# Patient Record
Sex: MALE | Race: WHITE | URBAN - METROPOLITAN AREA
[De-identification: names, ages, dates, MRNs, and addresses within clinical notes are randomized per-mention and may not be internally consistent; named-entity substitution may affect disease eponyms.]

---

## 2022-11-25 ENCOUNTER — HOSPITAL ENCOUNTER (OUTPATIENT)
Dept: LAB | Age: 62
Discharge: HOME | End: 2022-11-25

## 2022-11-25 LAB
ANION GAP SERPL CALC-SCNC: 3 MMOL/L (ref 5–15)
BUN SERPL-MCNC: 39 MG/DL (ref 6–20)
BUN/CREAT SERPL: 22 (ref 12–20)
CA-I BLD-MCNC: 8.8 MG/DL (ref 8.5–10.1)
CHLORIDE SERPL-SCNC: 114 MMOL/L (ref 97–108)
CO2 SERPL-SCNC: 27 MMOL/L (ref 21–32)
CREAT SERPL-MCNC: 1.76 MG/DL (ref 0.7–1.3)
GLUCOSE SERPL-MCNC: 109 MG/DL (ref 65–100)
POTASSIUM SERPL-SCNC: 4.3 MMOL/L (ref 3.5–5.1)
SODIUM SERPL-SCNC: 144 MMOL/L (ref 136–145)

## 2022-11-25 PROCEDURE — 80048 BASIC METABOLIC PNL TOTAL CA: CPT

## 2022-11-25 PROCEDURE — 36415 COLL VENOUS BLD VENIPUNCTURE: CPT

## 2022-12-22 ENCOUNTER — HOSPITAL ENCOUNTER (EMERGENCY)
Age: 62
Discharge: HOME | End: 2022-12-22
Attending: EMERGENCY MEDICINE
Payer: MEDICAID

## 2022-12-22 VITALS
WEIGHT: 135 LBS | RESPIRATION RATE: 16 BRPM | TEMPERATURE: 97.7 F | SYSTOLIC BLOOD PRESSURE: 159 MMHG | BODY MASS INDEX: 23.92 KG/M2 | OXYGEN SATURATION: 98 % | HEIGHT: 63 IN | DIASTOLIC BLOOD PRESSURE: 88 MMHG | HEART RATE: 68 BPM

## 2022-12-22 DIAGNOSIS — I10 ESSENTIAL HYPERTENSION: ICD-10-CM

## 2022-12-22 DIAGNOSIS — Z76.0 MEDICATION REFILL: Primary | ICD-10-CM

## 2022-12-22 PROCEDURE — 99283 EMERGENCY DEPT VISIT LOW MDM: CPT

## 2022-12-22 RX ORDER — ASPIRIN 81 MG/1
81 TABLET ORAL DAILY
Qty: 30 TABLET | Refills: 1 | Status: SHIPPED | OUTPATIENT
Start: 2022-12-22

## 2022-12-22 RX ORDER — CLOPIDOGREL BISULFATE 75 MG/1
TABLET ORAL
COMMUNITY
Start: 2022-11-25 | End: 2022-12-22 | Stop reason: SDUPTHER

## 2022-12-22 RX ORDER — AMLODIPINE BESYLATE 10 MG/1
10 TABLET ORAL DAILY
Qty: 30 TABLET | Refills: 1 | Status: SHIPPED | OUTPATIENT
Start: 2022-12-22

## 2022-12-22 RX ORDER — METOPROLOL TARTRATE 50 MG/1
50 TABLET ORAL 2 TIMES DAILY
Qty: 60 TABLET | Refills: 1 | Status: SHIPPED | OUTPATIENT
Start: 2022-12-22

## 2022-12-22 RX ORDER — ATORVASTATIN CALCIUM 40 MG/1
40 TABLET, FILM COATED ORAL
COMMUNITY
Start: 2022-05-13 | End: 2022-12-22

## 2022-12-22 RX ORDER — ATORVASTATIN CALCIUM 40 MG/1
40 TABLET, FILM COATED ORAL DAILY
Qty: 30 TABLET | Refills: 1 | Status: SHIPPED | OUTPATIENT
Start: 2022-12-22 | End: 2022-12-22

## 2022-12-22 RX ORDER — ATORVASTATIN CALCIUM 40 MG/1
40 TABLET, FILM COATED ORAL DAILY
Qty: 30 TABLET | Refills: 1 | Status: SHIPPED | OUTPATIENT
Start: 2022-12-22

## 2022-12-22 RX ORDER — ASPIRIN 81 MG/1
TABLET ORAL
COMMUNITY
Start: 2022-11-25 | End: 2022-12-22 | Stop reason: SDUPTHER

## 2022-12-22 RX ORDER — CLOPIDOGREL BISULFATE 75 MG/1
75 TABLET ORAL DAILY
Qty: 30 TABLET | Refills: 1 | Status: SHIPPED | OUTPATIENT
Start: 2022-12-22

## 2022-12-22 RX ORDER — METOPROLOL TARTRATE 50 MG/1
50 TABLET ORAL 2 TIMES DAILY
COMMUNITY
Start: 2022-11-25 | End: 2022-12-22 | Stop reason: SDUPTHER

## 2022-12-22 RX ORDER — INSULIN GLARGINE 100 [IU]/ML
10 INJECTION, SOLUTION SUBCUTANEOUS
COMMUNITY
Start: 2022-02-28

## 2022-12-22 RX ORDER — AMLODIPINE BESYLATE 10 MG/1
TABLET ORAL
COMMUNITY
Start: 2022-11-25 | End: 2022-12-22 | Stop reason: SDUPTHER

## 2022-12-22 NOTE — ED PROVIDER NOTES
EMERGENCY DEPARTMENT HISTORY AND PHYSICAL EXAM      Date: 12/22/2022  Patient Name: Carmela Patiño    History of Presenting Illness     Chief Complaint   Patient presents with    Medication Refill       History Provided By: Patient and Caregiver    HPI: Carmela Patiño, 57 y.o. male presents to the emergency department complaint of medication refill. Per caregiver, he just ran out of his medications other than insulin. Caregiver states he has a follow-up with a new primary care physician but not until next month. Patient without any complaints and caregiver states patient is at baseline. There are no other complaints, changes, or physical findings at this time. Past History     Past Medical History:  Past Medical History:   Diagnosis Date    CVS disease     Diabetes ([address]     High cholesterol     HTN (hypertension)        Past Surgical History:  History reviewed. No pertinent surgical history. Family History:  History reviewed. No pertinent family history. Social History:  Social History     Tobacco Use    Smoking status: Never    Smokeless tobacco: Never   Substance Use Topics    Alcohol use: Never    Drug use: Never       Allergies:  No Known Allergies    PCP: None    No current facility-administered medications on file prior to encounter. Current Outpatient Medications on File Prior to Encounter   Medication Sig Dispense Refill    insulin glargine (LANTUS,BASAGLAR) 100 unit/mL (3 mL) inpn 10 Units by SubCUTAneous route. [DISCONTINUED] amLODIPine (NORVASC) 10 mg tablet       [DISCONTINUED] aspirin delayed-release 81 mg tablet       [DISCONTINUED] clopidogreL (PLAVIX) 75 mg tab       [DISCONTINUED] metoprolol tartrate (LOPRESSOR) 50 mg tablet Take 50 mg by mouth two (2) times a day. [DISCONTINUED] atorvastatin (LIPITOR) 40 mg tablet Take 40 mg by mouth nightly. Review of Systems   Review of Systems  Review of Systems   Constitutional: Negative for chills and fever. HENT: Negative for sinus pressure and sinus pain. Eyes: Negative for photophobia and redness. Respiratory: Negative for shortness of breath and wheezing. Cardiovascular: Negative for chest pain and palpitations. Gastrointestinal: Negative for abdominal pain and nausea. Genitourinary: Negative for flank pain and hematuria. Musculoskeletal: Negative for arthralgias and gait problem. Skin: Negative for color change and pallor. Neurological: Negative for dizziness and weakness. Physical Exam   Physical Exam  Physical Exam  Constitutional:       General: No acute distress. Appearance: Normal appearance. Not toxic-appearing. HENT:      Head: Normocephalic and atraumatic. Nose: Nose normal.      Mouth/Throat:      Mouth: Mucous membranes are moist.   Eyes:      Extraocular Movements: Extraocular movements intact. Pupils: Pupils are equal, round, and reactive to light. Cardiovascular:      Rate and Rhythm: Normal rate. Pulses: Normal pulses. Pulmonary:      Effort: Pulmonary effort is normal.      Breath sounds: No stridor. Abdominal:      General: Abdomen is flat. There is no distension. Musculoskeletal:         General: Normal range of motion. Cervical back: Normal range of motion and neck supple. Skin:     General: Skin is warm and dry. Capillary Refill: Capillary refill takes less than 2 seconds. Neurological:      General: No focal deficit present. Mental Status: Aert and oriented to person, place, and time. Psychiatric:         Mood and Affect: Mood normal.         Behavior: Behavior normal.     Lab and Diagnostic Study Results   Labs -   No results found for this or any previous visit (from the past 12 hour(s)).     Radiologic Studies -   @lastxrresult@  CT Results  (Last 48 hours)      None          CXR Results  (Last 48 hours)      None            Medical Decision Making and ED Course   Differential Diagnosis & Medical Decision Making Provider Note:       - I am the first provider for this patient. I reviewed the vital signs, available nursing notes, past medical history, past surgical history, family history and social history. The patients presenting problems have been discussed, and they are in agreement with the care plan formulated and outlined with them. I have encouraged them to ask questions as they arise throughout their visit. Vital Signs-Reviewed the patient's vital signs. Patient Vitals for the past 12 hrs:   Temp Pulse Resp BP SpO2   12/22/22 0947 97.7 °F (36.5 °C) 68 16 (!) 159/88 98 %           Disposition   Disposition: DC- Adult Discharges: All of the diagnostic tests were reviewed and questions answered. Diagnosis, care plan and treatment options were discussed. The patient understands the instructions and will follow up as directed. The patients results have been reviewed with them. They have been counseled regarding their diagnosis. The patient verbally convey understanding and agreement of the signs, symptoms, diagnosis, treatment and prognosis and additionally agrees to follow up as recommended with their PCP in 24 - 48 hours. They also agree with the care-plan and convey that all of their questions have been answered. I have also put together some discharge instructions for them that include: 1) educational information regarding their diagnosis, 2) how to care for their diagnosis at home, as well a 3) list of reasons why they would want to return to the ED prior to their follow-up appointment, should their condition change. DISCHARGE PLAN:  1. Current Discharge Medication List        CONTINUE these medications which have CHANGED    Details   !! atorvastatin (LIPITOR) 40 mg tablet Take 1 Tablet by mouth daily. Qty: 30 Tablet, Refills: 1      metoprolol tartrate (LOPRESSOR) 50 mg tablet Take 1 Tablet by mouth two (2) times a day.   Qty: 60 Tablet, Refills: 1      clopidogreL (PLAVIX) 75 mg tab Take 1 Tablet by mouth daily. Qty: 30 Tablet, Refills: 1      amLODIPine (NORVASC) 10 mg tablet Take 1 Tablet by mouth daily. Qty: 30 Tablet, Refills: 1      aspirin delayed-release 81 mg tablet Take 1 Tablet by mouth daily. Qty: 30 Tablet, Refills: 1      !! atorvastatin (LIPITOR) 40 mg tablet Take 1 Tablet by mouth daily. Qty: 30 Tablet, Refills: 1       !! - Potential duplicate medications found. Please discuss with provider. CONTINUE these medications which have NOT CHANGED    Details   insulin glargine (LANTUS,BASAGLAR) 100 unit/mL (3 mL) inpn 10 Units by SubCUTAneous route. 2.   Follow-up Information    None       3. Return to ED if worse   4. Current Discharge Medication List        CONTINUE these medications which have CHANGED    Details   !! atorvastatin (LIPITOR) 40 mg tablet Take 1 Tablet by mouth daily. Qty: 30 Tablet, Refills: 1  Start date: 12/22/2022      metoprolol tartrate (LOPRESSOR) 50 mg tablet Take 1 Tablet by mouth two (2) times a day. Qty: 60 Tablet, Refills: 1  Start date: 12/22/2022      clopidogreL (PLAVIX) 75 mg tab Take 1 Tablet by mouth daily. Qty: 30 Tablet, Refills: 1  Start date: 12/22/2022      amLODIPine (NORVASC) 10 mg tablet Take 1 Tablet by mouth daily. Qty: 30 Tablet, Refills: 1  Start date: 12/22/2022      aspirin delayed-release 81 mg tablet Take 1 Tablet by mouth daily. Qty: 30 Tablet, Refills: 1  Start date: 12/22/2022      !! atorvastatin (LIPITOR) 40 mg tablet Take 1 Tablet by mouth daily. Qty: 30 Tablet, Refills: 1  Start date: 12/22/2022       !! - Potential duplicate medications found. Please discuss with provider. Diagnosis/Clinical Impression     Clinical Impression:   1. Medication refill    2. Essential hypertension        Attestations: IIsaiah MD, am the primary clinician of record. Please note that this dictation was completed with EPS, the [employer] voice recognition software.   Quite often unanticipated grammatical, syntax, homophones, and other interpretive errors are inadvertently transcribed by the computer software. Please disregard these errors. Please excuse any errors that have escaped final proofreading. Thank you.

## 2022-12-22 NOTE — ED TRIAGE NOTES
Ran out of medications 2 days ago, cant see Dr. Maddie Moore Jan 20, 2023  needs refil. Insulin has 20 doses left per caregiver, ran out of PO meds 2 days ago.

## 2023-08-21 ENCOUNTER — NURSE TRIAGE (OUTPATIENT)
Dept: OTHER | Facility: CLINIC | Age: 63
End: 2023-08-21

## 2023-08-21 NOTE — TELEPHONE ENCOUNTER
Location of patient: [address] [facility] [address] call from [facility] at [facility], [place]; Patient with [employer] Complaint requesting to establish care with [facility]. **limited triage as caller is not with patient**    Subjective: Caller states \"he sleeps a lot\"     Current Symptoms: increased fatigue. States he is sleeping more often. Caretaker reports she believes he takes a BP medications. Onset: a couple days ago; unchanged    Associated Symptoms: increased sleepiness    Pain Severity: 0/10; N/A; none    Temperature: no fevers     What has been tried: increased sleeping     LMP: NA Pregnant: No    Recommended disposition: See in Office Today    Care advice provided, patient verbalizes understanding; denies any other questions or concerns; instructed to call back for any new or worsening symptoms. Patient/Caller agrees with recommended disposition; writer provided warm transfer to Barbra Coppola at [facility], [place] for appointment scheduling    Attention Provider: Thank you for allowing me to participate in the care of your patient. The patient was connected to triage in response to information provided to the [facility]. Please do not respond through this encounter as the response is not directed to a shared pool.       Reason for Disposition   Taking a medicine that could cause weakness (e.g., blood pressure medications, diuretics)    Protocols used: Weakness (Generalized) and Fatigue-ADULT-OH

## 2023-10-03 ENCOUNTER — HOSPITAL ENCOUNTER (EMERGENCY)
Facility: HOSPITAL | Age: 63
Discharge: HOME | End: 2023-10-03
Payer: MEDICARE

## 2023-10-03 ENCOUNTER — OFFICE VISIT (OUTPATIENT)
Facility: CLINIC | Age: 63
End: 2023-10-03
Payer: MEDICARE

## 2023-10-03 VITALS
RESPIRATION RATE: 16 BRPM | OXYGEN SATURATION: 96 % | DIASTOLIC BLOOD PRESSURE: 70 MMHG | SYSTOLIC BLOOD PRESSURE: 125 MMHG | WEIGHT: 115 LBS | HEART RATE: 74 BPM | HEIGHT: 63 IN | BODY MASS INDEX: 20.38 KG/M2 | TEMPERATURE: 97.7 F

## 2023-10-03 VITALS
HEART RATE: 66 BPM | OXYGEN SATURATION: 97 % | HEIGHT: 63 IN | TEMPERATURE: 98 F | SYSTOLIC BLOOD PRESSURE: 149 MMHG | WEIGHT: 115 LBS | DIASTOLIC BLOOD PRESSURE: 85 MMHG | RESPIRATION RATE: 17 BRPM | BODY MASS INDEX: 20.38 KG/M2

## 2023-10-03 DIAGNOSIS — Z11.59 ENCOUNTER FOR HEPATITIS C SCREENING TEST FOR LOW RISK PATIENT: ICD-10-CM

## 2023-10-03 DIAGNOSIS — I50.32 CHRONIC DIASTOLIC CHF (CONGESTIVE HEART FAILURE) (HCC): ICD-10-CM

## 2023-10-03 DIAGNOSIS — I10 PRIMARY HYPERTENSION: ICD-10-CM

## 2023-10-03 DIAGNOSIS — E11.65 UNCONTROLLED TYPE 2 DIABETES MELLITUS WITH HYPERGLYCEMIA, WITH LONG-TERM CURRENT USE OF INSULIN (HCC): Primary | ICD-10-CM

## 2023-10-03 DIAGNOSIS — E78.2 MIXED HYPERLIPIDEMIA: ICD-10-CM

## 2023-10-03 DIAGNOSIS — Z11.4 ENCOUNTER FOR SCREENING FOR HIV: ICD-10-CM

## 2023-10-03 DIAGNOSIS — E55.9 VITAMIN D DEFICIENCY: ICD-10-CM

## 2023-10-03 DIAGNOSIS — R73.9 HYPERGLYCEMIA WITHOUT KETOSIS: Primary | ICD-10-CM

## 2023-10-03 DIAGNOSIS — Z86.73 HISTORY OF CVA (CEREBROVASCULAR ACCIDENT): ICD-10-CM

## 2023-10-03 DIAGNOSIS — Z79.4 UNCONTROLLED TYPE 2 DIABETES MELLITUS WITH HYPERGLYCEMIA, WITH LONG-TERM CURRENT USE OF INSULIN (HCC): Primary | ICD-10-CM

## 2023-10-03 PROBLEM — E78.5 HYPERLIPIDEMIA: Status: ACTIVE | Noted: 2018-02-01

## 2023-10-03 LAB
ALBUMIN SERPL-MCNC: 2.5 G/DL (ref 3.5–5)
ALBUMIN/GLOB SERPL: 0.6 (ref 1.1–2.2)
ALP SERPL-CCNC: 148 U/L (ref 45–117)
ALT SERPL-CCNC: 16 U/L (ref 12–78)
ANION GAP SERPL CALC-SCNC: 8 MMOL/L (ref 5–15)
APPEARANCE UR: CLEAR
AST SERPL W P-5'-P-CCNC: ABNORMAL U/L (ref 15–37)
BACTERIA URNS QL MICRO: NEGATIVE /HPF
BASOPHILS # BLD: 0 K/UL (ref 0–0.1)
BASOPHILS NFR BLD: 0 % (ref 0–1)
BILIRUB SERPL-MCNC: 0.4 MG/DL (ref 0.2–1)
BILIRUB UR QL: NEGATIVE
BUN SERPL-MCNC: 25 MG/DL (ref 6–20)
BUN/CREAT SERPL: 9 (ref 12–20)
CA-I BLD-MCNC: 8.6 MG/DL (ref 8.5–10.1)
CHLORIDE SERPL-SCNC: 99 MMOL/L (ref 97–108)
CO2 SERPL-SCNC: 26 MMOL/L (ref 21–32)
COLOR UR: ABNORMAL
CREAT SERPL-MCNC: 2.67 MG/DL (ref 0.7–1.3)
DIFFERENTIAL METHOD BLD: ABNORMAL
EOSINOPHIL # BLD: 0.4 K/UL (ref 0–0.4)
EOSINOPHIL NFR BLD: 5 % (ref 0–7)
EPITH CASTS URNS QL MICRO: ABNORMAL /LPF
ERYTHROCYTE [DISTWIDTH] IN BLOOD BY AUTOMATED COUNT: 13 % (ref 11.5–14.5)
GLOBULIN SER CALC-MCNC: 4 G/DL (ref 2–4)
GLUCOSE BLD STRIP.AUTO-MCNC: 436 MG/DL (ref 65–100)
GLUCOSE SERPL-MCNC: 551 MG/DL (ref 65–100)
GLUCOSE UR STRIP.AUTO-MCNC: >500 MG/DL
GLUCOSE, POC: 581 MG/DL
HBA1C MFR BLD: 15 %
HCT VFR BLD AUTO: 30.2 % (ref 36.6–50.3)
HGB BLD-MCNC: 10.6 G/DL (ref 12.1–17)
HGB UR QL STRIP: ABNORMAL
IMM GRANULOCYTES # BLD AUTO: 0 K/UL (ref 0–0.04)
IMM GRANULOCYTES NFR BLD AUTO: 0 % (ref 0–0.5)
KETONES UR QL STRIP.AUTO: NEGATIVE MG/DL
LEUKOCYTE ESTERASE UR QL STRIP.AUTO: NEGATIVE
LIPASE SERPL-CCNC: 75 U/L (ref 73–393)
LYMPHOCYTES # BLD: 0.8 K/UL (ref 0.8–3.5)
LYMPHOCYTES NFR BLD: 10 % (ref 12–49)
MCH RBC QN AUTO: 31.1 PG (ref 26–34)
MCHC RBC AUTO-ENTMCNC: 35.1 G/DL (ref 30–36.5)
MCV RBC AUTO: 88.6 FL (ref 80–99)
MONOCYTES # BLD: 0.7 K/UL (ref 0–1)
MONOCYTES NFR BLD: 9 % (ref 5–13)
MUCOUS THREADS URNS QL MICRO: ABNORMAL /LPF
NEUTS SEG # BLD: 5.7 K/UL (ref 1.8–8)
NEUTS SEG NFR BLD: 76 % (ref 32–75)
NITRITE UR QL STRIP.AUTO: NEGATIVE
NRBC # BLD: 0 K/UL (ref 0–0.01)
NRBC BLD-RTO: 0 PER 100 WBC
PERFORMED BY:: ABNORMAL
PH UR STRIP: 5 (ref 5–8)
PLATELET # BLD AUTO: 270 K/UL (ref 150–400)
PMV BLD AUTO: 10.2 FL (ref 8.9–12.9)
POTASSIUM SERPL-SCNC: ABNORMAL MMOL/L (ref 3.5–5.1)
PROT SERPL-MCNC: 6.5 G/DL (ref 6.4–8.2)
PROT UR STRIP-MCNC: 100 MG/DL
RBC # BLD AUTO: 3.41 M/UL (ref 4.1–5.7)
RBC #/AREA URNS HPF: ABNORMAL /HPF (ref 0–5)
SODIUM SERPL-SCNC: 133 MMOL/L (ref 136–145)
SP GR UR REFRACTOMETRY: 1.02 (ref 1–1.03)
URINE CULTURE IF INDICATED: ABNORMAL
UROBILINOGEN UR QL STRIP.AUTO: 0.1 EU/DL (ref 0.1–1)
WBC # BLD AUTO: 7.6 K/UL (ref 4.1–11.1)
WBC URNS QL MICRO: ABNORMAL /HPF (ref 0–4)

## 2023-10-03 PROCEDURE — 1123F ACP DISCUSS/DSCN MKR DOCD: CPT

## 2023-10-03 PROCEDURE — 82962 GLUCOSE BLOOD TEST: CPT

## 2023-10-03 PROCEDURE — 99204 OFFICE O/P NEW MOD 45 MIN: CPT

## 2023-10-03 PROCEDURE — 85025 COMPLETE CBC W/AUTO DIFF WBC: CPT

## 2023-10-03 PROCEDURE — 99284 EMERGENCY DEPT VISIT MOD MDM: CPT

## 2023-10-03 PROCEDURE — 3078F DIAST BP <80 MM HG: CPT

## 2023-10-03 PROCEDURE — 83036 HEMOGLOBIN GLYCOSYLATED A1C: CPT

## 2023-10-03 PROCEDURE — 96361 HYDRATE IV INFUSION ADD-ON: CPT

## 2023-10-03 PROCEDURE — 3074F SYST BP LT 130 MM HG: CPT

## 2023-10-03 PROCEDURE — 36415 COLL VENOUS BLD VENIPUNCTURE: CPT

## 2023-10-03 PROCEDURE — 96360 HYDRATION IV INFUSION INIT: CPT

## 2023-10-03 PROCEDURE — 81001 URINALYSIS AUTO W/SCOPE: CPT

## 2023-10-03 PROCEDURE — 80053 COMPREHEN METABOLIC PANEL: CPT

## 2023-10-03 PROCEDURE — 83690 ASSAY OF LIPASE: CPT

## 2023-10-03 PROCEDURE — 6370000000 HC RX 637 (ALT 250 FOR IP): Performed by: NURSE PRACTITIONER

## 2023-10-03 PROCEDURE — 2580000003 HC RX 258: Performed by: NURSE PRACTITIONER

## 2023-10-03 RX ORDER — CLOPIDOGREL BISULFATE 75 MG/1
75 TABLET ORAL DAILY
Qty: 90 TABLET | Refills: 0 | Status: SHIPPED | OUTPATIENT
Start: 2023-10-03

## 2023-10-03 RX ORDER — ERGOCALCIFEROL 1.25 MG/1
50000 CAPSULE ORAL WEEKLY
Qty: 12 CAPSULE | Refills: 0 | Status: SHIPPED | OUTPATIENT
Start: 2023-10-03

## 2023-10-03 RX ORDER — GLIPIZIDE 5 MG/1
1 TABLET ORAL 2 TIMES DAILY
COMMUNITY
Start: 2021-07-27 | End: 2023-10-03 | Stop reason: SDUPTHER

## 2023-10-03 RX ORDER — GLIPIZIDE 5 MG/1
5 TABLET ORAL 2 TIMES DAILY
Qty: 60 TABLET | Refills: 0 | Status: SHIPPED | OUTPATIENT
Start: 2023-10-03

## 2023-10-03 RX ORDER — NIFEDIPINE 60 MG/1
60 TABLET, EXTENDED RELEASE ORAL DAILY
COMMUNITY
Start: 2022-05-17 | End: 2023-10-03 | Stop reason: SDUPTHER

## 2023-10-03 RX ORDER — ATORVASTATIN CALCIUM 40 MG/1
40 TABLET, FILM COATED ORAL DAILY
Qty: 90 TABLET | Refills: 0 | Status: SHIPPED | OUTPATIENT
Start: 2023-10-03

## 2023-10-03 RX ORDER — AMLODIPINE BESYLATE 10 MG/1
10 TABLET ORAL DAILY
Qty: 90 TABLET | Refills: 0 | Status: SHIPPED | OUTPATIENT
Start: 2023-10-03

## 2023-10-03 RX ORDER — LOSARTAN POTASSIUM 25 MG/1
25 TABLET ORAL DAILY
Qty: 90 TABLET | Refills: 0 | Status: SHIPPED | OUTPATIENT
Start: 2023-10-03

## 2023-10-03 RX ORDER — INSULIN GLARGINE 100 [IU]/ML
15 INJECTION, SOLUTION SUBCUTANEOUS
Status: COMPLETED | OUTPATIENT
Start: 2023-10-03 | End: 2023-10-03

## 2023-10-03 RX ORDER — INSULIN GLARGINE 100 [IU]/ML
15 INJECTION, SOLUTION SUBCUTANEOUS NIGHTLY
Qty: 5 ADJUSTABLE DOSE PRE-FILLED PEN SYRINGE | Refills: 2 | Status: SHIPPED | OUTPATIENT
Start: 2023-10-03

## 2023-10-03 RX ORDER — METOPROLOL TARTRATE 50 MG/1
50 TABLET, FILM COATED ORAL 2 TIMES DAILY
Qty: 180 TABLET | Refills: 0 | Status: SHIPPED | OUTPATIENT
Start: 2023-10-03

## 2023-10-03 RX ORDER — ERGOCALCIFEROL 1.25 MG/1
CAPSULE ORAL
COMMUNITY
Start: 2023-08-21 | End: 2023-10-03 | Stop reason: SDUPTHER

## 2023-10-03 RX ORDER — 0.9 % SODIUM CHLORIDE 0.9 %
1000 INTRAVENOUS SOLUTION INTRAVENOUS
Status: COMPLETED | OUTPATIENT
Start: 2023-10-03 | End: 2023-10-03

## 2023-10-03 RX ORDER — ASPIRIN 81 MG/1
81 TABLET ORAL DAILY
Qty: 90 TABLET | Refills: 0 | Status: SHIPPED | OUTPATIENT
Start: 2023-10-03

## 2023-10-03 RX ORDER — NIFEDIPINE 60 MG/1
60 TABLET, EXTENDED RELEASE ORAL DAILY
Qty: 90 TABLET | Refills: 0 | Status: SHIPPED | OUTPATIENT
Start: 2023-10-03

## 2023-10-03 RX ORDER — GLIPIZIDE 5 MG/1
5 TABLET ORAL
Status: COMPLETED | OUTPATIENT
Start: 2023-10-03 | End: 2023-10-03

## 2023-10-03 RX ORDER — LOSARTAN POTASSIUM 25 MG/1
25 TABLET ORAL
COMMUNITY
Start: 2021-09-09 | End: 2023-10-03 | Stop reason: SDUPTHER

## 2023-10-03 RX ADMIN — INSULIN GLARGINE 15 UNITS: 100 INJECTION, SOLUTION SUBCUTANEOUS at 15:56

## 2023-10-03 RX ADMIN — SODIUM CHLORIDE 1000 ML: 9 INJECTION, SOLUTION INTRAVENOUS at 16:28

## 2023-10-03 RX ADMIN — GLIPIZIDE 5 MG: 5 TABLET ORAL at 18:00

## 2023-10-03 RX ADMIN — SODIUM CHLORIDE 1000 ML: 9 INJECTION, SOLUTION INTRAVENOUS at 14:23

## 2023-10-03 SDOH — ECONOMIC STABILITY: INCOME INSECURITY: HOW HARD IS IT FOR YOU TO PAY FOR THE VERY BASICS LIKE FOOD, HOUSING, MEDICAL CARE, AND HEATING?: NOT HARD AT ALL

## 2023-10-03 SDOH — ECONOMIC STABILITY: HOUSING INSECURITY
IN THE LAST 12 MONTHS, WAS THERE A TIME WHEN YOU DID NOT HAVE A STEADY PLACE TO SLEEP OR SLEPT IN A SHELTER (INCLUDING NOW)?: NO

## 2023-10-03 SDOH — ECONOMIC STABILITY: FOOD INSECURITY: WITHIN THE PAST 12 MONTHS, YOU WORRIED THAT YOUR FOOD WOULD RUN OUT BEFORE YOU GOT MONEY TO BUY MORE.: NEVER TRUE

## 2023-10-03 SDOH — ECONOMIC STABILITY: FOOD INSECURITY: WITHIN THE PAST 12 MONTHS, THE FOOD YOU BOUGHT JUST DIDN'T LAST AND YOU DIDN'T HAVE MONEY TO GET MORE.: NEVER TRUE

## 2023-10-03 ASSESSMENT — LIFESTYLE VARIABLES
HOW OFTEN DO YOU HAVE A DRINK CONTAINING ALCOHOL: NEVER
HOW MANY STANDARD DRINKS CONTAINING ALCOHOL DO YOU HAVE ON A TYPICAL DAY: PATIENT DOES NOT DRINK

## 2023-10-03 ASSESSMENT — ENCOUNTER SYMPTOMS
BACK PAIN: 0
NAUSEA: 0
DIARRHEA: 0
PHOTOPHOBIA: 0
EYE PAIN: 0
ABDOMINAL DISTENTION: 0
SORE THROAT: 0
VOMITING: 0
ALLERGIC/IMMUNOLOGIC NEGATIVE: 1
RHINORRHEA: 0
ABDOMINAL PAIN: 0
CHEST TIGHTNESS: 0

## 2023-10-03 ASSESSMENT — PATIENT HEALTH QUESTIONNAIRE - PHQ9
2. FEELING DOWN, DEPRESSED OR HOPELESS: 0
SUM OF ALL RESPONSES TO PHQ QUESTIONS 1-9: 0
SUM OF ALL RESPONSES TO PHQ9 QUESTIONS 1 & 2: 0
1. LITTLE INTEREST OR PLEASURE IN DOING THINGS: 0
SUM OF ALL RESPONSES TO PHQ QUESTIONS 1-9: 0

## 2023-10-03 ASSESSMENT — PAIN - FUNCTIONAL ASSESSMENT
PAIN_FUNCTIONAL_ASSESSMENT: ACTIVITIES ARE NOT PREVENTED
PAIN_FUNCTIONAL_ASSESSMENT: 0-10

## 2023-10-03 ASSESSMENT — PAIN DESCRIPTION - LOCATION: LOCATION: ABDOMEN

## 2023-10-03 ASSESSMENT — PAIN SCALES - GENERAL: PAINLEVEL_OUTOF10: 4

## 2023-10-03 NOTE — ED NOTES
Caregiver contacted about pts d/c, d/c instructions reviewed.       Jeniffer Hernández RN  10/03/23 2888

## 2023-10-03 NOTE — ED PROVIDER NOTES
software. Please disregards these errors.  Please excuse any errors that have escaped final proofreading.)     LIAN Mata NP  10/08/23 9631

## 2023-10-03 NOTE — ED TRIAGE NOTES
EMS summoned to bring pt to ED for elevated blood sugar and abd pain. Pt reports heis having \"a little abd pain at this time.  I have a high tolerance for pain\"

## 2023-10-23 RX ORDER — GLIPIZIDE 5 MG/1
5 TABLET ORAL 2 TIMES DAILY
Qty: 60 TABLET | Refills: 0 | Status: SHIPPED | OUTPATIENT
Start: 2023-10-23

## 2023-12-21 DIAGNOSIS — E55.9 VITAMIN D DEFICIENCY: ICD-10-CM

## 2023-12-27 RX ORDER — ERGOCALCIFEROL 1.25 MG/1
50000 CAPSULE ORAL WEEKLY
Qty: 4 CAPSULE | Refills: 0 | OUTPATIENT
Start: 2023-12-27

## 2023-12-29 DIAGNOSIS — E55.9 VITAMIN D DEFICIENCY: ICD-10-CM

## 2024-01-01 ENCOUNTER — HOSPITAL ENCOUNTER (INPATIENT)
Facility: HOSPITAL | Age: 64
LOS: 1 days | DRG: 605 | End: 2024-10-22
Attending: STUDENT IN AN ORGANIZED HEALTH CARE EDUCATION/TRAINING PROGRAM | Admitting: SURGERY
Payer: MEDICARE

## 2024-01-01 ENCOUNTER — APPOINTMENT (OUTPATIENT)
Facility: HOSPITAL | Age: 64
DRG: 605 | End: 2024-01-01
Payer: MEDICARE

## 2024-01-01 VITALS
BODY MASS INDEX: 13.33 KG/M2 | HEART RATE: 101 BPM | SYSTOLIC BLOOD PRESSURE: 157 MMHG | DIASTOLIC BLOOD PRESSURE: 81 MMHG | TEMPERATURE: 97.9 F | RESPIRATION RATE: 18 BRPM | HEIGHT: 68 IN | WEIGHT: 87.96 LBS | OXYGEN SATURATION: 99 %

## 2024-01-01 DIAGNOSIS — S09.90XA CLOSED HEAD INJURY, INITIAL ENCOUNTER: ICD-10-CM
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
## 2024-01-01 LAB
ALBUMIN SERPL-MCNC: 3.1 G/DL (ref 3.5–5)
ALBUMIN SERPL-MCNC: 3.4 G/DL (ref 3.5–5)
ALBUMIN/GLOB SERPL: 0.8 (ref 1.1–2.2)
ALBUMIN/GLOB SERPL: 0.9 (ref 1.1–2.2)
ALP SERPL-CCNC: 87 U/L (ref 45–117)
ALP SERPL-CCNC: 93 U/L (ref 45–117)
ALT SERPL-CCNC: 14 U/L (ref 12–78)
ALT SERPL-CCNC: 14 U/L (ref 12–78)
ANION GAP SERPL CALC-SCNC: 15 MMOL/L (ref 2–12)
ANION GAP SERPL CALC-SCNC: 16 MMOL/L (ref 2–12)
AST SERPL W P-5'-P-CCNC: 11 U/L (ref 15–37)
AST SERPL W P-5'-P-CCNC: 12 U/L (ref 15–37)
BASOPHILS # BLD: 0 K/UL (ref 0–0.1)
BASOPHILS # BLD: 0 K/UL (ref 0–0.1)
BASOPHILS NFR BLD: 0 % (ref 0–1)
BASOPHILS NFR BLD: 0 % (ref 0–1)
BILIRUB SERPL-MCNC: 0.3 MG/DL (ref 0.2–1)
BILIRUB SERPL-MCNC: 0.4 MG/DL (ref 0.2–1)
BUN SERPL-MCNC: 112 MG/DL (ref 6–20)
BUN SERPL-MCNC: 97 MG/DL (ref 6–20)
BUN/CREAT SERPL: 17 (ref 12–20)
BUN/CREAT SERPL: 18 (ref 12–20)
CA-I BLD-MCNC: 9.5 MG/DL (ref 8.5–10.1)
CA-I BLD-MCNC: 9.5 MG/DL (ref 8.5–10.1)
CHLORIDE SERPL-SCNC: 114 MMOL/L (ref 97–108)
CHLORIDE SERPL-SCNC: 116 MMOL/L (ref 97–108)
CO2 SERPL-SCNC: 14 MMOL/L (ref 21–32)
CO2 SERPL-SCNC: 16 MMOL/L (ref 21–32)
CREAT SERPL-MCNC: 5.85 MG/DL (ref 0.7–1.3)
CREAT SERPL-MCNC: 6.27 MG/DL (ref 0.7–1.3)
DIFFERENTIAL METHOD BLD: ABNORMAL
DIFFERENTIAL METHOD BLD: ABNORMAL
EOSINOPHIL # BLD: 0 K/UL (ref 0–0.4)
EOSINOPHIL # BLD: 0.4 K/UL (ref 0–0.4)
EOSINOPHIL NFR BLD: 0 % (ref 0–7)
EOSINOPHIL NFR BLD: 5 % (ref 0–7)
ERYTHROCYTE [DISTWIDTH] IN BLOOD BY AUTOMATED COUNT: 16.1 % (ref 11.5–14.5)
ERYTHROCYTE [DISTWIDTH] IN BLOOD BY AUTOMATED COUNT: 16.2 % (ref 11.5–14.5)
GLOBULIN SER CALC-MCNC: 3.9 G/DL (ref 2–4)
GLOBULIN SER CALC-MCNC: 3.9 G/DL (ref 2–4)
GLUCOSE SERPL-MCNC: 170 MG/DL (ref 65–100)
GLUCOSE SERPL-MCNC: 231 MG/DL (ref 65–100)
HCT VFR BLD AUTO: 26.9 % (ref 36.6–50.3)
HCT VFR BLD AUTO: 27.9 % (ref 36.6–50.3)
HGB BLD-MCNC: 8.7 G/DL (ref 12.1–17)
HGB BLD-MCNC: 9.1 G/DL (ref 12.1–17)
IMM GRANULOCYTES # BLD AUTO: 0 K/UL (ref 0–0.04)
IMM GRANULOCYTES # BLD AUTO: 0 K/UL (ref 0–0.04)
IMM GRANULOCYTES NFR BLD AUTO: 0 % (ref 0–0.5)
IMM GRANULOCYTES NFR BLD AUTO: 0 % (ref 0–0.5)
LYMPHOCYTES # BLD: 0.2 K/UL (ref 0.8–3.5)
LYMPHOCYTES # BLD: 0.7 K/UL (ref 0.8–3.5)
LYMPHOCYTES NFR BLD: 2 % (ref 12–49)
LYMPHOCYTES NFR BLD: 8 % (ref 12–49)
MCH RBC QN AUTO: 31 PG (ref 26–34)
MCH RBC QN AUTO: 31.3 PG (ref 26–34)
MCHC RBC AUTO-ENTMCNC: 32.3 G/DL (ref 30–36.5)
MCHC RBC AUTO-ENTMCNC: 32.6 G/DL (ref 30–36.5)
MCV RBC AUTO: 94.9 FL (ref 80–99)
MCV RBC AUTO: 96.8 FL (ref 80–99)
MONOCYTES # BLD: 0.3 K/UL (ref 0–1)
MONOCYTES # BLD: 0.3 K/UL (ref 0–1)
MONOCYTES NFR BLD: 2 % (ref 5–13)
MONOCYTES NFR BLD: 4 % (ref 5–13)
NEUTS SEG # BLD: 13.6 K/UL (ref 1.8–8)
NEUTS SEG # BLD: 6.7 K/UL (ref 1.8–8)
NEUTS SEG NFR BLD: 83 % (ref 32–75)
NEUTS SEG NFR BLD: 96 % (ref 32–75)
NRBC # BLD: 0 K/UL (ref 0–0.01)
NRBC # BLD: 0 K/UL (ref 0–0.01)
NRBC BLD-RTO: 0 PER 100 WBC
NRBC BLD-RTO: 0 PER 100 WBC
PLATELET # BLD AUTO: 247 K/UL (ref 150–400)
PLATELET # BLD AUTO: 265 K/UL (ref 150–400)
PMV BLD AUTO: 9.7 FL (ref 8.9–12.9)
PMV BLD AUTO: 9.8 FL (ref 8.9–12.9)
POTASSIUM SERPL-SCNC: 5.3 MMOL/L (ref 3.5–5.1)
POTASSIUM SERPL-SCNC: 5.9 MMOL/L (ref 3.5–5.1)
PROT SERPL-MCNC: 7 G/DL (ref 6.4–8.2)
PROT SERPL-MCNC: 7.3 G/DL (ref 6.4–8.2)
RBC # BLD AUTO: 2.78 M/UL (ref 4.1–5.7)
RBC # BLD AUTO: 2.94 M/UL (ref 4.1–5.7)
SARS-COV-2 RNA RESP QL NAA+PROBE: NOT DETECTED
SODIUM SERPL-SCNC: 145 MMOL/L (ref 136–145)
SODIUM SERPL-SCNC: 146 MMOL/L (ref 136–145)
SPECIMEN SOURCE: NORMAL
TSH SERPL DL<=0.05 MIU/L-ACNC: 3.91 UIU/ML (ref 0.36–3.74)
WBC # BLD AUTO: 14.2 K/UL (ref 4.1–11.1)
WBC # BLD AUTO: 8.1 K/UL (ref 4.1–11.1)

## 2024-01-01 PROCEDURE — 36415 COLL VENOUS BLD VENIPUNCTURE: CPT

## 2024-01-01 PROCEDURE — 71045 X-RAY EXAM CHEST 1 VIEW: CPT

## 2024-01-01 PROCEDURE — 99222 1ST HOSP IP/OBS MODERATE 55: CPT | Performed by: SURGERY

## 2024-01-01 PROCEDURE — 1100000000 HC RM PRIVATE

## 2024-01-01 PROCEDURE — 72125 CT NECK SPINE W/O DYE: CPT

## 2024-01-01 PROCEDURE — 87635 SARS-COV-2 COVID-19 AMP PRB: CPT

## 2024-01-01 PROCEDURE — 6360000002 HC RX W HCPCS: Performed by: STUDENT IN AN ORGANIZED HEALTH CARE EDUCATION/TRAINING PROGRAM

## 2024-01-01 PROCEDURE — 6830039000 HC L3 TRAUMA ALERT

## 2024-01-01 PROCEDURE — 85025 COMPLETE CBC W/AUTO DIFF WBC: CPT

## 2024-01-01 PROCEDURE — 2580000003 HC RX 258: Performed by: SURGERY

## 2024-01-01 PROCEDURE — 6370000000 HC RX 637 (ALT 250 FOR IP): Performed by: SURGERY

## 2024-01-01 PROCEDURE — 6360000002 HC RX W HCPCS: Performed by: SURGERY

## 2024-01-01 PROCEDURE — 80053 COMPREHEN METABOLIC PANEL: CPT

## 2024-01-01 PROCEDURE — 3E033XZ INTRODUCTION OF VASOPRESSOR INTO PERIPHERAL VEIN, PERCUTANEOUS APPROACH: ICD-10-PCS | Performed by: INTERNAL MEDICINE

## 2024-01-01 PROCEDURE — 2580000003 HC RX 258: Performed by: STUDENT IN AN ORGANIZED HEALTH CARE EDUCATION/TRAINING PROGRAM

## 2024-01-01 PROCEDURE — 84443 ASSAY THYROID STIM HORMONE: CPT

## 2024-01-01 PROCEDURE — 0BH17EZ INSERTION OF ENDOTRACHEAL AIRWAY INTO TRACHEA, VIA NATURAL OR ARTIFICIAL OPENING: ICD-10-PCS | Performed by: INTERNAL MEDICINE

## 2024-01-01 PROCEDURE — 99285 EMERGENCY DEPT VISIT HI MDM: CPT

## 2024-01-01 PROCEDURE — 5A12012 PERFORMANCE OF CARDIAC OUTPUT, SINGLE, MANUAL: ICD-10-PCS | Performed by: INTERNAL MEDICINE

## 2024-01-01 PROCEDURE — 70450 CT HEAD/BRAIN W/O DYE: CPT

## 2024-01-01 PROCEDURE — 96374 THER/PROPH/DIAG INJ IV PUSH: CPT

## 2024-01-01 PROCEDURE — 96360 HYDRATION IV INFUSION INIT: CPT

## 2024-01-01 RX ORDER — ACETAMINOPHEN 325 MG/1
650 TABLET ORAL EVERY 6 HOURS PRN
Status: DISCONTINUED | OUTPATIENT
Start: 2024-01-01 | End: 2024-01-01 | Stop reason: HOSPADM

## 2024-01-01 RX ORDER — ATORVASTATIN CALCIUM 40 MG/1
40 TABLET, FILM COATED ORAL DAILY
Status: DISCONTINUED | OUTPATIENT
Start: 2024-01-01 | End: 2024-01-01 | Stop reason: HOSPADM

## 2024-01-01 RX ORDER — MIRTAZAPINE 15 MG/1
7.5 TABLET, ORALLY DISINTEGRATING ORAL NIGHTLY
Status: DISCONTINUED | OUTPATIENT
Start: 2024-01-01 | End: 2024-01-01 | Stop reason: HOSPADM

## 2024-01-01 RX ORDER — METOPROLOL TARTRATE 25 MG/1
12.5 TABLET, FILM COATED ORAL 2 TIMES DAILY
Status: DISCONTINUED | OUTPATIENT
Start: 2024-01-01 | End: 2024-01-01 | Stop reason: HOSPADM

## 2024-01-01 RX ORDER — AMLODIPINE BESYLATE 5 MG/1
10 TABLET ORAL DAILY
Status: DISCONTINUED | OUTPATIENT
Start: 2024-01-01 | End: 2024-01-01 | Stop reason: HOSPADM

## 2024-01-01 RX ORDER — ONDANSETRON 2 MG/ML
4 INJECTION INTRAMUSCULAR; INTRAVENOUS ONCE
Status: COMPLETED | OUTPATIENT
Start: 2024-01-01 | End: 2024-01-01

## 2024-01-01 RX ORDER — DEXTROSE MONOHYDRATE 100 MG/ML
INJECTION, SOLUTION INTRAVENOUS CONTINUOUS PRN
Status: DISCONTINUED | OUTPATIENT
Start: 2024-01-01 | End: 2024-01-01 | Stop reason: HOSPADM

## 2024-01-01 RX ORDER — FERROUS SULFATE 325(65) MG
325 TABLET ORAL
Status: DISCONTINUED | OUTPATIENT
Start: 2024-10-23 | End: 2024-01-01 | Stop reason: HOSPADM

## 2024-01-01 RX ORDER — GLUCAGON 1 MG/ML
1 KIT INJECTION PRN
Status: DISCONTINUED | OUTPATIENT
Start: 2024-01-01 | End: 2024-01-01 | Stop reason: HOSPADM

## 2024-01-01 RX ORDER — ASPIRIN 81 MG/1
81 TABLET ORAL DAILY
Status: DISCONTINUED | OUTPATIENT
Start: 2024-01-01 | End: 2024-01-01 | Stop reason: HOSPADM

## 2024-01-01 RX ORDER — ONDANSETRON 4 MG/1
4 TABLET, ORALLY DISINTEGRATING ORAL EVERY 8 HOURS PRN
Status: DISCONTINUED | OUTPATIENT
Start: 2024-01-01 | End: 2024-01-01 | Stop reason: HOSPADM

## 2024-01-01 RX ORDER — TAMSULOSIN HYDROCHLORIDE 0.4 MG/1
0.4 CAPSULE ORAL DAILY
Status: DISCONTINUED | OUTPATIENT
Start: 2024-01-01 | End: 2024-01-01 | Stop reason: HOSPADM

## 2024-01-01 RX ORDER — ACETAMINOPHEN 650 MG/1
650 SUPPOSITORY RECTAL EVERY 6 HOURS PRN
Status: DISCONTINUED | OUTPATIENT
Start: 2024-01-01 | End: 2024-01-01 | Stop reason: HOSPADM

## 2024-01-01 RX ORDER — SODIUM CHLORIDE 0.9 % (FLUSH) 0.9 %
5-40 SYRINGE (ML) INJECTION EVERY 12 HOURS SCHEDULED
Status: DISCONTINUED | OUTPATIENT
Start: 2024-01-01 | End: 2024-01-01 | Stop reason: HOSPADM

## 2024-01-01 RX ORDER — CALCIUM CARBONATE 500 MG/1
1000 TABLET, CHEWABLE ORAL DAILY
Status: DISCONTINUED | OUTPATIENT
Start: 2024-01-01 | End: 2024-01-01 | Stop reason: HOSPADM

## 2024-01-01 RX ORDER — POLYETHYLENE GLYCOL 3350 17 G/17G
17 POWDER, FOR SOLUTION ORAL DAILY PRN
Status: DISCONTINUED | OUTPATIENT
Start: 2024-01-01 | End: 2024-01-01 | Stop reason: HOSPADM

## 2024-01-01 RX ORDER — LOPERAMIDE HYDROCHLORIDE 2 MG/1
2 CAPSULE ORAL 4 TIMES DAILY PRN
Status: DISCONTINUED | OUTPATIENT
Start: 2024-01-01 | End: 2024-01-01 | Stop reason: HOSPADM

## 2024-01-01 RX ORDER — SODIUM CHLORIDE 9 MG/ML
INJECTION, SOLUTION INTRAVENOUS CONTINUOUS
Status: DISCONTINUED | OUTPATIENT
Start: 2024-01-01 | End: 2024-01-01

## 2024-01-01 RX ORDER — ERGOCALCIFEROL 1.25 MG/1
50000 CAPSULE, LIQUID FILLED ORAL WEEKLY
Status: DISCONTINUED | OUTPATIENT
Start: 2024-01-01 | End: 2024-01-01 | Stop reason: HOSPADM

## 2024-01-01 RX ORDER — BUPROPION HYDROCHLORIDE 150 MG/1
150 TABLET ORAL EVERY MORNING
Status: DISCONTINUED | OUTPATIENT
Start: 2024-01-01 | End: 2024-01-01 | Stop reason: HOSPADM

## 2024-01-01 RX ORDER — SODIUM CHLORIDE 9 MG/ML
INJECTION, SOLUTION INTRAVENOUS PRN
Status: DISCONTINUED | OUTPATIENT
Start: 2024-01-01 | End: 2024-01-01

## 2024-01-01 RX ORDER — INSULIN LISPRO 100 [IU]/ML
0-4 INJECTION, SOLUTION INTRAVENOUS; SUBCUTANEOUS
Status: DISCONTINUED | OUTPATIENT
Start: 2024-01-01 | End: 2024-01-01 | Stop reason: HOSPADM

## 2024-01-01 RX ORDER — HEPARIN SODIUM 5000 [USP'U]/ML
5000 INJECTION, SOLUTION INTRAVENOUS; SUBCUTANEOUS EVERY 8 HOURS SCHEDULED
Status: DISCONTINUED | OUTPATIENT
Start: 2024-01-01 | End: 2024-01-01 | Stop reason: HOSPADM

## 2024-01-01 RX ORDER — SODIUM CHLORIDE 0.9 % (FLUSH) 0.9 %
5-40 SYRINGE (ML) INJECTION PRN
Status: DISCONTINUED | OUTPATIENT
Start: 2024-01-01 | End: 2024-01-01 | Stop reason: HOSPADM

## 2024-01-01 RX ORDER — LANOLIN ALCOHOL/MO/W.PET/CERES
3 CREAM (GRAM) TOPICAL DAILY
Status: DISCONTINUED | OUTPATIENT
Start: 2024-01-01 | End: 2024-01-01 | Stop reason: HOSPADM

## 2024-01-01 RX ORDER — DONEPEZIL HYDROCHLORIDE 5 MG/1
10 TABLET, FILM COATED ORAL NIGHTLY
Status: DISCONTINUED | OUTPATIENT
Start: 2024-01-01 | End: 2024-01-01 | Stop reason: HOSPADM

## 2024-01-01 RX ORDER — HYDRALAZINE HYDROCHLORIDE 25 MG/1
25 TABLET, FILM COATED ORAL 3 TIMES DAILY
Status: DISCONTINUED | OUTPATIENT
Start: 2024-01-01 | End: 2024-01-01 | Stop reason: HOSPADM

## 2024-01-01 RX ORDER — TRAZODONE HYDROCHLORIDE 50 MG/1
50 TABLET, FILM COATED ORAL NIGHTLY
Status: DISCONTINUED | OUTPATIENT
Start: 2024-01-01 | End: 2024-01-01 | Stop reason: HOSPADM

## 2024-01-01 RX ORDER — ONDANSETRON 2 MG/ML
4 INJECTION INTRAMUSCULAR; INTRAVENOUS EVERY 6 HOURS PRN
Status: DISCONTINUED | OUTPATIENT
Start: 2024-01-01 | End: 2024-01-01 | Stop reason: HOSPADM

## 2024-01-01 RX ORDER — ERGOCALCIFEROL 1.25 MG/1
50000 CAPSULE ORAL WEEKLY
Qty: 4 CAPSULE | Refills: 0 | OUTPATIENT
Start: 2024-01-01

## 2024-01-01 RX ORDER — 0.9 % SODIUM CHLORIDE 0.9 %
500 INTRAVENOUS SOLUTION INTRAVENOUS ONCE
Status: COMPLETED | OUTPATIENT
Start: 2024-01-01 | End: 2024-01-01

## 2024-01-01 RX ORDER — SODIUM CHLORIDE 9 MG/ML
50 INJECTION, SOLUTION INTRAVENOUS
Status: DISCONTINUED | OUTPATIENT
Start: 2024-01-01 | End: 2024-01-01 | Stop reason: HOSPADM

## 2024-01-01 RX ORDER — INSULIN GLARGINE 100 [IU]/ML
15 INJECTION, SOLUTION SUBCUTANEOUS NIGHTLY
Status: DISCONTINUED | OUTPATIENT
Start: 2024-01-01 | End: 2024-01-01 | Stop reason: HOSPADM

## 2024-01-01 RX ORDER — ONDANSETRON 4 MG/1
4 TABLET, ORALLY DISINTEGRATING ORAL EVERY 6 HOURS PRN
Status: DISCONTINUED | OUTPATIENT
Start: 2024-01-01 | End: 2024-01-01 | Stop reason: HOSPADM

## 2024-01-01 RX ORDER — LEVOTHYROXINE SODIUM 25 UG/1
25 TABLET ORAL DAILY
Status: DISCONTINUED | OUTPATIENT
Start: 2024-01-01 | End: 2024-01-01 | Stop reason: HOSPADM

## 2024-01-01 RX ADMIN — LEVOTHYROXINE SODIUM 25 MCG: 0.03 TABLET ORAL at 14:42

## 2024-01-01 RX ADMIN — HEPARIN SODIUM 5000 UNITS: 5000 INJECTION INTRAVENOUS; SUBCUTANEOUS at 14:41

## 2024-01-01 RX ADMIN — TAMSULOSIN HYDROCHLORIDE 0.4 MG: 0.4 CAPSULE ORAL at 14:44

## 2024-01-01 RX ADMIN — SODIUM CHLORIDE, PRESERVATIVE FREE 10 ML: 5 INJECTION INTRAVENOUS at 09:50

## 2024-01-01 RX ADMIN — CALCIUM CARBONATE 1000 MG: 500 TABLET, CHEWABLE ORAL at 14:42

## 2024-01-01 RX ADMIN — ONDANSETRON 4 MG: 2 INJECTION INTRAMUSCULAR; INTRAVENOUS at 23:38

## 2024-01-01 RX ADMIN — METOPROLOL TARTRATE 12.5 MG: 25 TABLET, FILM COATED ORAL at 14:42

## 2024-01-01 RX ADMIN — ATORVASTATIN CALCIUM 40 MG: 40 TABLET, FILM COATED ORAL at 14:42

## 2024-01-01 RX ADMIN — SERTRALINE HYDROCHLORIDE 150 MG: 50 TABLET ORAL at 14:42

## 2024-01-01 RX ADMIN — ONDANSETRON 4 MG: 2 INJECTION INTRAMUSCULAR; INTRAVENOUS at 16:37

## 2024-01-01 RX ADMIN — SODIUM CHLORIDE, PRESERVATIVE FREE 10 ML: 5 INJECTION INTRAVENOUS at 23:41

## 2024-01-01 RX ADMIN — ONDANSETRON 4 MG: 2 INJECTION INTRAMUSCULAR; INTRAVENOUS at 06:47

## 2024-01-01 RX ADMIN — ASPIRIN 81 MG: 81 TABLET, COATED ORAL at 14:41

## 2024-01-01 RX ADMIN — HEPARIN SODIUM 5000 UNITS: 5000 INJECTION INTRAVENOUS; SUBCUTANEOUS at 06:47

## 2024-01-01 RX ADMIN — ERGOCALCIFEROL 50000 UNITS: 1.25 CAPSULE ORAL at 14:45

## 2024-01-01 RX ADMIN — BUPROPION HYDROCHLORIDE 150 MG: 150 TABLET, EXTENDED RELEASE ORAL at 14:44

## 2024-01-01 RX ADMIN — SODIUM CHLORIDE 500 ML: 9 INJECTION, SOLUTION INTRAVENOUS at 18:12

## 2024-01-01 RX ADMIN — HYDRALAZINE HYDROCHLORIDE 25 MG: 25 TABLET ORAL at 14:41

## 2024-01-01 RX ADMIN — SODIUM CHLORIDE: 9 INJECTION, SOLUTION INTRAVENOUS at 23:43

## 2024-01-01 RX ADMIN — AMLODIPINE BESYLATE 10 MG: 5 TABLET ORAL at 14:42

## 2024-01-01 ASSESSMENT — PAIN SCALES - GENERAL: PAINLEVEL_OUTOF10: 0

## 2024-01-01 ASSESSMENT — PAIN SCALES - WONG BAKER: WONGBAKER_NUMERICALRESPONSE: HURTS WHOLE LOT

## 2024-01-23 DIAGNOSIS — Z86.73 HISTORY OF CVA (CEREBROVASCULAR ACCIDENT): ICD-10-CM

## 2024-01-23 RX ORDER — ASPIRIN 81 MG/1
81 TABLET, COATED ORAL DAILY
Qty: 30 TABLET | Refills: 0 | Status: SHIPPED | OUTPATIENT
Start: 2024-01-23

## 2024-02-19 DIAGNOSIS — Z86.73 HISTORY OF CVA (CEREBROVASCULAR ACCIDENT): ICD-10-CM

## 2024-02-19 RX ORDER — ASPIRIN 81 MG/1
81 TABLET, COATED ORAL DAILY
Qty: 30 TABLET | Refills: 0 | OUTPATIENT
Start: 2024-02-19

## 2024-02-26 DIAGNOSIS — Z86.73 HISTORY OF CVA (CEREBROVASCULAR ACCIDENT): ICD-10-CM

## 2024-02-26 RX ORDER — ASPIRIN 81 MG/1
81 TABLET, COATED ORAL DAILY
Qty: 30 TABLET | Refills: 0 | OUTPATIENT
Start: 2024-02-26

## 2024-02-27 DIAGNOSIS — Z86.73 HISTORY OF CVA (CEREBROVASCULAR ACCIDENT): ICD-10-CM

## 2024-02-27 RX ORDER — ASPIRIN 81 MG/1
81 TABLET, COATED ORAL DAILY
Qty: 30 TABLET | Refills: 0 | OUTPATIENT
Start: 2024-02-27

## 2024-03-11 ENCOUNTER — HOSPITAL ENCOUNTER (EMERGENCY)
Facility: HOSPITAL | Age: 64
Discharge: HOME | End: 2024-03-11
Attending: EMERGENCY MEDICINE
Payer: MEDICARE

## 2024-03-11 VITALS
WEIGHT: 315 LBS | DIASTOLIC BLOOD PRESSURE: 66 MMHG | TEMPERATURE: 97.7 F | HEART RATE: 54 BPM | RESPIRATION RATE: 14 BRPM | BODY MASS INDEX: 55.81 KG/M2 | OXYGEN SATURATION: 98 % | SYSTOLIC BLOOD PRESSURE: 115 MMHG | HEIGHT: 63 IN

## 2024-03-11 DIAGNOSIS — N18.9 CHRONIC KIDNEY DISEASE, UNSPECIFIED CKD STAGE: Primary | ICD-10-CM

## 2024-03-11 LAB
ANION GAP BLD CALC-SCNC: 10
ANION GAP BLD CALC-SCNC: 6
BASOPHILS # BLD: 0 K/UL (ref 0–0.1)
BASOPHILS NFR BLD: 0 % (ref 0–1)
CA-I BLD-MCNC: 1.2 MMOL/L (ref 1.12–1.32)
CA-I BLD-MCNC: 1.26 MMOL/L (ref 1.12–1.32)
CHLORIDE BLD-SCNC: 113 MMOL/L (ref 98–107)
CHLORIDE BLD-SCNC: 117 MMOL/L (ref 98–107)
CO2 BLD-SCNC: 20 MMOL/L
CO2 BLD-SCNC: 20 MMOL/L
CREAT UR-MCNC: 2.49 MG/DL (ref 0.6–1.3)
CREAT UR-MCNC: 2.82 MG/DL (ref 0.6–1.3)
DIFFERENTIAL METHOD BLD: ABNORMAL
EOSINOPHIL # BLD: 0.9 K/UL (ref 0–0.4)
EOSINOPHIL NFR BLD: 9 % (ref 0–7)
ERYTHROCYTE [DISTWIDTH] IN BLOOD BY AUTOMATED COUNT: 13.5 % (ref 11.5–14.5)
GLUCOSE BLD STRIP.AUTO-MCNC: 211 MG/DL (ref 65–100)
GLUCOSE BLD STRIP.AUTO-MCNC: 252 MG/DL (ref 65–100)
HCT VFR BLD AUTO: 31.3 % (ref 36.6–50.3)
HGB BLD-MCNC: 10.3 G/DL (ref 12.1–17)
IMM GRANULOCYTES # BLD AUTO: 0 K/UL (ref 0–0.04)
IMM GRANULOCYTES NFR BLD AUTO: 0 % (ref 0–0.5)
LACTATE BLD-SCNC: 1.35 MMOL/L (ref 0.4–2)
LACTATE BLD-SCNC: 2.15 MMOL/L (ref 0.4–2)
LYMPHOCYTES # BLD: 1 K/UL (ref 0.8–3.5)
LYMPHOCYTES NFR BLD: 9 % (ref 12–49)
MCH RBC QN AUTO: 32.1 PG (ref 26–34)
MCHC RBC AUTO-ENTMCNC: 32.9 G/DL (ref 30–36.5)
MCV RBC AUTO: 97.5 FL (ref 80–99)
MONOCYTES # BLD: 0.5 K/UL (ref 0–1)
MONOCYTES NFR BLD: 5 % (ref 5–13)
NEUTS SEG # BLD: 8.1 K/UL (ref 1.8–8)
NEUTS SEG NFR BLD: 77 % (ref 32–75)
NRBC # BLD: 0 K/UL (ref 0–0.01)
NRBC BLD-RTO: 0 PER 100 WBC
PERFORMED BY:: ABNORMAL
PERFORMED BY:: ABNORMAL
PLATELET # BLD AUTO: 335 K/UL (ref 150–400)
PMV BLD AUTO: 9.6 FL (ref 8.9–12.9)
POTASSIUM BLD-SCNC: 5.3 MMOL/L (ref 3.5–5.5)
POTASSIUM BLD-SCNC: 5.7 MMOL/L (ref 3.5–5.5)
RBC # BLD AUTO: 3.21 M/UL (ref 4.1–5.7)
SERVICE CMNT-IMP: ABNORMAL
SODIUM BLD-SCNC: 142 MMOL/L (ref 136–145)
SODIUM BLD-SCNC: 142 MMOL/L (ref 136–145)
SPECIMEN SITE: ABNORMAL
SPECIMEN SITE: ABNORMAL
WBC # BLD AUTO: 10.6 K/UL (ref 4.1–11.1)

## 2024-03-11 PROCEDURE — 36415 COLL VENOUS BLD VENIPUNCTURE: CPT

## 2024-03-11 PROCEDURE — 2580000003 HC RX 258: Performed by: EMERGENCY MEDICINE

## 2024-03-11 PROCEDURE — 80047 BASIC METABLC PNL IONIZED CA: CPT

## 2024-03-11 PROCEDURE — 83605 ASSAY OF LACTIC ACID: CPT

## 2024-03-11 PROCEDURE — 99284 EMERGENCY DEPT VISIT MOD MDM: CPT

## 2024-03-11 PROCEDURE — 85025 COMPLETE CBC W/AUTO DIFF WBC: CPT

## 2024-03-11 PROCEDURE — 96360 HYDRATION IV INFUSION INIT: CPT

## 2024-03-11 RX ORDER — DONEPEZIL HYDROCHLORIDE 5 MG/1
5 TABLET, FILM COATED ORAL EVERY MORNING
COMMUNITY

## 2024-03-11 RX ORDER — 0.9 % SODIUM CHLORIDE 0.9 %
1000 INTRAVENOUS SOLUTION INTRAVENOUS ONCE
Status: COMPLETED | OUTPATIENT
Start: 2024-03-11 | End: 2024-03-11

## 2024-03-11 RX ADMIN — SODIUM CHLORIDE 1000 ML: 9 INJECTION, SOLUTION INTRAVENOUS at 10:13

## 2024-03-11 ASSESSMENT — PAIN SCALES - GENERAL
PAINLEVEL_OUTOF10: 0

## 2024-03-11 ASSESSMENT — PAIN - FUNCTIONAL ASSESSMENT
PAIN_FUNCTIONAL_ASSESSMENT: 0-10
PAIN_FUNCTIONAL_ASSESSMENT: 0-10

## 2024-03-11 NOTE — ED NOTES
Karen Samano [phone] was contacted for pt to receive transportation back to [facility] and [place]. Pt ambulated out with all belongings and a steady gait to the waiting room.

## 2024-03-11 NOTE — ED PROVIDER NOTES
Determinants affecting Diagnosis/Treatment: None    Smoking Cessation: Not Applicable    PROCEDURES   Unless otherwise noted above, none  Procedures      CRITICAL CARE TIME   Patient does not meet Critical Care Time, 0 minutes    ED IMPRESSION     1. Chronic kidney disease, unspecified CKD stage          DISPOSITION/PLAN   DISPOSITION Decision To Discharge 03/11/2024 11:27:28 AM    Discharge Note: The patient is stable for discharge home. The signs, symptoms, diagnosis, and discharge instructions have been discussed, understanding conveyed, and agreed upon. The patient is to follow up as recommended or return to ER should their symptoms worsen.      PATIENT REFERRED TO:  Fatmata Monroy MD  [address] [place], [place] [id number] [phone]    Schedule an appointment as soon as possible for a visit           DISCHARGE MEDICATIONS:     Medication List        ASK your doctor about these medications      amLODIPine 10 MG tablet  Commonly known as: NORVASC  Take 1 tablet by mouth daily     Aspirin Low Dose 81 MG EC tablet  Generic drug: aspirin  TAKE ONE TABLET BY MOUTH DAILY.     atorvastatin 40 MG tablet  Commonly known as: LIPITOR  Take 1 tablet by mouth daily     clopidogrel 75 MG tablet  Commonly known as: PLAVIX  Take 1 tablet by mouth daily     donepezil 5 MG tablet  Commonly known as: ARICEPT     glipiZIDE 5 MG tablet  Commonly known as: GLUCOTROL  TAKE 1 TABLET BY MOUTH TWICE DAILY.     Lantus SoloStar 100 UNIT/ML injection pen  Generic drug: insulin glargine  Inject 15 Units into the skin nightly     losartan 25 MG tablet  Commonly known as: COZAAR  Take 1 tablet by mouth daily     metoprolol tartrate 50 MG tablet  Commonly known as: LOPRESSOR  Take 1 tablet by mouth 2 times daily     NIFEdipine 60 MG extended release tablet  Commonly known as: PROCARDIA XL  Take 1 tablet by mouth daily     vitamin D 1.25 MG (12155 UT) Caps capsule  Commonly known as: ERGOCALCIFEROL  Take 1 capsule by mouth once a week

## 2024-03-11 NOTE — DISCHARGE INSTRUCTIONS
Thank you!  Thank you for allowing me to care for you in the emergency department. It is my goal to provide you with excellent care.  Please fill out the survey that will come to you by mail or email since we listen to your feedback!     Below you will find a list of your tests from today's visit.  Should you have any questions, please do not hesitate to call the emergency department.    Labs  Recent Results (from the past 12 hour(s))   CBC with Auto Differential    Collection Time: 03/11/24  9:41 AM   Result Value Ref Range    WBC 10.6 4.1 - 11.1 K/uL    RBC 3.21 (L) 4.10 - 5.70 M/uL    Hemoglobin 10.3 (L) 12.1 - 17.0 g/dL    Hematocrit 31.3 (L) 36.6 - 50.3 %    MCV 97.5 80.0 - 99.0 FL    MCH 32.1 26.0 - 34.0 PG    MCHC 32.9 30.0 - 36.5 g/dL    RDW 13.5 11.5 - 14.5 %    Platelets 335 150 - 400 K/uL    MPV 9.6 8.9 - 12.9 FL    Nucleated RBCs 0.0 0.0  WBC    nRBC 0.00 0.00 - 0.01 K/uL    Neutrophils % 77 (H) 32 - 75 %    Lymphocytes % 9 (L) 12 - 49 %    Monocytes % 5 5 - 13 %    Eosinophils % 9 (H) 0 - 7 %    Basophils % 0 0 - 1 %    Immature Granulocytes 0 0 - 0.5 %    Neutrophils Absolute 8.1 (H) 1.8 - 8.0 K/UL    Lymphocytes Absolute 1.0 0.8 - 3.5 K/UL    Monocytes Absolute 0.5 0.0 - 1.0 K/UL    Eosinophils Absolute 0.9 (H) 0.0 - 0.4 K/UL    Basophils Absolute 0.0 0.0 - 0.1 K/UL    Absolute Immature Granulocyte 0.0 0.00 - 0.04 K/UL    Differential Type AUTOMATED     POC CHEMISTRY (NA,K,ICA,GLU,CALC HCT/HGB,LACTATE,CREA,CL)    Collection Time: 03/11/24 10:00 AM   Result Value Ref Range    POC Sodium 142 136 - 145 mmol/L    POC Potassium 5.3 3.5 - 5.5 mmol/L    POC Ionized Calcium 1.26 1.12 - 1.32 mmol/L    POC Chloride 113 (H) 98 - 107 MMOL/L    POC Creatinine 2.82 (H) 0.6 - 1.3 MG/DL    Anion Gap, POC 10      POC Glucose 252 (H) 65 - 100 mg/dL    eGFR, POC 24 (L) >60 ml/min/1.73m2    POC TCO2 20 MMOL/L    POC Lactic Acid 2.15 (HH) 0.40 - 2.00 mmol/L    Source Venous      Performed by: Jamie García

## 2024-03-19 DIAGNOSIS — Z86.73 HISTORY OF CVA (CEREBROVASCULAR ACCIDENT): ICD-10-CM

## 2024-03-19 RX ORDER — ASPIRIN 81 MG/1
81 TABLET, COATED ORAL DAILY
Qty: 30 TABLET | Refills: 0 | OUTPATIENT
Start: 2024-03-19

## 2024-03-21 DIAGNOSIS — Z86.73 HISTORY OF CVA (CEREBROVASCULAR ACCIDENT): ICD-10-CM

## 2024-03-21 RX ORDER — ASPIRIN 81 MG/1
81 TABLET, COATED ORAL DAILY
Qty: 30 TABLET | Refills: 0 | OUTPATIENT
Start: 2024-03-21

## 2024-03-27 DIAGNOSIS — Z86.73 HISTORY OF CVA (CEREBROVASCULAR ACCIDENT): ICD-10-CM

## 2024-03-27 RX ORDER — ASPIRIN 81 MG/1
81 TABLET, COATED ORAL DAILY
Qty: 30 TABLET | Refills: 0 | OUTPATIENT
Start: 2024-03-27

## 2024-05-12 ENCOUNTER — APPOINTMENT (OUTPATIENT)
Facility: HOSPITAL | Age: 64
End: 2024-05-12
Payer: MEDICARE

## 2024-05-12 ENCOUNTER — HOSPITAL ENCOUNTER (EMERGENCY)
Facility: HOSPITAL | Age: 64
Discharge: HOME | End: 2024-05-12
Attending: EMERGENCY MEDICINE
Payer: MEDICARE

## 2024-05-12 VITALS
WEIGHT: 135 LBS | RESPIRATION RATE: 15 BRPM | HEIGHT: 63 IN | DIASTOLIC BLOOD PRESSURE: 73 MMHG | TEMPERATURE: 98.4 F | OXYGEN SATURATION: 97 % | HEART RATE: 93 BPM | SYSTOLIC BLOOD PRESSURE: 122 MMHG | BODY MASS INDEX: 23.92 KG/M2

## 2024-05-12 DIAGNOSIS — S09.90XA INJURY OF HEAD, INITIAL ENCOUNTER: Primary | ICD-10-CM

## 2024-05-12 DIAGNOSIS — S01.01XA LACERATION OF SCALP, INITIAL ENCOUNTER: ICD-10-CM
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
## 2024-05-12 PROCEDURE — 6360000002 HC RX W HCPCS: Performed by: EMERGENCY MEDICINE

## 2024-05-12 PROCEDURE — 99284 EMERGENCY DEPT VISIT MOD MDM: CPT

## 2024-05-12 PROCEDURE — 90471 IMMUNIZATION ADMIN: CPT | Performed by: EMERGENCY MEDICINE

## 2024-05-12 PROCEDURE — 90714 TD VACC NO PRESV 7 YRS+ IM: CPT | Performed by: EMERGENCY MEDICINE

## 2024-05-12 PROCEDURE — 72125 CT NECK SPINE W/O DYE: CPT

## 2024-05-12 PROCEDURE — 70450 CT HEAD/BRAIN W/O DYE: CPT

## 2024-05-12 PROCEDURE — 12001 RPR S/N/AX/GEN/TRNK 2.5CM/<: CPT

## 2024-05-12 RX ORDER — TETANUS AND DIPHTHERIA TOXOIDS ADSORBED 2; 2 [LF]/.5ML; [LF]/.5ML
0.5 INJECTION INTRAMUSCULAR ONCE
Status: DISCONTINUED | OUTPATIENT
Start: 2024-05-12 | End: 2024-05-12

## 2024-05-12 RX ADMIN — CLOSTRIDIUM TETANI TOXOID ANTIGEN (FORMALDEHYDE INACTIVATED) AND CORYNEBACTERIUM DIPHTHERIAE TOXOID ANTIGEN (FORMALDEHYDE INACTIVATED) 0.5 ML: 5; 2 INJECTION, SUSPENSION INTRAMUSCULAR at 16:25

## 2024-05-12 ASSESSMENT — PAIN - FUNCTIONAL ASSESSMENT
PAIN_FUNCTIONAL_ASSESSMENT: 0-10
PAIN_FUNCTIONAL_ASSESSMENT: 0-10

## 2024-05-12 ASSESSMENT — PAIN SCALES - GENERAL
PAINLEVEL_OUTOF10: 0
PAINLEVEL_OUTOF10: 4

## 2024-05-12 NOTE — ED PROVIDER NOTES
Tolerated: well. Complications: none.  Jeff Gaviria D.O.     Smoking Cessation: None.    CRITICAL CARE TIME     Patient care does not meet critical care criteria.     DISPOSITION/PLAN   DISPOSITION Decision To Discharge 05/12/2024 05:36:12 PM      Discharged.     PATIENT REFERRED TO:  Karen Samano APRN - NP  [address]  [place] [address]  [phone]    In 10 days  For suture removal      DISCHARGE MEDICATIONS:     Medication List        ASK your doctor about these medications      amLODIPine 10 MG tablet  Commonly known as: NORVASC  Take 1 tablet by mouth daily     Aspirin Low Dose 81 MG EC tablet  Generic drug: aspirin  TAKE ONE TABLET BY MOUTH DAILY.     atorvastatin 40 MG tablet  Commonly known as: LIPITOR  Take 1 tablet by mouth daily     clopidogrel 75 MG tablet  Commonly known as: PLAVIX  Take 1 tablet by mouth daily     donepezil 5 MG tablet  Commonly known as: ARICEPT     glipiZIDE 5 MG tablet  Commonly known as: GLUCOTROL  TAKE 1 TABLET BY MOUTH TWICE DAILY.     Lantus SoloStar 100 UNIT/ML injection pen  Generic drug: insulin glargine  Inject 15 Units into the skin nightly     losartan 25 MG tablet  Commonly known as: COZAAR  Take 1 tablet by mouth daily     metoprolol tartrate 50 MG tablet  Commonly known as: LOPRESSOR  Take 1 tablet by mouth 2 times daily     NIFEdipine 60 MG extended release tablet  Commonly known as: PROCARDIA XL  Take 1 tablet by mouth daily     vitamin D 1.25 MG (81143 UT) Caps capsule  Commonly known as: ERGOCALCIFEROL  Take 1 capsule by mouth once a week              DISCONTINUED MEDICATIONS:  Current Discharge Medication List          ED FINAL IMPRESSION     1. Injury of head, initial encounter    2. Laceration of scalp, initial encounter    3. Fall, initial encounter         I am the primary provider of record. Jeff Gaviria DO (electronically signed)    (Please note that parts of this dictation were completed with voice recognition software. Quite often

## 2024-05-12 NOTE — ED TRIAGE NOTES
Unwitnessed GLF from standing position. Denies LOC.     +lac to back of head, c/o pain at that area.   -thinners

## 2024-05-30 ENCOUNTER — APPOINTMENT (OUTPATIENT)
Facility: HOSPITAL | Age: 64
DRG: 177 | End: 2024-05-30
Payer: MEDICARE

## 2024-05-30 ENCOUNTER — HOSPITAL ENCOUNTER (INPATIENT)
Facility: HOSPITAL | Age: 64
LOS: 11 days | Discharge: NURSING, REHAB OR LONG-TERM CARE | DRG: 177 | End: 2024-06-10
Attending: EMERGENCY MEDICINE | Admitting: STUDENT IN AN ORGANIZED HEALTH CARE EDUCATION/TRAINING PROGRAM
Payer: MEDICARE

## 2024-05-30 DIAGNOSIS — N17.9 ACUTE RENAL FAILURE, UNSPECIFIED ACUTE RENAL FAILURE TYPE (HCC): ICD-10-CM

## 2024-05-30 DIAGNOSIS — R11.2 NAUSEA AND VOMITING, UNSPECIFIED VOMITING TYPE: Primary | ICD-10-CM

## 2024-05-30 DIAGNOSIS — J18.9 MULTIFOCAL PNEUMONIA: ICD-10-CM

## 2024-05-30 DIAGNOSIS — E78.2 MIXED HYPERLIPIDEMIA: ICD-10-CM

## 2024-05-30 DIAGNOSIS — I10 PRIMARY HYPERTENSION: ICD-10-CM

## 2024-05-30 PROBLEM — J15.9 COMMUNITY ACQUIRED BACTERIAL PNEUMONIA: Status: ACTIVE | Noted: 2024-05-30

## 2024-05-30 LAB
ABO + RH BLD: NORMAL
ALBUMIN SERPL-MCNC: 3.3 G/DL (ref 3.5–5)
ALBUMIN/GLOB SERPL: 0.7 (ref 1.1–2.2)
ALP SERPL-CCNC: 149 U/L (ref 45–117)
ALT SERPL-CCNC: 27 U/L (ref 12–78)
ANION GAP SERPL CALC-SCNC: 5 MMOL/L (ref 5–15)
ANION GAP SERPL CALC-SCNC: 8 MMOL/L (ref 5–15)
AST SERPL W P-5'-P-CCNC: 25 U/L (ref 15–37)
BASOPHILS # BLD: 0 K/UL (ref 0–0.1)
BASOPHILS NFR BLD: 0 % (ref 0–1)
BILIRUB SERPL-MCNC: 0.3 MG/DL (ref 0.2–1)
BLOOD GROUP ANTIBODIES SERPL: NEGATIVE
BUN SERPL-MCNC: 78 MG/DL (ref 6–20)
BUN SERPL-MCNC: 86 MG/DL (ref 6–20)
BUN/CREAT SERPL: 15 (ref 12–20)
BUN/CREAT SERPL: 15 (ref 12–20)
CA-I BLD-MCNC: 10 MG/DL (ref 8.5–10.1)
CA-I BLD-MCNC: 8.4 MG/DL (ref 8.5–10.1)
CHLORIDE SERPL-SCNC: 105 MMOL/L (ref 97–108)
CHLORIDE SERPL-SCNC: 113 MMOL/L (ref 97–108)
CO2 SERPL-SCNC: 24 MMOL/L (ref 21–32)
CO2 SERPL-SCNC: 26 MMOL/L (ref 21–32)
CREAT SERPL-MCNC: 5.23 MG/DL (ref 0.7–1.3)
CREAT SERPL-MCNC: 5.93 MG/DL (ref 0.7–1.3)
DIFFERENTIAL METHOD BLD: ABNORMAL
EKG ATRIAL RATE: 85 BPM
EKG DIAGNOSIS: NORMAL
EKG P AXIS: 62 DEGREES
EKG P-R INTERVAL: 144 MS
EKG Q-T INTERVAL: 378 MS
EKG QRS DURATION: 84 MS
EKG QTC CALCULATION (BAZETT): 449 MS
EKG R AXIS: 67 DEGREES
EKG T AXIS: 78 DEGREES
EKG VENTRICULAR RATE: 85 BPM
EOSINOPHIL # BLD: 0.1 K/UL (ref 0–0.4)
EOSINOPHIL NFR BLD: 1 % (ref 0–7)
ERYTHROCYTE [DISTWIDTH] IN BLOOD BY AUTOMATED COUNT: 14 % (ref 11.5–14.5)
EST. AVERAGE GLUCOSE BLD GHB EST-MCNC: 100 MG/DL
GLOBULIN SER CALC-MCNC: 4.7 G/DL (ref 2–4)
GLUCOSE BLD STRIP.AUTO-MCNC: 103 MG/DL (ref 65–100)
GLUCOSE BLD STRIP.AUTO-MCNC: 108 MG/DL (ref 65–100)
GLUCOSE BLD STRIP.AUTO-MCNC: 176 MG/DL (ref 65–100)
GLUCOSE SERPL-MCNC: 107 MG/DL (ref 65–100)
GLUCOSE SERPL-MCNC: 190 MG/DL (ref 65–100)
HBA1C MFR BLD: 5.1 % (ref 4–5.6)
HCT VFR BLD AUTO: 39.9 % (ref 36.6–50.3)
HGB BLD-MCNC: 13.2 G/DL (ref 12.1–17)
IMM GRANULOCYTES # BLD AUTO: 0.1 K/UL (ref 0–0.04)
IMM GRANULOCYTES NFR BLD AUTO: 1 % (ref 0–0.5)
INR PPP: 0.9 (ref 0.9–1.1)
LIPASE SERPL-CCNC: 105 U/L (ref 13–75)
LYMPHOCYTES # BLD: 0.7 K/UL (ref 0.8–3.5)
LYMPHOCYTES NFR BLD: 4 % (ref 12–49)
MCH RBC QN AUTO: 31.7 PG (ref 26–34)
MCHC RBC AUTO-ENTMCNC: 33.1 G/DL (ref 30–36.5)
MCV RBC AUTO: 95.7 FL (ref 80–99)
MONOCYTES # BLD: 0.6 K/UL (ref 0–1)
MONOCYTES NFR BLD: 3 % (ref 5–13)
NEUTS SEG # BLD: 17.7 K/UL (ref 1.8–8)
NEUTS SEG NFR BLD: 91 % (ref 32–75)
NRBC # BLD: 0 K/UL (ref 0–0.01)
NRBC BLD-RTO: 0 PER 100 WBC
PERFORMED BY:: ABNORMAL
PLATELET # BLD AUTO: 402 K/UL (ref 150–400)
PMV BLD AUTO: 9.5 FL (ref 8.9–12.9)
POTASSIUM SERPL-SCNC: 5.1 MMOL/L (ref 3.5–5.1)
POTASSIUM SERPL-SCNC: 5.6 MMOL/L (ref 3.5–5.1)
PROCALCITONIN SERPL-MCNC: 2.51 NG/ML
PROT SERPL-MCNC: 8 G/DL (ref 6.4–8.2)
PROTHROMBIN TIME: 13 SEC (ref 11.9–14.6)
RBC # BLD AUTO: 4.17 M/UL (ref 4.1–5.7)
SODIUM SERPL-SCNC: 139 MMOL/L (ref 136–145)
SODIUM SERPL-SCNC: 142 MMOL/L (ref 136–145)
SPECIMEN EXP DATE BLD: NORMAL
TROPONIN I SERPL HS-MCNC: 17 NG/L (ref 0–76)
TSH SERPL DL<=0.05 MIU/L-ACNC: 4.02 UIU/ML (ref 0.36–3.74)
WBC # BLD AUTO: 19.2 K/UL (ref 4.1–11.1)

## 2024-05-30 PROCEDURE — 6360000002 HC RX W HCPCS: Performed by: EMERGENCY MEDICINE

## 2024-05-30 PROCEDURE — 86850 RBC ANTIBODY SCREEN: CPT

## 2024-05-30 PROCEDURE — 2580000003 HC RX 258: Performed by: STUDENT IN AN ORGANIZED HEALTH CARE EDUCATION/TRAINING PROGRAM

## 2024-05-30 PROCEDURE — 99285 EMERGENCY DEPT VISIT HI MDM: CPT

## 2024-05-30 PROCEDURE — 85025 COMPLETE CBC W/AUTO DIFF WBC: CPT

## 2024-05-30 PROCEDURE — 71045 X-RAY EXAM CHEST 1 VIEW: CPT

## 2024-05-30 PROCEDURE — 83036 HEMOGLOBIN GLYCOSYLATED A1C: CPT

## 2024-05-30 PROCEDURE — 6370000000 HC RX 637 (ALT 250 FOR IP): Performed by: EMERGENCY MEDICINE

## 2024-05-30 PROCEDURE — 6370000000 HC RX 637 (ALT 250 FOR IP): Performed by: STUDENT IN AN ORGANIZED HEALTH CARE EDUCATION/TRAINING PROGRAM

## 2024-05-30 PROCEDURE — 6360000002 HC RX W HCPCS: Performed by: STUDENT IN AN ORGANIZED HEALTH CARE EDUCATION/TRAINING PROGRAM

## 2024-05-30 PROCEDURE — 51798 US URINE CAPACITY MEASURE: CPT

## 2024-05-30 PROCEDURE — 74176 CT ABD & PELVIS W/O CONTRAST: CPT

## 2024-05-30 PROCEDURE — 84443 ASSAY THYROID STIM HORMONE: CPT

## 2024-05-30 PROCEDURE — 96365 THER/PROPH/DIAG IV INF INIT: CPT

## 2024-05-30 PROCEDURE — 94761 N-INVAS EAR/PLS OXIMETRY MLT: CPT

## 2024-05-30 PROCEDURE — 83690 ASSAY OF LIPASE: CPT

## 2024-05-30 PROCEDURE — 2500000003 HC RX 250 WO HCPCS: Performed by: EMERGENCY MEDICINE

## 2024-05-30 PROCEDURE — 80048 BASIC METABOLIC PNL TOTAL CA: CPT

## 2024-05-30 PROCEDURE — 87040 BLOOD CULTURE FOR BACTERIA: CPT

## 2024-05-30 PROCEDURE — 1100000000 HC RM PRIVATE

## 2024-05-30 PROCEDURE — 86738 MYCOPLASMA ANTIBODY: CPT

## 2024-05-30 PROCEDURE — 82962 GLUCOSE BLOOD TEST: CPT

## 2024-05-30 PROCEDURE — 84145 PROCALCITONIN (PCT): CPT

## 2024-05-30 PROCEDURE — 84484 ASSAY OF TROPONIN QUANT: CPT

## 2024-05-30 PROCEDURE — 86901 BLOOD TYPING SEROLOGIC RH(D): CPT

## 2024-05-30 PROCEDURE — 2580000003 HC RX 258: Performed by: EMERGENCY MEDICINE

## 2024-05-30 PROCEDURE — 2700000000 HC OXYGEN THERAPY PER DAY

## 2024-05-30 PROCEDURE — 2580000003 HC RX 258: Performed by: INTERNAL MEDICINE

## 2024-05-30 PROCEDURE — 80053 COMPREHEN METABOLIC PANEL: CPT

## 2024-05-30 PROCEDURE — 96375 TX/PRO/DX INJ NEW DRUG ADDON: CPT

## 2024-05-30 PROCEDURE — 93005 ELECTROCARDIOGRAM TRACING: CPT | Performed by: EMERGENCY MEDICINE

## 2024-05-30 PROCEDURE — 6370000000 HC RX 637 (ALT 250 FOR IP): Performed by: INTERNAL MEDICINE

## 2024-05-30 PROCEDURE — 85610 PROTHROMBIN TIME: CPT

## 2024-05-30 PROCEDURE — 86900 BLOOD TYPING SEROLOGIC ABO: CPT

## 2024-05-30 RX ORDER — CALCIUM CARBONATE 500(1250)
1000 TABLET ORAL DAILY
COMMUNITY

## 2024-05-30 RX ORDER — LABETALOL HYDROCHLORIDE 5 MG/ML
10 INJECTION, SOLUTION INTRAVENOUS EVERY 6 HOURS PRN
Status: DISCONTINUED | OUTPATIENT
Start: 2024-05-30 | End: 2024-06-10 | Stop reason: HOSPADM

## 2024-05-30 RX ORDER — POLYETHYLENE GLYCOL 3350 17 G/17G
17 POWDER, FOR SOLUTION ORAL DAILY PRN
Status: DISCONTINUED | OUTPATIENT
Start: 2024-05-30 | End: 2024-06-10 | Stop reason: HOSPADM

## 2024-05-30 RX ORDER — ERGOCALCIFEROL 1.25 MG/1
50000 CAPSULE ORAL WEEKLY
Status: DISCONTINUED | OUTPATIENT
Start: 2024-05-31 | End: 2024-06-10 | Stop reason: HOSPADM

## 2024-05-30 RX ORDER — LOPERAMIDE HYDROCHLORIDE 2 MG/1
2 CAPSULE ORAL 4 TIMES DAILY PRN
COMMUNITY

## 2024-05-30 RX ORDER — IPRATROPIUM BROMIDE AND ALBUTEROL SULFATE 2.5; .5 MG/3ML; MG/3ML
1 SOLUTION RESPIRATORY (INHALATION)
Status: COMPLETED | OUTPATIENT
Start: 2024-05-30 | End: 2024-05-30

## 2024-05-30 RX ORDER — CLOPIDOGREL BISULFATE 75 MG/1
75 TABLET ORAL DAILY
Status: DISCONTINUED | OUTPATIENT
Start: 2024-05-30 | End: 2024-06-10 | Stop reason: HOSPADM

## 2024-05-30 RX ORDER — 0.9 % SODIUM CHLORIDE 0.9 %
30 INTRAVENOUS SOLUTION INTRAVENOUS ONCE
Status: COMPLETED | OUTPATIENT
Start: 2024-05-30 | End: 2024-05-30

## 2024-05-30 RX ORDER — BUPROPION HYDROCHLORIDE 150 MG/1
150 TABLET, EXTENDED RELEASE ORAL 2 TIMES DAILY
Status: DISCONTINUED | OUTPATIENT
Start: 2024-05-30 | End: 2024-06-10 | Stop reason: HOSPADM

## 2024-05-30 RX ORDER — ACETAMINOPHEN 650 MG/1
650 SUPPOSITORY RECTAL EVERY 6 HOURS PRN
Status: DISCONTINUED | OUTPATIENT
Start: 2024-05-30 | End: 2024-06-10 | Stop reason: HOSPADM

## 2024-05-30 RX ORDER — 0.9 % SODIUM CHLORIDE 0.9 %
1000 INTRAVENOUS SOLUTION INTRAVENOUS ONCE
Status: DISCONTINUED | OUTPATIENT
Start: 2024-05-30 | End: 2024-05-30

## 2024-05-30 RX ORDER — ASPIRIN 81 MG/1
81 TABLET, CHEWABLE ORAL DAILY
Status: DISCONTINUED | OUTPATIENT
Start: 2024-05-30 | End: 2024-06-10 | Stop reason: HOSPADM

## 2024-05-30 RX ORDER — INSULIN LISPRO 100 [IU]/ML
0-4 INJECTION, SOLUTION INTRAVENOUS; SUBCUTANEOUS
Status: DISCONTINUED | OUTPATIENT
Start: 2024-05-30 | End: 2024-06-10 | Stop reason: HOSPADM

## 2024-05-30 RX ORDER — ONDANSETRON 2 MG/ML
4 INJECTION INTRAMUSCULAR; INTRAVENOUS ONCE
Status: COMPLETED | OUTPATIENT
Start: 2024-05-30 | End: 2024-05-30

## 2024-05-30 RX ORDER — ACETAMINOPHEN 325 MG/1
650 TABLET ORAL EVERY 6 HOURS PRN
Status: DISCONTINUED | OUTPATIENT
Start: 2024-05-30 | End: 2024-06-10 | Stop reason: HOSPADM

## 2024-05-30 RX ORDER — DONEPEZIL HYDROCHLORIDE 5 MG/1
5 TABLET, FILM COATED ORAL EVERY MORNING
Status: DISCONTINUED | OUTPATIENT
Start: 2024-05-30 | End: 2024-06-10 | Stop reason: HOSPADM

## 2024-05-30 RX ORDER — LANOLIN ALCOHOL/MO/W.PET/CERES
3 CREAM (GRAM) TOPICAL DAILY
COMMUNITY

## 2024-05-30 RX ORDER — TRAZODONE HYDROCHLORIDE 50 MG/1
50 TABLET ORAL NIGHTLY PRN
Status: DISCONTINUED | OUTPATIENT
Start: 2024-05-30 | End: 2024-06-10 | Stop reason: HOSPADM

## 2024-05-30 RX ORDER — BUPROPION HYDROCHLORIDE 150 MG/1
150 TABLET ORAL EVERY MORNING
COMMUNITY

## 2024-05-30 RX ORDER — TAMSULOSIN HYDROCHLORIDE 0.4 MG/1
0.4 CAPSULE ORAL DAILY
COMMUNITY

## 2024-05-30 RX ORDER — METOPROLOL TARTRATE 50 MG/1
50 TABLET, FILM COATED ORAL 2 TIMES DAILY
Status: DISCONTINUED | OUTPATIENT
Start: 2024-05-30 | End: 2024-06-10 | Stop reason: HOSPADM

## 2024-05-30 RX ORDER — ONDANSETRON 4 MG/1
4 TABLET, ORALLY DISINTEGRATING ORAL EVERY 8 HOURS PRN
Status: DISCONTINUED | OUTPATIENT
Start: 2024-05-30 | End: 2024-06-01

## 2024-05-30 RX ORDER — SODIUM CHLORIDE 0.9 % (FLUSH) 0.9 %
5-40 SYRINGE (ML) INJECTION EVERY 12 HOURS SCHEDULED
Status: DISCONTINUED | OUTPATIENT
Start: 2024-05-30 | End: 2024-06-10 | Stop reason: HOSPADM

## 2024-05-30 RX ORDER — LEVOTHYROXINE SODIUM 0.03 MG/1
25 TABLET ORAL DAILY
Status: DISCONTINUED | OUTPATIENT
Start: 2024-05-30 | End: 2024-06-10 | Stop reason: HOSPADM

## 2024-05-30 RX ORDER — HYDRALAZINE HYDROCHLORIDE 25 MG/1
25 TABLET, FILM COATED ORAL 3 TIMES DAILY
COMMUNITY

## 2024-05-30 RX ORDER — LEVOTHYROXINE SODIUM 0.03 MG/1
25 TABLET ORAL DAILY
COMMUNITY

## 2024-05-30 RX ORDER — INSULIN LISPRO 100 [IU]/ML
0-4 INJECTION, SOLUTION INTRAVENOUS; SUBCUTANEOUS NIGHTLY
Status: DISCONTINUED | OUTPATIENT
Start: 2024-05-30 | End: 2024-06-10 | Stop reason: HOSPADM

## 2024-05-30 RX ORDER — SODIUM CHLORIDE 9 MG/ML
INJECTION, SOLUTION INTRAVENOUS CONTINUOUS
Status: DISCONTINUED | OUTPATIENT
Start: 2024-05-30 | End: 2024-06-01

## 2024-05-30 RX ORDER — SODIUM CHLORIDE 0.9 % (FLUSH) 0.9 %
5-40 SYRINGE (ML) INJECTION PRN
Status: DISCONTINUED | OUTPATIENT
Start: 2024-05-30 | End: 2024-06-10 | Stop reason: HOSPADM

## 2024-05-30 RX ORDER — ONDANSETRON 4 MG/1
4 TABLET, FILM COATED ORAL EVERY 6 HOURS PRN
COMMUNITY

## 2024-05-30 RX ORDER — TRAZODONE HYDROCHLORIDE 50 MG/1
50 TABLET ORAL NIGHTLY
COMMUNITY

## 2024-05-30 RX ORDER — HEPARIN SODIUM 5000 [USP'U]/ML
5000 INJECTION, SOLUTION INTRAVENOUS; SUBCUTANEOUS EVERY 8 HOURS SCHEDULED
Status: DISCONTINUED | OUTPATIENT
Start: 2024-05-30 | End: 2024-06-10 | Stop reason: HOSPADM

## 2024-05-30 RX ORDER — SODIUM CHLORIDE 9 MG/ML
INJECTION, SOLUTION INTRAVENOUS PRN
Status: DISCONTINUED | OUTPATIENT
Start: 2024-05-30 | End: 2024-06-10 | Stop reason: HOSPADM

## 2024-05-30 RX ORDER — FERROUS SULFATE 325(65) MG
325 TABLET ORAL
COMMUNITY

## 2024-05-30 RX ORDER — NIFEDIPINE 60 MG/1
60 TABLET, EXTENDED RELEASE ORAL DAILY
Status: DISCONTINUED | OUTPATIENT
Start: 2024-05-30 | End: 2024-05-30

## 2024-05-30 RX ORDER — AMLODIPINE BESYLATE 5 MG/1
10 TABLET ORAL DAILY
Status: DISCONTINUED | OUTPATIENT
Start: 2024-05-30 | End: 2024-06-10 | Stop reason: HOSPADM

## 2024-05-30 RX ORDER — LACTOBACILLUS RHAMNOSUS GG 10B CELL
1 CAPSULE ORAL
Status: DISCONTINUED | OUTPATIENT
Start: 2024-05-31 | End: 2024-06-10 | Stop reason: HOSPADM

## 2024-05-30 RX ORDER — ACETAMINOPHEN 325 MG/1
650 TABLET ORAL EVERY 6 HOURS PRN
COMMUNITY

## 2024-05-30 RX ORDER — SERTRALINE HYDROCHLORIDE 150 MG/1
1 CAPSULE ORAL DAILY
COMMUNITY

## 2024-05-30 RX ORDER — INSULIN GLARGINE 100 [IU]/ML
15 INJECTION, SOLUTION SUBCUTANEOUS NIGHTLY
Status: DISCONTINUED | OUTPATIENT
Start: 2024-05-30 | End: 2024-06-10 | Stop reason: HOSPADM

## 2024-05-30 RX ORDER — TAMSULOSIN HYDROCHLORIDE 0.4 MG/1
0.4 CAPSULE ORAL DAILY
Status: DISCONTINUED | OUTPATIENT
Start: 2024-05-30 | End: 2024-06-10 | Stop reason: HOSPADM

## 2024-05-30 RX ORDER — ATORVASTATIN CALCIUM 40 MG/1
40 TABLET, FILM COATED ORAL DAILY
Status: DISCONTINUED | OUTPATIENT
Start: 2024-05-30 | End: 2024-06-10 | Stop reason: HOSPADM

## 2024-05-30 RX ORDER — VANCOMYCIN HYDROCHLORIDE 125 MG/1
125 CAPSULE ORAL 4 TIMES DAILY
Status: DISPENSED | OUTPATIENT
Start: 2024-05-30 | End: 2024-06-01

## 2024-05-30 RX ORDER — LOSARTAN POTASSIUM 25 MG/1
25 TABLET ORAL DAILY
Status: DISCONTINUED | OUTPATIENT
Start: 2024-05-30 | End: 2024-06-08

## 2024-05-30 RX ORDER — ONDANSETRON 2 MG/ML
4 INJECTION INTRAMUSCULAR; INTRAVENOUS EVERY 6 HOURS PRN
Status: DISCONTINUED | OUTPATIENT
Start: 2024-05-30 | End: 2024-06-01

## 2024-05-30 RX ADMIN — VANCOMYCIN HYDROCHLORIDE 125 MG: 125 CAPSULE ORAL at 17:29

## 2024-05-30 RX ADMIN — SODIUM CHLORIDE, PRESERVATIVE FREE 10 ML: 5 INJECTION INTRAVENOUS at 12:36

## 2024-05-30 RX ADMIN — DONEPEZIL HYDROCHLORIDE 5 MG: 5 TABLET, FILM COATED ORAL at 12:30

## 2024-05-30 RX ADMIN — BUPROPION HYDROCHLORIDE 150 MG: 150 TABLET, FILM COATED, EXTENDED RELEASE ORAL at 12:30

## 2024-05-30 RX ADMIN — LEVOTHYROXINE SODIUM 25 MCG: 0.03 TABLET ORAL at 12:31

## 2024-05-30 RX ADMIN — CLOPIDOGREL BISULFATE 75 MG: 75 TABLET ORAL at 12:28

## 2024-05-30 RX ADMIN — ATORVASTATIN CALCIUM 40 MG: 40 TABLET, FILM COATED ORAL at 12:28

## 2024-05-30 RX ADMIN — SODIUM CHLORIDE, PRESERVATIVE FREE 10 ML: 5 INJECTION INTRAVENOUS at 22:13

## 2024-05-30 RX ADMIN — IPRATROPIUM BROMIDE AND ALBUTEROL SULFATE 1 DOSE: .5; 3 SOLUTION RESPIRATORY (INHALATION) at 06:43

## 2024-05-30 RX ADMIN — AMLODIPINE BESYLATE 10 MG: 5 TABLET ORAL at 12:28

## 2024-05-30 RX ADMIN — SODIUM CHLORIDE: 9 INJECTION, SOLUTION INTRAVENOUS at 22:02

## 2024-05-30 RX ADMIN — SODIUM ZIRCONIUM CYCLOSILICATE 10 G: 10 POWDER, FOR SUSPENSION ORAL at 10:29

## 2024-05-30 RX ADMIN — VANCOMYCIN HYDROCHLORIDE 125 MG: 125 CAPSULE ORAL at 12:32

## 2024-05-30 RX ADMIN — SERTRALINE HYDROCHLORIDE 150 MG: 50 TABLET ORAL at 12:32

## 2024-05-30 RX ADMIN — CEFTRIAXONE SODIUM 1000 MG: 1 INJECTION, POWDER, FOR SOLUTION INTRAMUSCULAR; INTRAVENOUS at 12:33

## 2024-05-30 RX ADMIN — INSULIN LISPRO 1 UNITS: 100 INJECTION, SOLUTION INTRAVENOUS; SUBCUTANEOUS at 12:23

## 2024-05-30 RX ADMIN — ASPIRIN 81 MG 81 MG: 81 TABLET ORAL at 12:27

## 2024-05-30 RX ADMIN — METOPROLOL TARTRATE 50 MG: 50 TABLET, FILM COATED ORAL at 12:31

## 2024-05-30 RX ADMIN — AZITHROMYCIN 250 MG: 500 INJECTION, POWDER, LYOPHILIZED, FOR SOLUTION INTRAVENOUS at 14:43

## 2024-05-30 RX ADMIN — SODIUM CHLORIDE 1632 ML: 9 INJECTION, SOLUTION INTRAVENOUS at 06:21

## 2024-05-30 RX ADMIN — PIPERACILLIN AND TAZOBACTAM 4500 MG: 4; .5 INJECTION, POWDER, LYOPHILIZED, FOR SOLUTION INTRAVENOUS at 06:26

## 2024-05-30 RX ADMIN — SODIUM CHLORIDE, PRESERVATIVE FREE 20 MG: 5 INJECTION INTRAVENOUS at 05:58

## 2024-05-30 RX ADMIN — LABETALOL HYDROCHLORIDE 10 MG: 5 INJECTION, SOLUTION INTRAVENOUS at 22:09

## 2024-05-30 RX ADMIN — VANCOMYCIN HYDROCHLORIDE 1250 MG: 1.25 INJECTION, POWDER, LYOPHILIZED, FOR SOLUTION INTRAVENOUS at 06:26

## 2024-05-30 RX ADMIN — SODIUM CHLORIDE: 9 INJECTION, SOLUTION INTRAVENOUS at 10:28

## 2024-05-30 RX ADMIN — ONDANSETRON 4 MG: 2 INJECTION INTRAMUSCULAR; INTRAVENOUS at 05:58

## 2024-05-30 RX ADMIN — TAMSULOSIN HYDROCHLORIDE 0.4 MG: 0.4 CAPSULE ORAL at 12:28

## 2024-05-30 RX ADMIN — HEPARIN SODIUM 5000 UNITS: 5000 INJECTION INTRAVENOUS; SUBCUTANEOUS at 15:19

## 2024-05-30 ASSESSMENT — PAIN SCALES - GENERAL: PAINLEVEL_OUTOF10: 0

## 2024-05-30 ASSESSMENT — LIFESTYLE VARIABLES
HOW MANY STANDARD DRINKS CONTAINING ALCOHOL DO YOU HAVE ON A TYPICAL DAY: PATIENT DOES NOT DRINK
HOW OFTEN DO YOU HAVE A DRINK CONTAINING ALCOHOL: NEVER

## 2024-05-30 NOTE — H&P
DOSE 81 MG EC tablet TAKE ONE TABLET BY MOUTH DAILY. 1/23/24   Maryuri Cartagena PA-C   glipiZIDE (GLUCOTROL) 5 MG tablet TAKE 1 TABLET BY MOUTH TWICE DAILY. 10/23/23   Maryuri Cartagena PA-C   amLODIPine (NORVASC) 10 MG tablet Take 1 tablet by mouth daily 10/3/23   Maryuri Cartagena PA-C   atorvastatin (LIPITOR) 40 MG tablet Take 1 tablet by mouth daily 10/3/23   Maryuri Cartagena PA-C   clopidogrel (PLAVIX) 75 MG tablet Take 1 tablet by mouth daily 10/3/23   Maryuri Cartagena PA-C   losartan (COZAAR) 25 MG tablet Take 1 tablet by mouth daily 10/3/23   Maryuri Cartagena PA-C   metoprolol tartrate (LOPRESSOR) 50 MG tablet Take 1 tablet by mouth 2 times daily 10/3/23   Maryuri Cartagena PA-C   NIFEdipine (PROCARDIA XL) 60 MG extended release tablet Take 1 tablet by mouth daily 10/3/23   Maryuri Cartagena PA-C   vitamin D (ERGOCALCIFEROL) 1.25 MG (44219 UT) CAPS capsule Take 1 capsule by mouth once a week 10/3/23   Maryuri Cartagena PA-C   insulin glargine (LANTUS SOLOSTAR) 100 UNIT/ML injection pen Inject 15 Units into the skin nightly 10/3/23   Yifan Hirsch APRN - NP       Labs: Personally reviewed and interpreted for clinical significance.   Recent Labs     05/30/24  0509   WBC 19.2*   HGB 13.2   HCT 39.9   *     Recent Labs     05/30/24  0509      K 5.6*      CO2 26   BUN 86*   CREATININE 5.93*   CALCIUM 10.0     Recent Labs     05/30/24  0509   TROPHS 17     No results for input(s): \"LABA1C\" in the last 72 hours.  Recent Labs     05/30/24  0509   AST 25   ALT 27   BILITOT 0.3   ALKPHOS 149*     Recent Labs     05/30/24  0510   INR 0.9        Saroj Avalos MD

## 2024-05-30 NOTE — ED NOTES
ED TO INPATIENT SBAR HANDOFF    Patient Name: Kvng Arellano   Preferred Name: Kvng  : 1960  64 y.o.   Family/Caregiver Present: no   Code Status Order: Full Code  PO Status: NPO:  Telemetry Order:   C-SSRS: Risk of Suicide: No Risk  Sitter no   Restraints:     Sepsis Risk Score Sepsis Risk Score: 8.34    Situation  Chief Complaint   Patient presents with    Emesis     Brief Description of Patient's Condition: Pt coming from [facility] and rehab. Vomiting was CC found with decreased SPO2. Found pneumonia an dehydration with naomi.   Mental Status: oriented and alert  Arrived from:Home  Imaging:   CT CHEST ABDOMEN PELVIS WO CONTRAST Additional Contrast? None   Final Result   Bilateral multifocal groundglass opacities, patchy airspace disease   and tree-in-bud opacities, predominantly in the mid and lower lungs. Findings   are likely infectious in etiology. Recommend short interval follow-up to confirm   complete resolution.               XR CHEST PORTABLE   Final Result   Mild bilateral midlung patchy airspace disease, left greater than   right. Recommend short-term follow-up to confirm resolution. CT can be performed   for further evaluation, if indicated.           Abnormal labs:   Abnormal Labs Reviewed   CBC WITH AUTO DIFFERENTIAL - Abnormal; Notable for the following components:       Result Value    WBC 19.2 (*)     Platelets 402 (*)     Neutrophils % 91 (*)     Lymphocytes % 4 (*)     Monocytes % 3 (*)     Immature Granulocytes % 1 (*)     Neutrophils Absolute 17.7 (*)     Lymphocytes Absolute 0.7 (*)     Immature Granulocytes Absolute 0.1 (*)     All other components within normal limits   COMPREHENSIVE METABOLIC PANEL - Abnormal; Notable for the following components:    Potassium 5.6 (*)     Glucose 190 (*)     BUN 86 (*)     Creatinine 5.93 (*)     Est, Glom Filt Rate 10 (*)     Alk Phosphatase 149 (*)     Albumin 3.3 (*)     Globulin 4.7 (*)     Albumin/Globulin Ratio 0.7 (*)

## 2024-05-30 NOTE — ED PROVIDER NOTES
Date of Service: 5/30/2024  4:46 AM     Expand All Collapse All    [facility] EMERGENCY DEPT  EMERGENCY DEPARTMENT HISTORY AND PHYSICAL EXAM        Date: 5/30/2024  Patient Name: Kvng Arellano        History of Presenting Illness             Chief Complaint   Patient presents with    Emesis         History was provided by: Patient and Nursing Home/SNF     Location/Duration/Severity/Modifying factors      Kvng Arellano is a 64 y.o. male who arrived to the emergency department by by EMS where they received No additional treatment with complaints of Emesis           Patient is a 64-year-old male who presents to the emergency room with a chief complaint of vomiting.  Coming from local nursing facility.  He says vomitings been going on for days.  He says that they have been giving him medications but nothing seems to improve his symptoms.  Denies noting any bloody or black or tarry stools or black in his emesis.  Found to be hypoxic at the scene so he was put on 4 L nasal cannula.  Reportedly 87 or 88%.  Per chart review is currently receiving p.o. vancomycin for C. difficile infection.              There are no other complaints, changes, or physical findings at this time.     PCP: Karen Samano APRN - NP     Current Facility-Administered Medications   No current facility-administered medications for this encounter.             Current Outpatient Medications   Medication Sig Dispense Refill    donepezil (ARICEPT) 5 MG tablet Take 1 tablet by mouth every morning        ASPIRIN LOW DOSE 81 MG EC tablet TAKE ONE TABLET BY MOUTH DAILY. 30 tablet 0    glipiZIDE (GLUCOTROL) 5 MG tablet TAKE 1 TABLET BY MOUTH TWICE DAILY. 60 tablet 0    amLODIPine (NORVASC) 10 MG tablet Take 1 tablet by mouth daily 90 tablet 0    atorvastatin (LIPITOR) 40 MG tablet Take 1 tablet by mouth daily 90 tablet 0    clopidogrel (PLAVIX) 75 MG tablet Take 1 tablet by mouth daily 90 tablet 0    losartan (COZAAR) 25 MG tablet Take 1 tablet by

## 2024-05-30 NOTE — CARE COORDINATION
05/30/24 1126   Service Assessment   Patient Orientation Unable to Assess  (Sleeping)   Cognition Other (see comment)   History Provided By Friend;Other (see comment)  (Caregiver Adele Mcfadden)   Primary Caregiver Other (Comment)  (Facility)   Accompanied By/Relationship Pt alone during visit.  CM spoke with ( caregiver) Adele Mcfadden via phone.   Support Systems Friends/Neighbors   Patient's Healthcare Decision Maker is: Legal Next of Kin   PCP Verified by CM Yes  (Shanti Velazquez - see @ the facility.)   Last Visit to PCP Within last 3 months   Prior Functional Level Assistance with the following:;Bathing;Dressing;Cooking;Housework;Shopping   Current Functional Level Assistance with the following:;Bathing;Dressing;Cooking;Housework;Shopping   Can patient return to prior living arrangement Yes   Ability to make needs known: Poor  (Per caregiver some confusion.)   Family able to assist with home care needs: Other (comment)  (Facility)   Would you like for me to discuss the discharge plan with any other family members/significant others, and if so, who? Yes  (Caregiver Adele Mcfadden)   Financial Resources Medicare   Community Resources None   CM/SW Referral Other (see comment)  (None)   Social/Functional History   Lives With Other (comment)  (Facility)   Type of Home Facility  (Adele Mcfadden gave verbal consent via phone toreturn to [facility]/Referral.)   Home Layout One level   Home Access Level entry   Bathroom Shower/Tub Walk-in shower   Bathroom Toilet Handicap height   Bathroom Equipment Grab bars in shower   Bathroom Accessibility Wheelchair accessible   Home Equipment None   Receives Help From Other (comment)  (Facility)   ADL Assistance Needs assistance   Toileting Independent   Homemaking Assistance Needs assistance   Homemaking Responsibilities Yes   Ambulation Assistance Independent   Transfer Assistance Independent   Active  No   Occupation On disability   Discharge Planning

## 2024-05-30 NOTE — ED TRIAGE NOTES
Pt arrives from [facility] and rehab for 3 episodes of coffee ground emesis this morning. When EMS arrived, patient was saturating at 88% on room air, subsequently placed on 3L NC, and yang to 98%.  for EMS. Nursing facility attempted IV access twice, unsuccessfully.   
resuscitated.   [RHIANNON]   0811 Labs show acute renal failure, mild hyperkalemia, patient given IV fluids.  Will likely require more.  CT chest abdomen pelvis shows multifocal infiltrates.  The likely cause for his sepsis.  The patient will be admitted to the hospitalist service upon consultation with hospitalist. [RHIANNON]      ED Course User Index  [RHIANNON] Stephan Vela DO     Sepsis Reassessment: See ED Course for Sepsis Reassessment  ------------------------------------------------------------------------------------------------------------  SCREENINGS             No data recorded          Consultations:     CONSULTS:  None    See the ED course section or MDM, if applicable for details on the content of consultations requested.    Procedures and Critical Care       Performed by: Stephan Vela DO    Procedures             CRITICAL CARE NOTE :  8:13 AM  CRITICAL CARE TIME: CRITICAL CARE NOTE:    I have spent 35 minutes of critical care time involved in lab review, consultations with specialist, family decision-making, and documentation.  During this entire length of time I was immediately available to the patient.           Critical Care:  The reason for providing this level of medical care for this critically ill patient was due a critical illness that impaired one or more vital organ systems such that there was a high probability of imminent or life threatening deterioration in the patients condition. This care involved high complexity decision making to assess, manipulate, and support vital system functions, to treat this vital organ system failure and to prevent further life threatening deterioration of the patient’s condition. Aggregate Critical Care Time is exclusive of procedural and teaching time.    DO Stephan Marie DO      Disposition       Disposition: Admit to Floor    ADMISSION: Patient is stable for Admission to the Hospital at this time. Discussed with admitting provider at time of hand off.

## 2024-05-31 LAB
ALBUMIN SERPL-MCNC: 2.4 G/DL (ref 3.5–5)
ALBUMIN/GLOB SERPL: 0.7 (ref 1.1–2.2)
ALP SERPL-CCNC: 112 U/L (ref 45–117)
ALT SERPL-CCNC: 21 U/L (ref 12–78)
ANION GAP SERPL CALC-SCNC: 7 MMOL/L (ref 5–15)
APPEARANCE UR: CLEAR
AST SERPL W P-5'-P-CCNC: 22 U/L (ref 15–37)
BACTERIA URNS QL MICRO: ABNORMAL /HPF
BASOPHILS # BLD: 0 K/UL (ref 0–0.1)
BASOPHILS NFR BLD: 0 % (ref 0–1)
BILIRUB SERPL-MCNC: 0.2 MG/DL (ref 0.2–1)
BILIRUB UR QL: NEGATIVE
BUN SERPL-MCNC: 68 MG/DL (ref 6–20)
BUN/CREAT SERPL: 15 (ref 12–20)
CA-I BLD-MCNC: 8.2 MG/DL (ref 8.5–10.1)
CHLORIDE SERPL-SCNC: 117 MMOL/L (ref 97–108)
CO2 SERPL-SCNC: 20 MMOL/L (ref 21–32)
COLLECT DATE STL: ABNORMAL
COLOR UR: ABNORMAL
CREAT SERPL-MCNC: 4.59 MG/DL (ref 0.7–1.3)
DIFFERENTIAL METHOD BLD: ABNORMAL
EOSINOPHIL # BLD: 0.5 K/UL (ref 0–0.4)
EOSINOPHIL NFR BLD: 4 % (ref 0–7)
ERYTHROCYTE [DISTWIDTH] IN BLOOD BY AUTOMATED COUNT: 14 % (ref 11.5–14.5)
GLOBULIN SER CALC-MCNC: 3.6 G/DL (ref 2–4)
GLUCOSE BLD STRIP.AUTO-MCNC: 109 MG/DL (ref 65–100)
GLUCOSE BLD STRIP.AUTO-MCNC: 112 MG/DL (ref 65–100)
GLUCOSE BLD STRIP.AUTO-MCNC: 127 MG/DL (ref 65–100)
GLUCOSE BLD STRIP.AUTO-MCNC: 97 MG/DL (ref 65–100)
GLUCOSE SERPL-MCNC: 98 MG/DL (ref 65–100)
GLUCOSE UR STRIP.AUTO-MCNC: NEGATIVE MG/DL
HCT VFR BLD AUTO: 28.3 % (ref 36.6–50.3)
HEMOCCULT SP1 STL QL: POSITIVE
HGB BLD-MCNC: 9.1 G/DL (ref 12.1–17)
HGB UR QL STRIP: ABNORMAL
IMM GRANULOCYTES # BLD AUTO: 0.1 K/UL (ref 0–0.04)
IMM GRANULOCYTES NFR BLD AUTO: 0 % (ref 0–0.5)
IRON SATN MFR SERPL: 17 % (ref 20–50)
IRON SERPL-MCNC: 47 UG/DL (ref 35–150)
KETONES UR QL STRIP.AUTO: NEGATIVE MG/DL
LEUKOCYTE ESTERASE UR QL STRIP.AUTO: NEGATIVE
LYMPHOCYTES # BLD: 0.6 K/UL (ref 0.8–3.5)
LYMPHOCYTES NFR BLD: 4 % (ref 12–49)
M PNEUMO IGM SER IA-ACNC: NONREACTIVE
MAGNESIUM SERPL-MCNC: 2.2 MG/DL (ref 1.6–2.4)
MCH RBC QN AUTO: 31.9 PG (ref 26–34)
MCHC RBC AUTO-ENTMCNC: 32.2 G/DL (ref 30–36.5)
MCV RBC AUTO: 99.3 FL (ref 80–99)
MONOCYTES # BLD: 0.5 K/UL (ref 0–1)
MONOCYTES NFR BLD: 4 % (ref 5–13)
MUCOUS THREADS URNS QL MICRO: NEGATIVE /LPF
NEUTS SEG # BLD: 11.8 K/UL (ref 1.8–8)
NEUTS SEG NFR BLD: 88 % (ref 32–75)
NITRITE UR QL STRIP.AUTO: NEGATIVE
NRBC # BLD: 0 K/UL (ref 0–0.01)
NRBC BLD-RTO: 0 PER 100 WBC
PERFORMED BY:: ABNORMAL
PERFORMED BY:: NORMAL
PH UR STRIP: 5 (ref 5–8)
PHOSPHATE SERPL-MCNC: 4.9 MG/DL (ref 2.6–4.7)
PLATELET # BLD AUTO: 252 K/UL (ref 150–400)
PMV BLD AUTO: 9.3 FL (ref 8.9–12.9)
POTASSIUM SERPL-SCNC: 4.3 MMOL/L (ref 3.5–5.1)
PROT SERPL-MCNC: 6 G/DL (ref 6.4–8.2)
PROT UR STRIP-MCNC: 100 MG/DL
RBC # BLD AUTO: 2.85 M/UL (ref 4.1–5.7)
RBC #/AREA URNS HPF: ABNORMAL /HPF (ref 0–5)
SODIUM SERPL-SCNC: 144 MMOL/L (ref 136–145)
SP GR UR REFRACTOMETRY: 1.01 (ref 1–1.03)
TIBC SERPL-MCNC: 273 UG/DL (ref 250–450)
UROBILINOGEN UR QL STRIP.AUTO: 0.1 EU/DL (ref 0.1–1)
WBC # BLD AUTO: 13.6 K/UL (ref 4.1–11.1)
WBC URNS QL MICRO: ABNORMAL /HPF (ref 0–4)

## 2024-05-31 PROCEDURE — 85025 COMPLETE CBC W/AUTO DIFF WBC: CPT

## 2024-05-31 PROCEDURE — 6360000002 HC RX W HCPCS: Performed by: PHYSICIAN ASSISTANT

## 2024-05-31 PROCEDURE — 80053 COMPREHEN METABOLIC PANEL: CPT

## 2024-05-31 PROCEDURE — 83735 ASSAY OF MAGNESIUM: CPT

## 2024-05-31 PROCEDURE — 97161 PT EVAL LOW COMPLEX 20 MIN: CPT

## 2024-05-31 PROCEDURE — 97530 THERAPEUTIC ACTIVITIES: CPT

## 2024-05-31 PROCEDURE — 94640 AIRWAY INHALATION TREATMENT: CPT

## 2024-05-31 PROCEDURE — 2700000000 HC OXYGEN THERAPY PER DAY

## 2024-05-31 PROCEDURE — 84100 ASSAY OF PHOSPHORUS: CPT

## 2024-05-31 PROCEDURE — 82962 GLUCOSE BLOOD TEST: CPT

## 2024-05-31 PROCEDURE — 6360000002 HC RX W HCPCS: Performed by: STUDENT IN AN ORGANIZED HEALTH CARE EDUCATION/TRAINING PROGRAM

## 2024-05-31 PROCEDURE — 94761 N-INVAS EAR/PLS OXIMETRY MLT: CPT

## 2024-05-31 PROCEDURE — 97165 OT EVAL LOW COMPLEX 30 MIN: CPT

## 2024-05-31 PROCEDURE — 2580000003 HC RX 258: Performed by: INTERNAL MEDICINE

## 2024-05-31 PROCEDURE — 81001 URINALYSIS AUTO W/SCOPE: CPT

## 2024-05-31 PROCEDURE — 6370000000 HC RX 637 (ALT 250 FOR IP): Performed by: PHYSICIAN ASSISTANT

## 2024-05-31 PROCEDURE — 83540 ASSAY OF IRON: CPT

## 2024-05-31 PROCEDURE — 2580000003 HC RX 258: Performed by: STUDENT IN AN ORGANIZED HEALTH CARE EDUCATION/TRAINING PROGRAM

## 2024-05-31 PROCEDURE — 36415 COLL VENOUS BLD VENIPUNCTURE: CPT

## 2024-05-31 PROCEDURE — 87449 NOS EACH ORGANISM AG IA: CPT

## 2024-05-31 PROCEDURE — 6360000002 HC RX W HCPCS: Performed by: INTERNAL MEDICINE

## 2024-05-31 PROCEDURE — 1100000000 HC RM PRIVATE

## 2024-05-31 PROCEDURE — 82272 OCCULT BLD FECES 1-3 TESTS: CPT

## 2024-05-31 PROCEDURE — 6370000000 HC RX 637 (ALT 250 FOR IP): Performed by: STUDENT IN AN ORGANIZED HEALTH CARE EDUCATION/TRAINING PROGRAM

## 2024-05-31 PROCEDURE — C9113 INJ PANTOPRAZOLE SODIUM, VIA: HCPCS | Performed by: INTERNAL MEDICINE

## 2024-05-31 RX ORDER — HYDRALAZINE HYDROCHLORIDE 20 MG/ML
10 INJECTION INTRAMUSCULAR; INTRAVENOUS EVERY 6 HOURS PRN
Status: DISCONTINUED | OUTPATIENT
Start: 2024-05-31 | End: 2024-06-10 | Stop reason: HOSPADM

## 2024-05-31 RX ORDER — GUAIFENESIN 600 MG/1
600 TABLET, EXTENDED RELEASE ORAL 2 TIMES DAILY
Status: DISCONTINUED | OUTPATIENT
Start: 2024-05-31 | End: 2024-06-10 | Stop reason: HOSPADM

## 2024-05-31 RX ORDER — PANTOPRAZOLE SODIUM 40 MG/10ML
40 INJECTION, POWDER, LYOPHILIZED, FOR SOLUTION INTRAVENOUS 2 TIMES DAILY
Status: DISCONTINUED | OUTPATIENT
Start: 2024-05-31 | End: 2024-06-10 | Stop reason: HOSPADM

## 2024-05-31 RX ORDER — IPRATROPIUM BROMIDE AND ALBUTEROL SULFATE 2.5; .5 MG/3ML; MG/3ML
1 SOLUTION RESPIRATORY (INHALATION)
Status: DISCONTINUED | OUTPATIENT
Start: 2024-05-31 | End: 2024-06-02

## 2024-05-31 RX ORDER — IPRATROPIUM BROMIDE AND ALBUTEROL SULFATE 2.5; .5 MG/3ML; MG/3ML
1 SOLUTION RESPIRATORY (INHALATION)
Status: DISCONTINUED | OUTPATIENT
Start: 2024-05-31 | End: 2024-05-31

## 2024-05-31 RX ORDER — BUDESONIDE AND FORMOTEROL FUMARATE DIHYDRATE 160; 4.5 UG/1; UG/1
2 AEROSOL RESPIRATORY (INHALATION)
Status: DISCONTINUED | OUTPATIENT
Start: 2024-05-31 | End: 2024-06-10 | Stop reason: HOSPADM

## 2024-05-31 RX ORDER — HYDRALAZINE HYDROCHLORIDE 20 MG/ML
10 INJECTION INTRAMUSCULAR; INTRAVENOUS EVERY 6 HOURS PRN
Status: DISCONTINUED | OUTPATIENT
Start: 2024-05-31 | End: 2024-05-31

## 2024-05-31 RX ADMIN — PANTOPRAZOLE SODIUM 40 MG: 40 INJECTION, POWDER, FOR SOLUTION INTRAVENOUS at 20:58

## 2024-05-31 RX ADMIN — VANCOMYCIN HYDROCHLORIDE 125 MG: 125 CAPSULE ORAL at 08:58

## 2024-05-31 RX ADMIN — ONDANSETRON 4 MG: 2 INJECTION INTRAMUSCULAR; INTRAVENOUS at 11:54

## 2024-05-31 RX ADMIN — SODIUM CHLORIDE: 9 INJECTION, SOLUTION INTRAVENOUS at 15:41

## 2024-05-31 RX ADMIN — TAMSULOSIN HYDROCHLORIDE 0.4 MG: 0.4 CAPSULE ORAL at 08:58

## 2024-05-31 RX ADMIN — BUPROPION HYDROCHLORIDE 150 MG: 150 TABLET, FILM COATED, EXTENDED RELEASE ORAL at 08:58

## 2024-05-31 RX ADMIN — AMLODIPINE BESYLATE 10 MG: 5 TABLET ORAL at 08:58

## 2024-05-31 RX ADMIN — LABETALOL HYDROCHLORIDE 10 MG: 5 INJECTION, SOLUTION INTRAVENOUS at 11:10

## 2024-05-31 RX ADMIN — HYDRALAZINE HYDROCHLORIDE 10 MG: 20 INJECTION INTRAMUSCULAR; INTRAVENOUS at 20:51

## 2024-05-31 RX ADMIN — SERTRALINE HYDROCHLORIDE 150 MG: 50 TABLET ORAL at 08:58

## 2024-05-31 RX ADMIN — Medication 2 PUFF: at 20:43

## 2024-05-31 RX ADMIN — LEVOTHYROXINE SODIUM 25 MCG: 0.03 TABLET ORAL at 06:04

## 2024-05-31 RX ADMIN — Medication 1 CAPSULE: at 08:58

## 2024-05-31 RX ADMIN — CLOPIDOGREL BISULFATE 75 MG: 75 TABLET ORAL at 08:57

## 2024-05-31 RX ADMIN — CEFTRIAXONE SODIUM 1000 MG: 1 INJECTION, POWDER, FOR SOLUTION INTRAMUSCULAR; INTRAVENOUS at 08:57

## 2024-05-31 RX ADMIN — SODIUM CHLORIDE, PRESERVATIVE FREE 10 ML: 5 INJECTION INTRAVENOUS at 21:05

## 2024-05-31 RX ADMIN — METOPROLOL TARTRATE 50 MG: 50 TABLET, FILM COATED ORAL at 08:58

## 2024-05-31 RX ADMIN — IPRATROPIUM BROMIDE AND ALBUTEROL SULFATE 1 DOSE: 2.5; .5 SOLUTION RESPIRATORY (INHALATION) at 20:43

## 2024-05-31 RX ADMIN — ASPIRIN 81 MG 81 MG: 81 TABLET ORAL at 08:58

## 2024-05-31 RX ADMIN — HYDRALAZINE HYDROCHLORIDE 10 MG: 20 INJECTION INTRAMUSCULAR; INTRAVENOUS at 12:28

## 2024-05-31 RX ADMIN — AZITHROMYCIN 250 MG: 500 INJECTION, POWDER, LYOPHILIZED, FOR SOLUTION INTRAVENOUS at 11:54

## 2024-05-31 RX ADMIN — SODIUM CHLORIDE, PRESERVATIVE FREE 10 ML: 5 INJECTION INTRAVENOUS at 08:58

## 2024-05-31 RX ADMIN — ATORVASTATIN CALCIUM 40 MG: 40 TABLET, FILM COATED ORAL at 08:58

## 2024-05-31 RX ADMIN — HEPARIN SODIUM 5000 UNITS: 5000 INJECTION INTRAVENOUS; SUBCUTANEOUS at 06:06

## 2024-05-31 RX ADMIN — SODIUM CHLORIDE: 9 INJECTION, SOLUTION INTRAVENOUS at 06:08

## 2024-05-31 RX ADMIN — DONEPEZIL HYDROCHLORIDE 5 MG: 5 TABLET, FILM COATED ORAL at 08:58

## 2024-05-31 RX ADMIN — Medication 2 PUFF: at 10:59

## 2024-05-31 RX ADMIN — IPRATROPIUM BROMIDE AND ALBUTEROL SULFATE 1 DOSE: 2.5; .5 SOLUTION RESPIRATORY (INHALATION) at 10:58

## 2024-05-31 ASSESSMENT — PAIN SCALES - GENERAL
PAINLEVEL_OUTOF10: 0

## 2024-05-31 NOTE — CARE COORDINATION
CM reviewed chart. DCP [facility], SNF,  when medically ready. Auth needed. Therapy evaluations needed to be skilled. FRANK landin continue to follow.

## 2024-06-01 LAB
ALBUMIN SERPL-MCNC: 2.2 G/DL (ref 3.5–5)
ALBUMIN/GLOB SERPL: 0.8 (ref 1.1–2.2)
ALP SERPL-CCNC: 104 U/L (ref 45–117)
ALT SERPL-CCNC: 21 U/L (ref 12–78)
ANION GAP SERPL CALC-SCNC: 7 MMOL/L (ref 5–15)
APPEARANCE UR: CLEAR
AST SERPL W P-5'-P-CCNC: 32 U/L (ref 15–37)
BACTERIA URNS QL MICRO: NEGATIVE /HPF
BASOPHILS # BLD: 0 K/UL (ref 0–0.1)
BASOPHILS NFR BLD: 0 % (ref 0–1)
BILIRUB SERPL-MCNC: 0.3 MG/DL (ref 0.2–1)
BILIRUB UR QL: NEGATIVE
BUN SERPL-MCNC: 62 MG/DL (ref 6–20)
BUN/CREAT SERPL: 15 (ref 12–20)
CA-I BLD-MCNC: 8.1 MG/DL (ref 8.5–10.1)
CHLORIDE SERPL-SCNC: 122 MMOL/L (ref 97–108)
CO2 SERPL-SCNC: 19 MMOL/L (ref 21–32)
COLOR UR: ABNORMAL
CREAT SERPL-MCNC: 4.13 MG/DL (ref 0.7–1.3)
DIFFERENTIAL METHOD BLD: ABNORMAL
EOSINOPHIL # BLD: 0.2 K/UL (ref 0–0.4)
EOSINOPHIL NFR BLD: 2 % (ref 0–7)
EPITH CASTS URNS QL MICRO: ABNORMAL /LPF
ERYTHROCYTE [DISTWIDTH] IN BLOOD BY AUTOMATED COUNT: 14.3 % (ref 11.5–14.5)
FERRITIN SERPL-MCNC: 369 NG/ML (ref 26–388)
GLOBULIN SER CALC-MCNC: 2.9 G/DL (ref 2–4)
GLUCOSE BLD STRIP.AUTO-MCNC: 134 MG/DL (ref 65–100)
GLUCOSE BLD STRIP.AUTO-MCNC: 137 MG/DL (ref 65–100)
GLUCOSE BLD STRIP.AUTO-MCNC: 204 MG/DL (ref 65–100)
GLUCOSE SERPL-MCNC: 108 MG/DL (ref 65–100)
GLUCOSE UR STRIP.AUTO-MCNC: NEGATIVE MG/DL
HCT VFR BLD AUTO: 24.9 % (ref 36.6–50.3)
HGB BLD-MCNC: 8.1 G/DL (ref 12.1–17)
HGB UR QL STRIP: ABNORMAL
IMM GRANULOCYTES # BLD AUTO: 0 K/UL (ref 0–0.04)
IMM GRANULOCYTES NFR BLD AUTO: 0 % (ref 0–0.5)
KETONES UR QL STRIP.AUTO: 5 MG/DL
LEUKOCYTE ESTERASE UR QL STRIP.AUTO: NEGATIVE
LYMPHOCYTES # BLD: 0.5 K/UL (ref 0.8–3.5)
LYMPHOCYTES NFR BLD: 5 % (ref 12–49)
MAGNESIUM SERPL-MCNC: 1.9 MG/DL (ref 1.6–2.4)
MCH RBC QN AUTO: 31.2 PG (ref 26–34)
MCHC RBC AUTO-ENTMCNC: 32.5 G/DL (ref 30–36.5)
MCV RBC AUTO: 95.8 FL (ref 80–99)
MONOCYTES # BLD: 0.5 K/UL (ref 0–1)
MONOCYTES NFR BLD: 5 % (ref 5–13)
MUCOUS THREADS URNS QL MICRO: ABNORMAL /LPF
NEUTS SEG # BLD: 9.1 K/UL (ref 1.8–8)
NEUTS SEG NFR BLD: 88 % (ref 32–75)
NITRITE UR QL STRIP.AUTO: NEGATIVE
NRBC # BLD: 0 K/UL (ref 0–0.01)
NRBC BLD-RTO: 0 PER 100 WBC
PERFORMED BY:: ABNORMAL
PH UR STRIP: 5 (ref 5–8)
PHOSPHATE SERPL-MCNC: 4.1 MG/DL (ref 2.6–4.7)
PLATELET # BLD AUTO: 233 K/UL (ref 150–400)
PMV BLD AUTO: 9.6 FL (ref 8.9–12.9)
POTASSIUM SERPL-SCNC: 4.2 MMOL/L (ref 3.5–5.1)
PROT SERPL-MCNC: 5.1 G/DL (ref 6.4–8.2)
PROT UR STRIP-MCNC: 100 MG/DL
RBC # BLD AUTO: 2.6 M/UL (ref 4.1–5.7)
SODIUM SERPL-SCNC: 148 MMOL/L (ref 136–145)
SP GR UR REFRACTOMETRY: 1.01 (ref 1–1.03)
URINE CULTURE IF INDICATED: ABNORMAL
UROBILINOGEN UR QL STRIP.AUTO: 0.1 EU/DL (ref 0.1–1)
WBC # BLD AUTO: 10.4 K/UL (ref 4.1–11.1)
WBC URNS QL MICRO: ABNORMAL /HPF (ref 0–4)

## 2024-06-01 PROCEDURE — 85025 COMPLETE CBC W/AUTO DIFF WBC: CPT

## 2024-06-01 PROCEDURE — 6370000000 HC RX 637 (ALT 250 FOR IP): Performed by: PHYSICIAN ASSISTANT

## 2024-06-01 PROCEDURE — 2580000003 HC RX 258: Performed by: PHYSICIAN ASSISTANT

## 2024-06-01 PROCEDURE — 83735 ASSAY OF MAGNESIUM: CPT

## 2024-06-01 PROCEDURE — 6370000000 HC RX 637 (ALT 250 FOR IP): Performed by: STUDENT IN AN ORGANIZED HEALTH CARE EDUCATION/TRAINING PROGRAM

## 2024-06-01 PROCEDURE — 6360000002 HC RX W HCPCS: Performed by: INTERNAL MEDICINE

## 2024-06-01 PROCEDURE — 84100 ASSAY OF PHOSPHORUS: CPT

## 2024-06-01 PROCEDURE — 6360000002 HC RX W HCPCS: Performed by: STUDENT IN AN ORGANIZED HEALTH CARE EDUCATION/TRAINING PROGRAM

## 2024-06-01 PROCEDURE — 82728 ASSAY OF FERRITIN: CPT

## 2024-06-01 PROCEDURE — 2580000003 HC RX 258: Performed by: INTERNAL MEDICINE

## 2024-06-01 PROCEDURE — 84439 ASSAY OF FREE THYROXINE: CPT

## 2024-06-01 PROCEDURE — 80053 COMPREHEN METABOLIC PANEL: CPT

## 2024-06-01 PROCEDURE — 92610 EVALUATE SWALLOWING FUNCTION: CPT

## 2024-06-01 PROCEDURE — 1100000000 HC RM PRIVATE

## 2024-06-01 PROCEDURE — C9113 INJ PANTOPRAZOLE SODIUM, VIA: HCPCS | Performed by: INTERNAL MEDICINE

## 2024-06-01 PROCEDURE — 6370000000 HC RX 637 (ALT 250 FOR IP): Performed by: INTERNAL MEDICINE

## 2024-06-01 PROCEDURE — 36415 COLL VENOUS BLD VENIPUNCTURE: CPT

## 2024-06-01 PROCEDURE — 6360000002 HC RX W HCPCS: Performed by: PHYSICIAN ASSISTANT

## 2024-06-01 PROCEDURE — 2700000000 HC OXYGEN THERAPY PER DAY

## 2024-06-01 PROCEDURE — 2580000003 HC RX 258: Performed by: STUDENT IN AN ORGANIZED HEALTH CARE EDUCATION/TRAINING PROGRAM

## 2024-06-01 PROCEDURE — 81001 URINALYSIS AUTO W/SCOPE: CPT

## 2024-06-01 PROCEDURE — 94761 N-INVAS EAR/PLS OXIMETRY MLT: CPT

## 2024-06-01 PROCEDURE — 94640 AIRWAY INHALATION TREATMENT: CPT

## 2024-06-01 PROCEDURE — 82962 GLUCOSE BLOOD TEST: CPT

## 2024-06-01 RX ORDER — SODIUM CHLORIDE 450 MG/100ML
INJECTION, SOLUTION INTRAVENOUS CONTINUOUS
Status: DISCONTINUED | OUTPATIENT
Start: 2024-06-01 | End: 2024-06-01

## 2024-06-01 RX ORDER — PROCHLORPERAZINE EDISYLATE 5 MG/ML
10 INJECTION INTRAMUSCULAR; INTRAVENOUS EVERY 6 HOURS PRN
Status: DISCONTINUED | OUTPATIENT
Start: 2024-06-01 | End: 2024-06-10 | Stop reason: HOSPADM

## 2024-06-01 RX ORDER — DEXTROSE AND SODIUM CHLORIDE 5; .2 G/100ML; G/100ML
INJECTION, SOLUTION INTRAVENOUS CONTINUOUS
Status: DISCONTINUED | OUTPATIENT
Start: 2024-06-01 | End: 2024-06-02

## 2024-06-01 RX ORDER — HYDRALAZINE HYDROCHLORIDE 50 MG/1
25 TABLET, FILM COATED ORAL 3 TIMES DAILY
Status: DISCONTINUED | OUTPATIENT
Start: 2024-06-01 | End: 2024-06-02

## 2024-06-01 RX ORDER — FERROUS SULFATE 325(65) MG
325 TABLET ORAL 2 TIMES DAILY
Status: DISCONTINUED | OUTPATIENT
Start: 2024-06-01 | End: 2024-06-10 | Stop reason: HOSPADM

## 2024-06-01 RX ORDER — HYDRALAZINE HYDROCHLORIDE 50 MG/1
25 TABLET, FILM COATED ORAL EVERY 12 HOURS SCHEDULED
Status: DISCONTINUED | OUTPATIENT
Start: 2024-06-01 | End: 2024-06-01

## 2024-06-01 RX ADMIN — SODIUM CHLORIDE, PRESERVATIVE FREE 10 ML: 5 INJECTION INTRAVENOUS at 11:21

## 2024-06-01 RX ADMIN — HYDRALAZINE HYDROCHLORIDE 25 MG: 50 TABLET ORAL at 21:51

## 2024-06-01 RX ADMIN — DEXTROSE AND SODIUM CHLORIDE: 5; 200 INJECTION, SOLUTION INTRAVENOUS at 14:14

## 2024-06-01 RX ADMIN — HYDRALAZINE HYDROCHLORIDE 10 MG: 20 INJECTION INTRAMUSCULAR; INTRAVENOUS at 08:50

## 2024-06-01 RX ADMIN — AMLODIPINE BESYLATE 10 MG: 5 TABLET ORAL at 08:52

## 2024-06-01 RX ADMIN — BUPROPION HYDROCHLORIDE 150 MG: 150 TABLET, FILM COATED, EXTENDED RELEASE ORAL at 08:52

## 2024-06-01 RX ADMIN — Medication 1 CAPSULE: at 08:52

## 2024-06-01 RX ADMIN — INSULIN LISPRO 2 UNITS: 100 INJECTION, SOLUTION INTRAVENOUS; SUBCUTANEOUS at 17:31

## 2024-06-01 RX ADMIN — PROCHLORPERAZINE EDISYLATE 10 MG: 5 INJECTION INTRAMUSCULAR; INTRAVENOUS at 15:54

## 2024-06-01 RX ADMIN — HYDRALAZINE HYDROCHLORIDE 25 MG: 50 TABLET ORAL at 14:22

## 2024-06-01 RX ADMIN — PANTOPRAZOLE SODIUM 40 MG: 40 INJECTION, POWDER, FOR SOLUTION INTRAVENOUS at 08:52

## 2024-06-01 RX ADMIN — Medication 2 PUFF: at 20:00

## 2024-06-01 RX ADMIN — GUAIFENESIN 600 MG: 600 TABLET, EXTENDED RELEASE ORAL at 21:52

## 2024-06-01 RX ADMIN — Medication 2 PUFF: at 09:04

## 2024-06-01 RX ADMIN — EPOETIN ALFA-EPBX 10000 UNITS: 10000 INJECTION, SOLUTION INTRAVENOUS; SUBCUTANEOUS at 14:16

## 2024-06-01 RX ADMIN — INSULIN GLARGINE 15 UNITS: 100 INJECTION, SOLUTION SUBCUTANEOUS at 21:51

## 2024-06-01 RX ADMIN — TAMSULOSIN HYDROCHLORIDE 0.4 MG: 0.4 CAPSULE ORAL at 08:51

## 2024-06-01 RX ADMIN — FERROUS SULFATE TAB 325 MG (65 MG ELEMENTAL FE) 325 MG: 325 (65 FE) TAB at 12:50

## 2024-06-01 RX ADMIN — SERTRALINE HYDROCHLORIDE 150 MG: 50 TABLET ORAL at 08:51

## 2024-06-01 RX ADMIN — GUAIFENESIN 600 MG: 600 TABLET, EXTENDED RELEASE ORAL at 08:52

## 2024-06-01 RX ADMIN — SODIUM CHLORIDE: 4.5 INJECTION, SOLUTION INTRAVENOUS at 08:40

## 2024-06-01 RX ADMIN — BUPROPION HYDROCHLORIDE 150 MG: 150 TABLET, FILM COATED, EXTENDED RELEASE ORAL at 21:51

## 2024-06-01 RX ADMIN — AZITHROMYCIN 250 MG: 500 INJECTION, POWDER, LYOPHILIZED, FOR SOLUTION INTRAVENOUS at 11:39

## 2024-06-01 RX ADMIN — SODIUM CHLORIDE, PRESERVATIVE FREE 10 ML: 5 INJECTION INTRAVENOUS at 21:52

## 2024-06-01 RX ADMIN — PANTOPRAZOLE SODIUM 40 MG: 40 INJECTION, POWDER, FOR SOLUTION INTRAVENOUS at 21:53

## 2024-06-01 RX ADMIN — IPRATROPIUM BROMIDE AND ALBUTEROL SULFATE 1 DOSE: 2.5; .5 SOLUTION RESPIRATORY (INHALATION) at 13:43

## 2024-06-01 RX ADMIN — METOPROLOL TARTRATE 50 MG: 50 TABLET, FILM COATED ORAL at 08:52

## 2024-06-01 RX ADMIN — CEFTRIAXONE SODIUM 1000 MG: 1 INJECTION, POWDER, FOR SOLUTION INTRAMUSCULAR; INTRAVENOUS at 11:16

## 2024-06-01 RX ADMIN — FERROUS SULFATE TAB 325 MG (65 MG ELEMENTAL FE) 325 MG: 325 (65 FE) TAB at 21:51

## 2024-06-01 RX ADMIN — IPRATROPIUM BROMIDE AND ALBUTEROL SULFATE 1 DOSE: 2.5; .5 SOLUTION RESPIRATORY (INHALATION) at 09:02

## 2024-06-01 RX ADMIN — ATORVASTATIN CALCIUM 40 MG: 40 TABLET, FILM COATED ORAL at 08:51

## 2024-06-01 RX ADMIN — METOPROLOL TARTRATE 50 MG: 50 TABLET, FILM COATED ORAL at 21:51

## 2024-06-01 RX ADMIN — LEVOTHYROXINE SODIUM 25 MCG: 0.03 TABLET ORAL at 06:04

## 2024-06-01 RX ADMIN — HYDRALAZINE HYDROCHLORIDE 25 MG: 50 TABLET ORAL at 08:51

## 2024-06-01 RX ADMIN — IPRATROPIUM BROMIDE AND ALBUTEROL SULFATE 1 DOSE: 2.5; .5 SOLUTION RESPIRATORY (INHALATION) at 20:00

## 2024-06-01 RX ADMIN — DONEPEZIL HYDROCHLORIDE 5 MG: 5 TABLET, FILM COATED ORAL at 08:52

## 2024-06-01 ASSESSMENT — PAIN SCALES - GENERAL: PAINLEVEL_OUTOF10: 0

## 2024-06-02 ENCOUNTER — APPOINTMENT (OUTPATIENT)
Facility: HOSPITAL | Age: 64
DRG: 177 | End: 2024-06-02
Payer: MEDICARE

## 2024-06-02 LAB
ALBUMIN SERPL-MCNC: 2.2 G/DL (ref 3.5–5)
ANION GAP SERPL CALC-SCNC: 8 MMOL/L (ref 5–15)
BASOPHILS # BLD: 0 K/UL (ref 0–0.1)
BASOPHILS NFR BLD: 0 % (ref 0–1)
BUN SERPL-MCNC: 50 MG/DL (ref 6–20)
BUN/CREAT SERPL: 13 (ref 12–20)
CA-I BLD-MCNC: 8.2 MG/DL (ref 8.5–10.1)
CHLORIDE SERPL-SCNC: 114 MMOL/L (ref 97–108)
CO2 SERPL-SCNC: 18 MMOL/L (ref 21–32)
CREAT SERPL-MCNC: 3.8 MG/DL (ref 0.7–1.3)
DIFFERENTIAL METHOD BLD: ABNORMAL
EOSINOPHIL # BLD: 0.3 K/UL (ref 0–0.4)
EOSINOPHIL NFR BLD: 3 % (ref 0–7)
ERYTHROCYTE [DISTWIDTH] IN BLOOD BY AUTOMATED COUNT: 14 % (ref 11.5–14.5)
GLUCOSE BLD STRIP.AUTO-MCNC: 105 MG/DL (ref 65–100)
GLUCOSE BLD STRIP.AUTO-MCNC: 132 MG/DL (ref 65–100)
GLUCOSE BLD STRIP.AUTO-MCNC: 138 MG/DL (ref 65–100)
GLUCOSE BLD STRIP.AUTO-MCNC: 142 MG/DL (ref 65–100)
GLUCOSE SERPL-MCNC: 110 MG/DL (ref 65–100)
HCT VFR BLD AUTO: 24.8 % (ref 36.6–50.3)
HGB BLD-MCNC: 8.2 G/DL (ref 12.1–17)
IMM GRANULOCYTES # BLD AUTO: 0.1 K/UL (ref 0–0.04)
IMM GRANULOCYTES NFR BLD AUTO: 1 % (ref 0–0.5)
LYMPHOCYTES # BLD: 0.5 K/UL (ref 0.8–3.5)
LYMPHOCYTES NFR BLD: 5 % (ref 12–49)
MCH RBC QN AUTO: 31.1 PG (ref 26–34)
MCHC RBC AUTO-ENTMCNC: 33.1 G/DL (ref 30–36.5)
MCV RBC AUTO: 93.9 FL (ref 80–99)
MONOCYTES # BLD: 0.6 K/UL (ref 0–1)
MONOCYTES NFR BLD: 5 % (ref 5–13)
NEUTS SEG # BLD: 9.3 K/UL (ref 1.8–8)
NEUTS SEG NFR BLD: 86 % (ref 32–75)
NRBC # BLD: 0 K/UL (ref 0–0.01)
NRBC BLD-RTO: 0 PER 100 WBC
PERFORMED BY:: ABNORMAL
PHOSPHATE SERPL-MCNC: 3 MG/DL (ref 2.6–4.7)
PLATELET # BLD AUTO: 203 K/UL (ref 150–400)
PMV BLD AUTO: 9.7 FL (ref 8.9–12.9)
POTASSIUM SERPL-SCNC: 3.4 MMOL/L (ref 3.5–5.1)
RBC # BLD AUTO: 2.64 M/UL (ref 4.1–5.7)
SODIUM SERPL-SCNC: 140 MMOL/L (ref 136–145)
T4 FREE SERPL-MCNC: 0.8 NG/DL (ref 0.8–1.5)
WBC # BLD AUTO: 10.7 K/UL (ref 4.1–11.1)

## 2024-06-02 PROCEDURE — 85025 COMPLETE CBC W/AUTO DIFF WBC: CPT

## 2024-06-02 PROCEDURE — 6370000000 HC RX 637 (ALT 250 FOR IP): Performed by: STUDENT IN AN ORGANIZED HEALTH CARE EDUCATION/TRAINING PROGRAM

## 2024-06-02 PROCEDURE — 6370000000 HC RX 637 (ALT 250 FOR IP): Performed by: INTERNAL MEDICINE

## 2024-06-02 PROCEDURE — 6370000000 HC RX 637 (ALT 250 FOR IP): Performed by: PHYSICIAN ASSISTANT

## 2024-06-02 PROCEDURE — 82962 GLUCOSE BLOOD TEST: CPT

## 2024-06-02 PROCEDURE — 80069 RENAL FUNCTION PANEL: CPT

## 2024-06-02 PROCEDURE — 2500000003 HC RX 250 WO HCPCS: Performed by: INTERNAL MEDICINE

## 2024-06-02 PROCEDURE — 36415 COLL VENOUS BLD VENIPUNCTURE: CPT

## 2024-06-02 PROCEDURE — 6360000002 HC RX W HCPCS: Performed by: INTERNAL MEDICINE

## 2024-06-02 PROCEDURE — 6360000002 HC RX W HCPCS: Performed by: STUDENT IN AN ORGANIZED HEALTH CARE EDUCATION/TRAINING PROGRAM

## 2024-06-02 PROCEDURE — 1100000000 HC RM PRIVATE

## 2024-06-02 PROCEDURE — 2580000003 HC RX 258: Performed by: STUDENT IN AN ORGANIZED HEALTH CARE EDUCATION/TRAINING PROGRAM

## 2024-06-02 PROCEDURE — 94640 AIRWAY INHALATION TREATMENT: CPT

## 2024-06-02 PROCEDURE — C9113 INJ PANTOPRAZOLE SODIUM, VIA: HCPCS | Performed by: INTERNAL MEDICINE

## 2024-06-02 PROCEDURE — 2580000003 HC RX 258: Performed by: INTERNAL MEDICINE

## 2024-06-02 PROCEDURE — 71045 X-RAY EXAM CHEST 1 VIEW: CPT

## 2024-06-02 RX ORDER — POTASSIUM CHLORIDE 20 MEQ/1
40 TABLET, EXTENDED RELEASE ORAL ONCE
Status: COMPLETED | OUTPATIENT
Start: 2024-06-02 | End: 2024-06-02

## 2024-06-02 RX ORDER — HYDRALAZINE HYDROCHLORIDE 50 MG/1
50 TABLET, FILM COATED ORAL 3 TIMES DAILY
Status: DISCONTINUED | OUTPATIENT
Start: 2024-06-02 | End: 2024-06-04

## 2024-06-02 RX ORDER — IPRATROPIUM BROMIDE AND ALBUTEROL SULFATE 2.5; .5 MG/3ML; MG/3ML
1 SOLUTION RESPIRATORY (INHALATION) EVERY 4 HOURS PRN
Status: DISCONTINUED | OUTPATIENT
Start: 2024-06-02 | End: 2024-06-10 | Stop reason: HOSPADM

## 2024-06-02 RX ADMIN — HYDRALAZINE HYDROCHLORIDE 50 MG: 50 TABLET ORAL at 15:20

## 2024-06-02 RX ADMIN — DONEPEZIL HYDROCHLORIDE 5 MG: 5 TABLET, FILM COATED ORAL at 08:40

## 2024-06-02 RX ADMIN — PANTOPRAZOLE SODIUM 40 MG: 40 INJECTION, POWDER, FOR SOLUTION INTRAVENOUS at 08:40

## 2024-06-02 RX ADMIN — BUPROPION HYDROCHLORIDE 150 MG: 150 TABLET, FILM COATED, EXTENDED RELEASE ORAL at 08:40

## 2024-06-02 RX ADMIN — INSULIN GLARGINE 15 UNITS: 100 INJECTION, SOLUTION SUBCUTANEOUS at 21:32

## 2024-06-02 RX ADMIN — ATORVASTATIN CALCIUM 40 MG: 40 TABLET, FILM COATED ORAL at 08:40

## 2024-06-02 RX ADMIN — LEVOTHYROXINE SODIUM 25 MCG: 0.03 TABLET ORAL at 06:17

## 2024-06-02 RX ADMIN — SODIUM CHLORIDE, PRESERVATIVE FREE 10 ML: 5 INJECTION INTRAVENOUS at 21:33

## 2024-06-02 RX ADMIN — BUPROPION HYDROCHLORIDE 150 MG: 150 TABLET, FILM COATED, EXTENDED RELEASE ORAL at 21:32

## 2024-06-02 RX ADMIN — PANTOPRAZOLE SODIUM 40 MG: 40 INJECTION, POWDER, FOR SOLUTION INTRAVENOUS at 21:31

## 2024-06-02 RX ADMIN — CEFTRIAXONE SODIUM 1000 MG: 1 INJECTION, POWDER, FOR SOLUTION INTRAMUSCULAR; INTRAVENOUS at 11:27

## 2024-06-02 RX ADMIN — DEXTROSE AND SODIUM CHLORIDE: 5; 200 INJECTION, SOLUTION INTRAVENOUS at 01:50

## 2024-06-02 RX ADMIN — HYDRALAZINE HYDROCHLORIDE 50 MG: 50 TABLET ORAL at 21:32

## 2024-06-02 RX ADMIN — GUAIFENESIN 600 MG: 600 TABLET, EXTENDED RELEASE ORAL at 21:32

## 2024-06-02 RX ADMIN — GUAIFENESIN 600 MG: 600 TABLET, EXTENDED RELEASE ORAL at 08:40

## 2024-06-02 RX ADMIN — SERTRALINE HYDROCHLORIDE 150 MG: 50 TABLET ORAL at 08:40

## 2024-06-02 RX ADMIN — HYDRALAZINE HYDROCHLORIDE 25 MG: 50 TABLET ORAL at 08:40

## 2024-06-02 RX ADMIN — METOPROLOL TARTRATE 50 MG: 50 TABLET, FILM COATED ORAL at 21:32

## 2024-06-02 RX ADMIN — Medication 1 CAPSULE: at 08:40

## 2024-06-02 RX ADMIN — AZITHROMYCIN 250 MG: 500 INJECTION, POWDER, LYOPHILIZED, FOR SOLUTION INTRAVENOUS at 12:19

## 2024-06-02 RX ADMIN — SODIUM CHLORIDE, PRESERVATIVE FREE 10 ML: 5 INJECTION INTRAVENOUS at 08:47

## 2024-06-02 RX ADMIN — FERROUS SULFATE TAB 325 MG (65 MG ELEMENTAL FE) 325 MG: 325 (65 FE) TAB at 21:32

## 2024-06-02 RX ADMIN — SODIUM BICARBONATE: 84 INJECTION, SOLUTION INTRAVENOUS at 13:42

## 2024-06-02 RX ADMIN — Medication 2 PUFF: at 19:18

## 2024-06-02 RX ADMIN — POTASSIUM CHLORIDE 40 MEQ: 1500 TABLET, EXTENDED RELEASE ORAL at 13:12

## 2024-06-02 RX ADMIN — METOPROLOL TARTRATE 50 MG: 50 TABLET, FILM COATED ORAL at 08:40

## 2024-06-02 RX ADMIN — FERROUS SULFATE TAB 325 MG (65 MG ELEMENTAL FE) 325 MG: 325 (65 FE) TAB at 08:40

## 2024-06-02 RX ADMIN — Medication 2 PUFF: at 08:14

## 2024-06-02 RX ADMIN — TAMSULOSIN HYDROCHLORIDE 0.4 MG: 0.4 CAPSULE ORAL at 08:40

## 2024-06-02 RX ADMIN — AMLODIPINE BESYLATE 10 MG: 5 TABLET ORAL at 08:40

## 2024-06-02 RX ADMIN — IPRATROPIUM BROMIDE AND ALBUTEROL SULFATE 1 DOSE: 2.5; .5 SOLUTION RESPIRATORY (INHALATION) at 08:14

## 2024-06-02 ASSESSMENT — PAIN SCALES - GENERAL
PAINLEVEL_OUTOF10: 0
PAINLEVEL_OUTOF10: 0

## 2024-06-03 LAB
ALBUMIN SERPL-MCNC: 2.1 G/DL (ref 3.5–5)
ALBUMIN/GLOB SERPL: 0.6 (ref 1.1–2.2)
ALP SERPL-CCNC: 112 U/L (ref 45–117)
ALT SERPL-CCNC: 33 U/L (ref 12–78)
ANION GAP SERPL CALC-SCNC: 7 MMOL/L (ref 5–15)
AST SERPL W P-5'-P-CCNC: 59 U/L (ref 15–37)
BASOPHILS # BLD: 0 K/UL (ref 0–0.1)
BASOPHILS NFR BLD: 0 % (ref 0–1)
BILIRUB SERPL-MCNC: 0.3 MG/DL (ref 0.2–1)
BUN SERPL-MCNC: 41 MG/DL (ref 6–20)
BUN/CREAT SERPL: 11 (ref 12–20)
CA-I BLD-MCNC: 8.1 MG/DL (ref 8.5–10.1)
CHLORIDE SERPL-SCNC: 109 MMOL/L (ref 97–108)
CO2 SERPL-SCNC: 19 MMOL/L (ref 21–32)
CREAT SERPL-MCNC: 3.58 MG/DL (ref 0.7–1.3)
DIFFERENTIAL METHOD BLD: ABNORMAL
EOSINOPHIL # BLD: 0.3 K/UL (ref 0–0.4)
EOSINOPHIL NFR BLD: 3 % (ref 0–7)
ERYTHROCYTE [DISTWIDTH] IN BLOOD BY AUTOMATED COUNT: 13.6 % (ref 11.5–14.5)
GLOBULIN SER CALC-MCNC: 3.6 G/DL (ref 2–4)
GLUCOSE BLD STRIP.AUTO-MCNC: 152 MG/DL (ref 65–100)
GLUCOSE BLD STRIP.AUTO-MCNC: 43 MG/DL (ref 65–100)
GLUCOSE BLD STRIP.AUTO-MCNC: 44 MG/DL (ref 65–100)
GLUCOSE BLD STRIP.AUTO-MCNC: 44 MG/DL (ref 65–100)
GLUCOSE BLD STRIP.AUTO-MCNC: 65 MG/DL (ref 65–100)
GLUCOSE BLD STRIP.AUTO-MCNC: 90 MG/DL (ref 65–100)
GLUCOSE SERPL-MCNC: 123 MG/DL (ref 65–100)
HCT VFR BLD AUTO: 27.8 % (ref 36.6–50.3)
HGB BLD-MCNC: 9.2 G/DL (ref 12.1–17)
IMM GRANULOCYTES # BLD AUTO: 0.1 K/UL (ref 0–0.04)
IMM GRANULOCYTES NFR BLD AUTO: 1 % (ref 0–0.5)
L PNEUMO1 AG UR QL IA: NEGATIVE
LYMPHOCYTES # BLD: 0.4 K/UL (ref 0.8–3.5)
LYMPHOCYTES NFR BLD: 4 % (ref 12–49)
MCH RBC QN AUTO: 31.3 PG (ref 26–34)
MCHC RBC AUTO-ENTMCNC: 33.1 G/DL (ref 30–36.5)
MCV RBC AUTO: 94.6 FL (ref 80–99)
MONOCYTES # BLD: 0.5 K/UL (ref 0–1)
MONOCYTES NFR BLD: 4 % (ref 5–13)
NEUTS SEG # BLD: 9.2 K/UL (ref 1.8–8)
NEUTS SEG NFR BLD: 88 % (ref 32–75)
NRBC # BLD: 0 K/UL (ref 0–0.01)
NRBC BLD-RTO: 0 PER 100 WBC
PERFORMED BY:: ABNORMAL
PERFORMED BY:: NORMAL
PERFORMED BY:: NORMAL
PHOSPHATE SERPL-MCNC: 3 MG/DL (ref 2.6–4.7)
PLATELET # BLD AUTO: 170 K/UL (ref 150–400)
PMV BLD AUTO: 9.9 FL (ref 8.9–12.9)
POTASSIUM SERPL-SCNC: 3.5 MMOL/L (ref 3.5–5.1)
PROT SERPL-MCNC: 5.7 G/DL (ref 6.4–8.2)
RBC # BLD AUTO: 2.94 M/UL (ref 4.1–5.7)
SODIUM SERPL-SCNC: 135 MMOL/L (ref 136–145)
WBC # BLD AUTO: 10.5 K/UL (ref 4.1–11.1)

## 2024-06-03 PROCEDURE — 1100000000 HC RM PRIVATE

## 2024-06-03 PROCEDURE — 6370000000 HC RX 637 (ALT 250 FOR IP): Performed by: PHYSICIAN ASSISTANT

## 2024-06-03 PROCEDURE — 6360000002 HC RX W HCPCS: Performed by: INTERNAL MEDICINE

## 2024-06-03 PROCEDURE — 82962 GLUCOSE BLOOD TEST: CPT

## 2024-06-03 PROCEDURE — 6370000000 HC RX 637 (ALT 250 FOR IP): Performed by: INTERNAL MEDICINE

## 2024-06-03 PROCEDURE — 2580000003 HC RX 258: Performed by: PHYSICIAN ASSISTANT

## 2024-06-03 PROCEDURE — C9113 INJ PANTOPRAZOLE SODIUM, VIA: HCPCS | Performed by: INTERNAL MEDICINE

## 2024-06-03 PROCEDURE — 80053 COMPREHEN METABOLIC PANEL: CPT

## 2024-06-03 PROCEDURE — 3600007501: Performed by: INTERNAL MEDICINE

## 2024-06-03 PROCEDURE — 94640 AIRWAY INHALATION TREATMENT: CPT

## 2024-06-03 PROCEDURE — 84100 ASSAY OF PHOSPHORUS: CPT

## 2024-06-03 PROCEDURE — 6360000002 HC RX W HCPCS: Performed by: PHYSICIAN ASSISTANT

## 2024-06-03 PROCEDURE — 2500000003 HC RX 250 WO HCPCS: Performed by: INTERNAL MEDICINE

## 2024-06-03 PROCEDURE — 2580000003 HC RX 258: Performed by: STUDENT IN AN ORGANIZED HEALTH CARE EDUCATION/TRAINING PROGRAM

## 2024-06-03 PROCEDURE — 6370000000 HC RX 637 (ALT 250 FOR IP): Performed by: STUDENT IN AN ORGANIZED HEALTH CARE EDUCATION/TRAINING PROGRAM

## 2024-06-03 PROCEDURE — 2580000003 HC RX 258: Performed by: INTERNAL MEDICINE

## 2024-06-03 PROCEDURE — 36415 COLL VENOUS BLD VENIPUNCTURE: CPT

## 2024-06-03 PROCEDURE — 85025 COMPLETE CBC W/AUTO DIFF WBC: CPT

## 2024-06-03 RX ORDER — DEXTROSE MONOHYDRATE 100 MG/ML
INJECTION, SOLUTION INTRAVENOUS ONCE
Status: DISCONTINUED | OUTPATIENT
Start: 2024-06-03 | End: 2024-06-03

## 2024-06-03 RX ADMIN — HYDRALAZINE HYDROCHLORIDE 50 MG: 50 TABLET ORAL at 14:47

## 2024-06-03 RX ADMIN — METOPROLOL TARTRATE 50 MG: 50 TABLET, FILM COATED ORAL at 21:35

## 2024-06-03 RX ADMIN — HYDRALAZINE HYDROCHLORIDE 50 MG: 50 TABLET ORAL at 08:08

## 2024-06-03 RX ADMIN — DEXTROSE MONOHYDRATE: 100 INJECTION, SOLUTION INTRAVENOUS at 10:40

## 2024-06-03 RX ADMIN — Medication 1 CAPSULE: at 08:08

## 2024-06-03 RX ADMIN — METOPROLOL TARTRATE 50 MG: 50 TABLET, FILM COATED ORAL at 08:08

## 2024-06-03 RX ADMIN — GUAIFENESIN 600 MG: 600 TABLET, EXTENDED RELEASE ORAL at 08:08

## 2024-06-03 RX ADMIN — PANTOPRAZOLE SODIUM 40 MG: 40 INJECTION, POWDER, FOR SOLUTION INTRAVENOUS at 08:11

## 2024-06-03 RX ADMIN — SODIUM CHLORIDE, PRESERVATIVE FREE 10 ML: 5 INJECTION INTRAVENOUS at 21:37

## 2024-06-03 RX ADMIN — SODIUM CHLORIDE, PRESERVATIVE FREE 10 ML: 5 INJECTION INTRAVENOUS at 08:15

## 2024-06-03 RX ADMIN — TAMSULOSIN HYDROCHLORIDE 0.4 MG: 0.4 CAPSULE ORAL at 08:08

## 2024-06-03 RX ADMIN — GUAIFENESIN 600 MG: 600 TABLET, EXTENDED RELEASE ORAL at 21:35

## 2024-06-03 RX ADMIN — Medication 2 PUFF: at 19:53

## 2024-06-03 RX ADMIN — SODIUM BICARBONATE: 84 INJECTION, SOLUTION INTRAVENOUS at 01:40

## 2024-06-03 RX ADMIN — ATORVASTATIN CALCIUM 40 MG: 40 TABLET, FILM COATED ORAL at 08:08

## 2024-06-03 RX ADMIN — CEFTRIAXONE SODIUM 1000 MG: 1 INJECTION, POWDER, FOR SOLUTION INTRAMUSCULAR; INTRAVENOUS at 11:03

## 2024-06-03 RX ADMIN — BUPROPION HYDROCHLORIDE 150 MG: 150 TABLET, FILM COATED, EXTENDED RELEASE ORAL at 21:35

## 2024-06-03 RX ADMIN — PANTOPRAZOLE SODIUM 40 MG: 40 INJECTION, POWDER, FOR SOLUTION INTRAVENOUS at 21:35

## 2024-06-03 RX ADMIN — Medication 2 PUFF: at 07:33

## 2024-06-03 RX ADMIN — FERROUS SULFATE TAB 325 MG (65 MG ELEMENTAL FE) 325 MG: 325 (65 FE) TAB at 21:35

## 2024-06-03 RX ADMIN — SERTRALINE HYDROCHLORIDE 150 MG: 50 TABLET ORAL at 08:07

## 2024-06-03 RX ADMIN — SODIUM BICARBONATE: 84 INJECTION, SOLUTION INTRAVENOUS at 16:33

## 2024-06-03 RX ADMIN — LEVOTHYROXINE SODIUM 25 MCG: 0.03 TABLET ORAL at 08:08

## 2024-06-03 RX ADMIN — BUPROPION HYDROCHLORIDE 150 MG: 150 TABLET, FILM COATED, EXTENDED RELEASE ORAL at 08:08

## 2024-06-03 RX ADMIN — AMLODIPINE BESYLATE 10 MG: 5 TABLET ORAL at 08:08

## 2024-06-03 RX ADMIN — DONEPEZIL HYDROCHLORIDE 5 MG: 5 TABLET, FILM COATED ORAL at 08:08

## 2024-06-03 RX ADMIN — HYDRALAZINE HYDROCHLORIDE 50 MG: 50 TABLET ORAL at 21:35

## 2024-06-03 RX ADMIN — AZITHROMYCIN 250 MG: 500 INJECTION, POWDER, LYOPHILIZED, FOR SOLUTION INTRAVENOUS at 11:18

## 2024-06-03 RX ADMIN — FERROUS SULFATE TAB 325 MG (65 MG ELEMENTAL FE) 325 MG: 325 (65 FE) TAB at 08:08

## 2024-06-03 ASSESSMENT — PAIN SCALES - GENERAL: PAINLEVEL_OUTOF10: 0

## 2024-06-03 NOTE — CARE COORDINATION
1554: CM met with self proclaimed patient friends, Janine ([phone]) and Rocky ([phone]), at bedside. Janine inquired about guardianship and wanting him placed at [facility].     1425: [employer] started.     0922: CM reviewed chart. [facility] when medically ready. Therapy notes will be needed to start auth. CM will continue to follow.

## 2024-06-04 LAB
ALBUMIN SERPL-MCNC: 2.1 G/DL (ref 3.5–5)
ALBUMIN SERPL-MCNC: 2.2 G/DL (ref 3.5–5)
ALBUMIN/GLOB SERPL: 0.6 (ref 1.1–2.2)
ALP SERPL-CCNC: 122 U/L (ref 45–117)
ALT SERPL-CCNC: 35 U/L (ref 12–78)
ANION GAP SERPL CALC-SCNC: 9 MMOL/L (ref 5–15)
ANION GAP SERPL CALC-SCNC: 9 MMOL/L (ref 5–15)
AST SERPL W P-5'-P-CCNC: 54 U/L (ref 15–37)
BASOPHILS # BLD: 0 K/UL (ref 0–0.1)
BASOPHILS NFR BLD: 0 % (ref 0–1)
BILIRUB SERPL-MCNC: 0.3 MG/DL (ref 0.2–1)
BUN SERPL-MCNC: 37 MG/DL (ref 6–20)
BUN SERPL-MCNC: 38 MG/DL (ref 6–20)
BUN/CREAT SERPL: 11 (ref 12–20)
BUN/CREAT SERPL: 11 (ref 12–20)
CA-I BLD-MCNC: 8.1 MG/DL (ref 8.5–10.1)
CA-I BLD-MCNC: 8.1 MG/DL (ref 8.5–10.1)
CHLORIDE SERPL-SCNC: 105 MMOL/L (ref 97–108)
CHLORIDE SERPL-SCNC: 106 MMOL/L (ref 97–108)
CO2 SERPL-SCNC: 20 MMOL/L (ref 21–32)
CO2 SERPL-SCNC: 21 MMOL/L (ref 21–32)
CREAT SERPL-MCNC: 3.33 MG/DL (ref 0.7–1.3)
CREAT SERPL-MCNC: 3.36 MG/DL (ref 0.7–1.3)
DIFFERENTIAL METHOD BLD: ABNORMAL
EOSINOPHIL # BLD: 0.3 K/UL (ref 0–0.4)
EOSINOPHIL NFR BLD: 2 % (ref 0–7)
ERYTHROCYTE [DISTWIDTH] IN BLOOD BY AUTOMATED COUNT: 13.4 % (ref 11.5–14.5)
GLOBULIN SER CALC-MCNC: 3.5 G/DL (ref 2–4)
GLUCOSE BLD STRIP.AUTO-MCNC: 114 MG/DL (ref 65–100)
GLUCOSE BLD STRIP.AUTO-MCNC: 120 MG/DL (ref 65–100)
GLUCOSE BLD STRIP.AUTO-MCNC: 55 MG/DL (ref 65–100)
GLUCOSE BLD STRIP.AUTO-MCNC: 79 MG/DL (ref 65–100)
GLUCOSE BLD STRIP.AUTO-MCNC: 91 MG/DL (ref 65–100)
GLUCOSE SERPL-MCNC: 45 MG/DL (ref 65–100)
GLUCOSE SERPL-MCNC: 45 MG/DL (ref 65–100)
HCT VFR BLD AUTO: 26.9 % (ref 36.6–50.3)
HGB BLD-MCNC: 9.1 G/DL (ref 12.1–17)
IMM GRANULOCYTES # BLD AUTO: 0.1 K/UL (ref 0–0.04)
IMM GRANULOCYTES NFR BLD AUTO: 1 % (ref 0–0.5)
LYMPHOCYTES # BLD: 0.5 K/UL (ref 0.8–3.5)
LYMPHOCYTES NFR BLD: 4 % (ref 12–49)
MCH RBC QN AUTO: 31.2 PG (ref 26–34)
MCHC RBC AUTO-ENTMCNC: 33.8 G/DL (ref 30–36.5)
MCV RBC AUTO: 92.1 FL (ref 80–99)
MONOCYTES # BLD: 0.6 K/UL (ref 0–1)
MONOCYTES NFR BLD: 5 % (ref 5–13)
NEUTS SEG # BLD: 11.7 K/UL (ref 1.8–8)
NEUTS SEG NFR BLD: 88 % (ref 32–75)
NRBC # BLD: 0 K/UL (ref 0–0.01)
NRBC BLD-RTO: 0 PER 100 WBC
PERFORMED BY:: ABNORMAL
PERFORMED BY:: NORMAL
PERFORMED BY:: NORMAL
PHOSPHATE SERPL-MCNC: 2.8 MG/DL (ref 2.6–4.7)
PLATELET # BLD AUTO: 201 K/UL (ref 150–400)
PMV BLD AUTO: 10.5 FL (ref 8.9–12.9)
POTASSIUM SERPL-SCNC: 3.5 MMOL/L (ref 3.5–5.1)
POTASSIUM SERPL-SCNC: 3.5 MMOL/L (ref 3.5–5.1)
PROT SERPL-MCNC: 5.7 G/DL (ref 6.4–8.2)
RBC # BLD AUTO: 2.92 M/UL (ref 4.1–5.7)
SODIUM SERPL-SCNC: 135 MMOL/L (ref 136–145)
SODIUM SERPL-SCNC: 135 MMOL/L (ref 136–145)
WBC # BLD AUTO: 13.1 K/UL (ref 4.1–11.1)

## 2024-06-04 PROCEDURE — 97530 THERAPEUTIC ACTIVITIES: CPT

## 2024-06-04 PROCEDURE — 80053 COMPREHEN METABOLIC PANEL: CPT

## 2024-06-04 PROCEDURE — 80069 RENAL FUNCTION PANEL: CPT

## 2024-06-04 PROCEDURE — 6370000000 HC RX 637 (ALT 250 FOR IP): Performed by: PHYSICIAN ASSISTANT

## 2024-06-04 PROCEDURE — 94761 N-INVAS EAR/PLS OXIMETRY MLT: CPT

## 2024-06-04 PROCEDURE — 82962 GLUCOSE BLOOD TEST: CPT

## 2024-06-04 PROCEDURE — B54NZZA ULTRASONOGRAPHY OF LEFT UPPER EXTREMITY VEINS, GUIDANCE: ICD-10-PCS | Performed by: INTERNAL MEDICINE

## 2024-06-04 PROCEDURE — 85025 COMPLETE CBC W/AUTO DIFF WBC: CPT

## 2024-06-04 PROCEDURE — 6370000000 HC RX 637 (ALT 250 FOR IP): Performed by: INTERNAL MEDICINE

## 2024-06-04 PROCEDURE — 2580000003 HC RX 258: Performed by: STUDENT IN AN ORGANIZED HEALTH CARE EDUCATION/TRAINING PROGRAM

## 2024-06-04 PROCEDURE — 05HA33Z INSERTION OF INFUSION DEVICE INTO LEFT BRACHIAL VEIN, PERCUTANEOUS APPROACH: ICD-10-PCS | Performed by: INTERNAL MEDICINE

## 2024-06-04 PROCEDURE — 6370000000 HC RX 637 (ALT 250 FOR IP): Performed by: PSYCHIATRY & NEUROLOGY

## 2024-06-04 PROCEDURE — 6370000000 HC RX 637 (ALT 250 FOR IP): Performed by: STUDENT IN AN ORGANIZED HEALTH CARE EDUCATION/TRAINING PROGRAM

## 2024-06-04 PROCEDURE — 1100000000 HC RM PRIVATE

## 2024-06-04 PROCEDURE — 6360000002 HC RX W HCPCS: Performed by: STUDENT IN AN ORGANIZED HEALTH CARE EDUCATION/TRAINING PROGRAM

## 2024-06-04 PROCEDURE — 36410 VNPNXR 3YR/> PHY/QHP DX/THER: CPT

## 2024-06-04 PROCEDURE — 94640 AIRWAY INHALATION TREATMENT: CPT

## 2024-06-04 PROCEDURE — 36415 COLL VENOUS BLD VENIPUNCTURE: CPT

## 2024-06-04 PROCEDURE — C9113 INJ PANTOPRAZOLE SODIUM, VIA: HCPCS | Performed by: INTERNAL MEDICINE

## 2024-06-04 PROCEDURE — 2580000003 HC RX 258: Performed by: INTERNAL MEDICINE

## 2024-06-04 PROCEDURE — 6360000002 HC RX W HCPCS: Performed by: INTERNAL MEDICINE

## 2024-06-04 RX ORDER — MIRTAZAPINE 15 MG/1
7.5 TABLET, ORALLY DISINTEGRATING ORAL NIGHTLY
Status: DISCONTINUED | OUTPATIENT
Start: 2024-06-04 | End: 2024-06-10 | Stop reason: HOSPADM

## 2024-06-04 RX ORDER — HYDRALAZINE HYDROCHLORIDE 50 MG/1
100 TABLET, FILM COATED ORAL 3 TIMES DAILY
Status: DISCONTINUED | OUTPATIENT
Start: 2024-06-05 | End: 2024-06-10 | Stop reason: HOSPADM

## 2024-06-04 RX ORDER — FUROSEMIDE 10 MG/ML
40 INJECTION INTRAMUSCULAR; INTRAVENOUS ONCE
Status: DISCONTINUED | OUTPATIENT
Start: 2024-06-04 | End: 2024-06-10 | Stop reason: HOSPADM

## 2024-06-04 RX ORDER — DEXTROSE MONOHYDRATE 100 MG/ML
INJECTION, SOLUTION INTRAVENOUS CONTINUOUS
Status: DISCONTINUED | OUTPATIENT
Start: 2024-06-04 | End: 2024-06-05

## 2024-06-04 RX ORDER — SODIUM BICARBONATE 650 MG/1
650 TABLET ORAL
Status: DISCONTINUED | OUTPATIENT
Start: 2024-06-05 | End: 2024-06-10 | Stop reason: HOSPADM

## 2024-06-04 RX ADMIN — Medication 2 PUFF: at 07:45

## 2024-06-04 RX ADMIN — DONEPEZIL HYDROCHLORIDE 5 MG: 5 TABLET, FILM COATED ORAL at 09:56

## 2024-06-04 RX ADMIN — PANTOPRAZOLE SODIUM 40 MG: 40 INJECTION, POWDER, FOR SOLUTION INTRAVENOUS at 21:25

## 2024-06-04 RX ADMIN — Medication 1 CAPSULE: at 09:56

## 2024-06-04 RX ADMIN — BUPROPION HYDROCHLORIDE 150 MG: 150 TABLET, FILM COATED, EXTENDED RELEASE ORAL at 21:25

## 2024-06-04 RX ADMIN — LEVOTHYROXINE SODIUM 25 MCG: 0.03 TABLET ORAL at 09:59

## 2024-06-04 RX ADMIN — GUAIFENESIN 600 MG: 600 TABLET, EXTENDED RELEASE ORAL at 21:25

## 2024-06-04 RX ADMIN — PIPERACILLIN AND TAZOBACTAM 3375 MG: 3; .375 INJECTION, POWDER, LYOPHILIZED, FOR SOLUTION INTRAVENOUS at 21:25

## 2024-06-04 RX ADMIN — METOPROLOL TARTRATE 50 MG: 50 TABLET, FILM COATED ORAL at 09:56

## 2024-06-04 RX ADMIN — MIRTAZAPINE 7.5 MG: 15 TABLET, ORALLY DISINTEGRATING ORAL at 21:25

## 2024-06-04 RX ADMIN — METOPROLOL TARTRATE 50 MG: 50 TABLET, FILM COATED ORAL at 21:25

## 2024-06-04 RX ADMIN — GUAIFENESIN 600 MG: 600 TABLET, EXTENDED RELEASE ORAL at 09:56

## 2024-06-04 RX ADMIN — FERROUS SULFATE TAB 325 MG (65 MG ELEMENTAL FE) 325 MG: 325 (65 FE) TAB at 09:56

## 2024-06-04 RX ADMIN — AMLODIPINE BESYLATE 10 MG: 5 TABLET ORAL at 09:56

## 2024-06-04 RX ADMIN — HYDRALAZINE HYDROCHLORIDE 50 MG: 50 TABLET ORAL at 09:56

## 2024-06-04 RX ADMIN — HYDRALAZINE HYDROCHLORIDE 50 MG: 50 TABLET ORAL at 21:25

## 2024-06-04 RX ADMIN — TAMSULOSIN HYDROCHLORIDE 0.4 MG: 0.4 CAPSULE ORAL at 09:56

## 2024-06-04 RX ADMIN — Medication 2 PUFF: at 20:27

## 2024-06-04 RX ADMIN — FERROUS SULFATE TAB 325 MG (65 MG ELEMENTAL FE) 325 MG: 325 (65 FE) TAB at 21:25

## 2024-06-04 RX ADMIN — BUPROPION HYDROCHLORIDE 150 MG: 150 TABLET, FILM COATED, EXTENDED RELEASE ORAL at 09:59

## 2024-06-04 RX ADMIN — DEXTROSE MONOHYDRATE: 100 INJECTION, SOLUTION INTRAVENOUS at 22:01

## 2024-06-04 RX ADMIN — SODIUM CHLORIDE, PRESERVATIVE FREE 10 ML: 5 INJECTION INTRAVENOUS at 21:26

## 2024-06-04 RX ADMIN — HYDRALAZINE HYDROCHLORIDE 50 MG: 50 TABLET ORAL at 15:37

## 2024-06-04 RX ADMIN — ATORVASTATIN CALCIUM 40 MG: 40 TABLET, FILM COATED ORAL at 09:56

## 2024-06-04 ASSESSMENT — PAIN SCALES - GENERAL: PAINLEVEL_OUTOF10: 0

## 2024-06-04 NOTE — PROCEDURES
PROCEDURE NOTE  Date: 6/4/2024   Name: Kvng Arellano  YOB: 1960    Procedures      Called to floor to place Midline for patient.  Discussed midline with patient, including risks and benefits, which he verbalized understanding of and agreed to placement at this time.  The left upper extremeity was evaluated and a large brachial vein was noted with catheter occupying less than 30% with good compressibility.  Midline was inserted sterilely using modified seldinger technique under ultrasound guidance.  Patient given lidocaine for local anesthetic, which he tolerated well.  Vessel easily accessed and guidewire advanced.  Introducer placed and guidewire removed.  Catheter trimmed to 10 cm and inserted easily with good blood return and easy flushing.  Catheter secured at 0 cm out with stabilization device and covered with CHG dressing.  Patient tolerated procedure well with no obvious adverse effects noted at this time.      REF:  [id number]  LOT  [id number]  EXP:  08/31/2025    Post insertion arm circumference 27 cm.

## 2024-06-05 LAB
ALBUMIN SERPL-MCNC: 2.1 G/DL (ref 3.5–5)
ALBUMIN/GLOB SERPL: 0.6 (ref 1.1–2.2)
ALP SERPL-CCNC: 130 U/L (ref 45–117)
ALT SERPL-CCNC: 35 U/L (ref 12–78)
ANION GAP SERPL CALC-SCNC: 10 MMOL/L (ref 5–15)
AST SERPL W P-5'-P-CCNC: 43 U/L (ref 15–37)
BACTERIA SPEC CULT: NORMAL
BACTERIA SPEC CULT: NORMAL
BASOPHILS # BLD: 0 K/UL (ref 0–0.1)
BASOPHILS NFR BLD: 0 % (ref 0–1)
BILIRUB SERPL-MCNC: 0.4 MG/DL (ref 0.2–1)
BUN SERPL-MCNC: 37 MG/DL (ref 6–20)
BUN/CREAT SERPL: 10 (ref 12–20)
CA-I BLD-MCNC: 8.1 MG/DL (ref 8.5–10.1)
CHLORIDE SERPL-SCNC: 107 MMOL/L (ref 97–108)
CO2 SERPL-SCNC: 19 MMOL/L (ref 21–32)
CREAT SERPL-MCNC: 3.61 MG/DL (ref 0.7–1.3)
DIFFERENTIAL METHOD BLD: ABNORMAL
EOSINOPHIL # BLD: 0.6 K/UL (ref 0–0.4)
EOSINOPHIL NFR BLD: 7 % (ref 0–7)
ERYTHROCYTE [DISTWIDTH] IN BLOOD BY AUTOMATED COUNT: 13.7 % (ref 11.5–14.5)
GLOBULIN SER CALC-MCNC: 3.7 G/DL (ref 2–4)
GLUCOSE BLD STRIP.AUTO-MCNC: 100 MG/DL (ref 65–100)
GLUCOSE BLD STRIP.AUTO-MCNC: 134 MG/DL (ref 65–100)
GLUCOSE BLD STRIP.AUTO-MCNC: 141 MG/DL (ref 65–100)
GLUCOSE BLD STRIP.AUTO-MCNC: 196 MG/DL (ref 65–100)
GLUCOSE SERPL-MCNC: 108 MG/DL (ref 65–100)
HCT VFR BLD AUTO: 25.5 % (ref 36.6–50.3)
HGB BLD-MCNC: 8.7 G/DL (ref 12.1–17)
IMM GRANULOCYTES # BLD AUTO: 0 K/UL (ref 0–0.04)
IMM GRANULOCYTES NFR BLD AUTO: 1 % (ref 0–0.5)
LYMPHOCYTES # BLD: 0.5 K/UL (ref 0.8–3.5)
LYMPHOCYTES NFR BLD: 5 % (ref 12–49)
Lab: NORMAL
Lab: NORMAL
MCH RBC QN AUTO: 31.5 PG (ref 26–34)
MCHC RBC AUTO-ENTMCNC: 34.1 G/DL (ref 30–36.5)
MCV RBC AUTO: 92.4 FL (ref 80–99)
MONOCYTES # BLD: 0.6 K/UL (ref 0–1)
MONOCYTES NFR BLD: 6 % (ref 5–13)
NEUTS SEG # BLD: 7 K/UL (ref 1.8–8)
NEUTS SEG NFR BLD: 81 % (ref 32–75)
NRBC # BLD: 0 K/UL (ref 0–0.01)
NRBC BLD-RTO: 0 PER 100 WBC
PERFORMED BY:: ABNORMAL
PERFORMED BY:: NORMAL
PLATELET # BLD AUTO: 220 K/UL (ref 150–400)
PMV BLD AUTO: 10 FL (ref 8.9–12.9)
POTASSIUM SERPL-SCNC: 3.9 MMOL/L (ref 3.5–5.1)
PROT SERPL-MCNC: 5.8 G/DL (ref 6.4–8.2)
RBC # BLD AUTO: 2.76 M/UL (ref 4.1–5.7)
SODIUM SERPL-SCNC: 136 MMOL/L (ref 136–145)
WBC # BLD AUTO: 8.7 K/UL (ref 4.1–11.1)

## 2024-06-05 PROCEDURE — 6370000000 HC RX 637 (ALT 250 FOR IP): Performed by: PHYSICIAN ASSISTANT

## 2024-06-05 PROCEDURE — 2700000000 HC OXYGEN THERAPY PER DAY

## 2024-06-05 PROCEDURE — 1100000000 HC RM PRIVATE

## 2024-06-05 PROCEDURE — 2580000003 HC RX 258: Performed by: STUDENT IN AN ORGANIZED HEALTH CARE EDUCATION/TRAINING PROGRAM

## 2024-06-05 PROCEDURE — 6360000002 HC RX W HCPCS: Performed by: STUDENT IN AN ORGANIZED HEALTH CARE EDUCATION/TRAINING PROGRAM

## 2024-06-05 PROCEDURE — 6370000000 HC RX 637 (ALT 250 FOR IP): Performed by: STUDENT IN AN ORGANIZED HEALTH CARE EDUCATION/TRAINING PROGRAM

## 2024-06-05 PROCEDURE — 6370000000 HC RX 637 (ALT 250 FOR IP): Performed by: INTERNAL MEDICINE

## 2024-06-05 PROCEDURE — 36415 COLL VENOUS BLD VENIPUNCTURE: CPT

## 2024-06-05 PROCEDURE — 80053 COMPREHEN METABOLIC PANEL: CPT

## 2024-06-05 PROCEDURE — 85025 COMPLETE CBC W/AUTO DIFF WBC: CPT

## 2024-06-05 PROCEDURE — 94640 AIRWAY INHALATION TREATMENT: CPT

## 2024-06-05 PROCEDURE — 94761 N-INVAS EAR/PLS OXIMETRY MLT: CPT

## 2024-06-05 PROCEDURE — 6370000000 HC RX 637 (ALT 250 FOR IP): Performed by: PSYCHIATRY & NEUROLOGY

## 2024-06-05 PROCEDURE — 82962 GLUCOSE BLOOD TEST: CPT

## 2024-06-05 PROCEDURE — 6360000002 HC RX W HCPCS: Performed by: INTERNAL MEDICINE

## 2024-06-05 PROCEDURE — C9113 INJ PANTOPRAZOLE SODIUM, VIA: HCPCS | Performed by: INTERNAL MEDICINE

## 2024-06-05 RX ADMIN — TRAZODONE HYDROCHLORIDE 50 MG: 50 TABLET ORAL at 05:33

## 2024-06-05 RX ADMIN — Medication 2 PUFF: at 07:57

## 2024-06-05 RX ADMIN — FERROUS SULFATE TAB 325 MG (65 MG ELEMENTAL FE) 325 MG: 325 (65 FE) TAB at 08:58

## 2024-06-05 RX ADMIN — SODIUM BICARBONATE 650 MG: 650 TABLET ORAL at 08:58

## 2024-06-05 RX ADMIN — SODIUM BICARBONATE 650 MG: 650 TABLET ORAL at 11:38

## 2024-06-05 RX ADMIN — METOPROLOL TARTRATE 50 MG: 50 TABLET, FILM COATED ORAL at 08:58

## 2024-06-05 RX ADMIN — MIRTAZAPINE 7.5 MG: 15 TABLET, ORALLY DISINTEGRATING ORAL at 22:33

## 2024-06-05 RX ADMIN — PIPERACILLIN AND TAZOBACTAM 3375 MG: 3; .375 INJECTION, POWDER, LYOPHILIZED, FOR SOLUTION INTRAVENOUS at 08:58

## 2024-06-05 RX ADMIN — ATORVASTATIN CALCIUM 40 MG: 40 TABLET, FILM COATED ORAL at 08:58

## 2024-06-05 RX ADMIN — HYDRALAZINE HYDROCHLORIDE 100 MG: 50 TABLET ORAL at 16:06

## 2024-06-05 RX ADMIN — Medication 1 CAPSULE: at 08:58

## 2024-06-05 RX ADMIN — AMLODIPINE BESYLATE 10 MG: 5 TABLET ORAL at 08:58

## 2024-06-05 RX ADMIN — PANTOPRAZOLE SODIUM 40 MG: 40 INJECTION, POWDER, FOR SOLUTION INTRAVENOUS at 08:58

## 2024-06-05 RX ADMIN — FERROUS SULFATE TAB 325 MG (65 MG ELEMENTAL FE) 325 MG: 325 (65 FE) TAB at 22:35

## 2024-06-05 RX ADMIN — HYDRALAZINE HYDROCHLORIDE 100 MG: 50 TABLET ORAL at 08:58

## 2024-06-05 RX ADMIN — TAMSULOSIN HYDROCHLORIDE 0.4 MG: 0.4 CAPSULE ORAL at 08:58

## 2024-06-05 RX ADMIN — GUAIFENESIN 600 MG: 600 TABLET, EXTENDED RELEASE ORAL at 08:58

## 2024-06-05 RX ADMIN — PANTOPRAZOLE SODIUM 40 MG: 40 INJECTION, POWDER, FOR SOLUTION INTRAVENOUS at 22:33

## 2024-06-05 RX ADMIN — SODIUM BICARBONATE 650 MG: 650 TABLET ORAL at 16:06

## 2024-06-05 RX ADMIN — METOPROLOL TARTRATE 50 MG: 50 TABLET, FILM COATED ORAL at 22:34

## 2024-06-05 RX ADMIN — LEVOTHYROXINE SODIUM 25 MCG: 0.03 TABLET ORAL at 08:58

## 2024-06-05 RX ADMIN — PIPERACILLIN AND TAZOBACTAM 4500 MG: 4; .5 INJECTION, POWDER, LYOPHILIZED, FOR SOLUTION INTRAVENOUS at 01:31

## 2024-06-05 RX ADMIN — BUPROPION HYDROCHLORIDE 150 MG: 150 TABLET, FILM COATED, EXTENDED RELEASE ORAL at 08:58

## 2024-06-05 RX ADMIN — DONEPEZIL HYDROCHLORIDE 5 MG: 5 TABLET, FILM COATED ORAL at 08:58

## 2024-06-05 RX ADMIN — HYDRALAZINE HYDROCHLORIDE 100 MG: 50 TABLET ORAL at 22:34

## 2024-06-05 RX ADMIN — Medication 2 PUFF: at 20:38

## 2024-06-05 RX ADMIN — SODIUM CHLORIDE, PRESERVATIVE FREE 10 ML: 5 INJECTION INTRAVENOUS at 22:35

## 2024-06-05 RX ADMIN — GUAIFENESIN 600 MG: 600 TABLET, EXTENDED RELEASE ORAL at 22:35

## 2024-06-05 RX ADMIN — INSULIN LISPRO 1 UNITS: 100 INJECTION, SOLUTION INTRAVENOUS; SUBCUTANEOUS at 11:38

## 2024-06-05 RX ADMIN — BUPROPION HYDROCHLORIDE 150 MG: 150 TABLET, FILM COATED, EXTENDED RELEASE ORAL at 22:33

## 2024-06-05 RX ADMIN — PIPERACILLIN AND TAZOBACTAM 3375 MG: 3; .375 INJECTION, POWDER, LYOPHILIZED, FOR SOLUTION INTRAVENOUS at 22:33

## 2024-06-05 ASSESSMENT — PAIN SCALES - GENERAL
PAINLEVEL_OUTOF10: 0
PAINLEVEL_OUTOF10: 0

## 2024-06-05 NOTE — CARE COORDINATION
CM reviewed chart. CTP [facility] when medically ready.  [facility] consulted for ACP planning. CM will continue to follow.

## 2024-06-05 NOTE — ACP (ADVANCE CARE PLANNING)
Advance Care Planning     Advance Care Planning Inpatient Note  Spiritual Care Department    Today's Date: 2024  Unit: [facility] 5 WEST MED/SURG    Received request from patient.  Upon review of chart and communication with care team, Spirit[facility] Care will defer advance care planning with patient at this time.. Patient was/were present in the room during visit.    Goals of ACP Conversation:  Pt is unable to complete Advance Medical Directive at this time    Health Care Decision Makers:     No healthcare decision makers have been documented.  Click here to complete HealthCare Decision Makers including selection of the Healthcare Decision Maker Relationship (ie \"Primary\")  Summary:  No Decision Maker named by patient at this time    Advance Care Planning Documents (Patient Wishes):  None     Assessment:   returned for ACP conversation and to complete Advance Medical Directive.  reviewed chart note from psychiatrist who deemed the patient to have capacity.  assessment is that the pt  is unable to complete an AMD at this time. Pt could not spell his last name and regarding the agent selection, pt provided the name of a  individual.  conferred with CM post visit at  informing her the  is refusing to complete the AMD at this time.    Interventions:  Pt is unable to engage in an ACP conversation at this time    Care Preferences Communicated:   No    Outcomes/Plan:  Teach Back Method used to verify the patient's and/or Healthcare Decision Maker's understanding of key information in the advance directive documents      Electronically signed by Chaplain Tanvi on 2024 at 1:25 PM

## 2024-06-06 ENCOUNTER — APPOINTMENT (OUTPATIENT)
Facility: HOSPITAL | Age: 64
DRG: 177 | End: 2024-06-06
Payer: MEDICARE

## 2024-06-06 LAB
ALBUMIN SERPL-MCNC: 1.8 G/DL (ref 3.5–5)
ALBUMIN/GLOB SERPL: 0.5 (ref 1.1–2.2)
ALP SERPL-CCNC: 111 U/L (ref 45–117)
ALT SERPL-CCNC: 27 U/L (ref 12–78)
ANION GAP SERPL CALC-SCNC: 7 MMOL/L (ref 5–15)
AST SERPL W P-5'-P-CCNC: 23 U/L (ref 15–37)
BASOPHILS # BLD: 0 K/UL (ref 0–0.1)
BASOPHILS NFR BLD: 0 % (ref 0–1)
BILIRUB SERPL-MCNC: 0.3 MG/DL (ref 0.2–1)
BUN SERPL-MCNC: 38 MG/DL (ref 6–20)
BUN/CREAT SERPL: 10 (ref 12–20)
CA-I BLD-MCNC: 8.1 MG/DL (ref 8.5–10.1)
CHLORIDE SERPL-SCNC: 111 MMOL/L (ref 97–108)
CO2 SERPL-SCNC: 23 MMOL/L (ref 21–32)
CREAT SERPL-MCNC: 3.87 MG/DL (ref 0.7–1.3)
DIFFERENTIAL METHOD BLD: ABNORMAL
EOSINOPHIL # BLD: 0.8 K/UL (ref 0–0.4)
EOSINOPHIL NFR BLD: 12 % (ref 0–7)
ERYTHROCYTE [DISTWIDTH] IN BLOOD BY AUTOMATED COUNT: 13.9 % (ref 11.5–14.5)
GLOBULIN SER CALC-MCNC: 3.5 G/DL (ref 2–4)
GLUCOSE BLD STRIP.AUTO-MCNC: 111 MG/DL (ref 65–100)
GLUCOSE BLD STRIP.AUTO-MCNC: 126 MG/DL (ref 65–100)
GLUCOSE BLD STRIP.AUTO-MCNC: 127 MG/DL (ref 65–100)
GLUCOSE BLD STRIP.AUTO-MCNC: 181 MG/DL (ref 65–100)
GLUCOSE SERPL-MCNC: 127 MG/DL (ref 65–100)
HCT VFR BLD AUTO: 24.1 % (ref 36.6–50.3)
HGB BLD-MCNC: 8.1 G/DL (ref 12.1–17)
IMM GRANULOCYTES # BLD AUTO: 0 K/UL (ref 0–0.04)
IMM GRANULOCYTES NFR BLD AUTO: 1 % (ref 0–0.5)
LYMPHOCYTES # BLD: 0.5 K/UL (ref 0.8–3.5)
LYMPHOCYTES NFR BLD: 7 % (ref 12–49)
MCH RBC QN AUTO: 31.3 PG (ref 26–34)
MCHC RBC AUTO-ENTMCNC: 33.6 G/DL (ref 30–36.5)
MCV RBC AUTO: 93.1 FL (ref 80–99)
MONOCYTES # BLD: 0.5 K/UL (ref 0–1)
MONOCYTES NFR BLD: 7 % (ref 5–13)
NEUTS SEG # BLD: 5.1 K/UL (ref 1.8–8)
NEUTS SEG NFR BLD: 73 % (ref 32–75)
NRBC # BLD: 0 K/UL (ref 0–0.01)
NRBC BLD-RTO: 0 PER 100 WBC
PERFORMED BY:: ABNORMAL
PHOSPHATE SERPL-MCNC: 3.5 MG/DL (ref 2.6–4.7)
PLATELET # BLD AUTO: 231 K/UL (ref 150–400)
PMV BLD AUTO: 10 FL (ref 8.9–12.9)
POTASSIUM SERPL-SCNC: 3.4 MMOL/L (ref 3.5–5.1)
PROT SERPL-MCNC: 5.3 G/DL (ref 6.4–8.2)
RBC # BLD AUTO: 2.59 M/UL (ref 4.1–5.7)
SODIUM SERPL-SCNC: 141 MMOL/L (ref 136–145)
WBC # BLD AUTO: 6.9 K/UL (ref 4.1–11.1)

## 2024-06-06 PROCEDURE — C9113 INJ PANTOPRAZOLE SODIUM, VIA: HCPCS | Performed by: INTERNAL MEDICINE

## 2024-06-06 PROCEDURE — 80053 COMPREHEN METABOLIC PANEL: CPT

## 2024-06-06 PROCEDURE — 6360000002 HC RX W HCPCS: Performed by: STUDENT IN AN ORGANIZED HEALTH CARE EDUCATION/TRAINING PROGRAM

## 2024-06-06 PROCEDURE — 6370000000 HC RX 637 (ALT 250 FOR IP): Performed by: STUDENT IN AN ORGANIZED HEALTH CARE EDUCATION/TRAINING PROGRAM

## 2024-06-06 PROCEDURE — 2580000003 HC RX 258: Performed by: STUDENT IN AN ORGANIZED HEALTH CARE EDUCATION/TRAINING PROGRAM

## 2024-06-06 PROCEDURE — 70450 CT HEAD/BRAIN W/O DYE: CPT

## 2024-06-06 PROCEDURE — 6370000000 HC RX 637 (ALT 250 FOR IP): Performed by: INTERNAL MEDICINE

## 2024-06-06 PROCEDURE — 6360000002 HC RX W HCPCS: Performed by: INTERNAL MEDICINE

## 2024-06-06 PROCEDURE — 97530 THERAPEUTIC ACTIVITIES: CPT

## 2024-06-06 PROCEDURE — 94640 AIRWAY INHALATION TREATMENT: CPT

## 2024-06-06 PROCEDURE — 85025 COMPLETE CBC W/AUTO DIFF WBC: CPT

## 2024-06-06 PROCEDURE — 6370000000 HC RX 637 (ALT 250 FOR IP): Performed by: PHYSICIAN ASSISTANT

## 2024-06-06 PROCEDURE — 1100000000 HC RM PRIVATE

## 2024-06-06 PROCEDURE — 84100 ASSAY OF PHOSPHORUS: CPT

## 2024-06-06 PROCEDURE — 36415 COLL VENOUS BLD VENIPUNCTURE: CPT

## 2024-06-06 PROCEDURE — 82962 GLUCOSE BLOOD TEST: CPT

## 2024-06-06 PROCEDURE — 94761 N-INVAS EAR/PLS OXIMETRY MLT: CPT

## 2024-06-06 RX ORDER — POTASSIUM CHLORIDE 20 MEQ/1
40 TABLET, EXTENDED RELEASE ORAL ONCE
Status: COMPLETED | OUTPATIENT
Start: 2024-06-06 | End: 2024-06-06

## 2024-06-06 RX ADMIN — PIPERACILLIN AND TAZOBACTAM 3375 MG: 3; .375 INJECTION, POWDER, LYOPHILIZED, FOR SOLUTION INTRAVENOUS at 09:53

## 2024-06-06 RX ADMIN — GUAIFENESIN 600 MG: 600 TABLET, EXTENDED RELEASE ORAL at 09:55

## 2024-06-06 RX ADMIN — POTASSIUM CHLORIDE 40 MEQ: 1500 TABLET, EXTENDED RELEASE ORAL at 11:53

## 2024-06-06 RX ADMIN — SODIUM BICARBONATE 650 MG: 650 TABLET ORAL at 17:48

## 2024-06-06 RX ADMIN — Medication 1 CAPSULE: at 09:55

## 2024-06-06 RX ADMIN — PANTOPRAZOLE SODIUM 40 MG: 40 INJECTION, POWDER, FOR SOLUTION INTRAVENOUS at 09:55

## 2024-06-06 RX ADMIN — FERROUS SULFATE TAB 325 MG (65 MG ELEMENTAL FE) 325 MG: 325 (65 FE) TAB at 09:55

## 2024-06-06 RX ADMIN — METOPROLOL TARTRATE 50 MG: 50 TABLET, FILM COATED ORAL at 09:55

## 2024-06-06 RX ADMIN — PIPERACILLIN AND TAZOBACTAM 3375 MG: 3; .375 INJECTION, POWDER, LYOPHILIZED, FOR SOLUTION INTRAVENOUS at 21:05

## 2024-06-06 RX ADMIN — SODIUM CHLORIDE, PRESERVATIVE FREE 10 ML: 5 INJECTION INTRAVENOUS at 09:55

## 2024-06-06 RX ADMIN — INSULIN LISPRO 1 UNITS: 100 INJECTION, SOLUTION INTRAVENOUS; SUBCUTANEOUS at 11:53

## 2024-06-06 RX ADMIN — LEVOTHYROXINE SODIUM 25 MCG: 0.03 TABLET ORAL at 06:40

## 2024-06-06 RX ADMIN — ATORVASTATIN CALCIUM 40 MG: 40 TABLET, FILM COATED ORAL at 09:55

## 2024-06-06 RX ADMIN — SODIUM CHLORIDE, PRESERVATIVE FREE 10 ML: 5 INJECTION INTRAVENOUS at 21:05

## 2024-06-06 RX ADMIN — SODIUM BICARBONATE 650 MG: 650 TABLET ORAL at 09:55

## 2024-06-06 RX ADMIN — TAMSULOSIN HYDROCHLORIDE 0.4 MG: 0.4 CAPSULE ORAL at 09:55

## 2024-06-06 RX ADMIN — BUPROPION HYDROCHLORIDE 150 MG: 150 TABLET, FILM COATED, EXTENDED RELEASE ORAL at 09:55

## 2024-06-06 RX ADMIN — DONEPEZIL HYDROCHLORIDE 5 MG: 5 TABLET, FILM COATED ORAL at 09:55

## 2024-06-06 RX ADMIN — HYDRALAZINE HYDROCHLORIDE 100 MG: 50 TABLET ORAL at 09:54

## 2024-06-06 RX ADMIN — Medication 2 PUFF: at 08:06

## 2024-06-06 RX ADMIN — SODIUM BICARBONATE 650 MG: 650 TABLET ORAL at 11:54

## 2024-06-06 RX ADMIN — HYDRALAZINE HYDROCHLORIDE 100 MG: 50 TABLET ORAL at 14:40

## 2024-06-06 RX ADMIN — AMLODIPINE BESYLATE 10 MG: 5 TABLET ORAL at 09:55

## 2024-06-06 RX ADMIN — Medication 2 PUFF: at 19:57

## 2024-06-06 ASSESSMENT — PAIN SCALES - GENERAL
PAINLEVEL_OUTOF10: 0

## 2024-06-06 NOTE — CARE COORDINATION
CM reviewed chart.    Patient DCP is [facility].    Per physician patient should be medically ready for dc tomorrow.    Ethics was consulted in regards to no legal NOK found and lay caregiver wanting/needing to make decisions for patient. It was decided by ethics that the pursuance of a legal guardian may benefit this patient and that this should be completed by the nursing home.     CM relayed the ethics recommendations to facility as well as sending over the ethics note via careport. Liaison aware of plan.     CM will continue to follow.

## 2024-06-06 NOTE — CONSULTS
[place] [id number]                              CONSULTATION      PATIENT NAME: NEY GARCIA           : 1960  MED REC NO: [id number]                       ROOM: 570  ACCOUNT NO: [id number]                       ADMIT DATE: 2024  PROVIDER: Cam Menjivar MD    DATE OF SERVICE:      ATTENDING PHYSICIAN:  MAYELIN SNYDER    REASON FOR CONSULT:  Refused to eat.  Dementia versus depression.  Consider mirtazapine.    We saw the patient, chart reviewed.    HISTORY OF PRESENT ILLNESS:  This is a 64-year-old  male, , admitted to medical inpatient, presented to triage, unwitnessed ground level fall from a standing position.  Denies loss of consciousness.  The patient apparently is a resident of one of the [facility] nursing homes.  He is there I believe 2-3 months.  He is from [facility].  Apparently, he lost his wife.  He lives alone. A couple that lives on the same property keeps an eye on him.  Reportedly, he was crying a lot.  He wished he is dead.  He says he is done, he wants to leave, but there is no active suicidal intent or plans to carry out.  Apparently, poor eating.  No children.  I believe he has a sister in [place].  Apparently, she does not want to deal with him in any way except maybe help him financially.  He is 64 years old.  There are friends Deysi and Eliezer, who are visiting. The patient is a little bit of hard of hearing  .  Alert, verbal, sometimes wrong answers.  He says that he had 3 MIs and a stroke.  He says he wants somebody to pull the plug.  Reportedly, he spit some brownish emesis 1 time. Currently, he is taking Wellbutrin 150 mg daily.    TRAUMA HISTORY:  Unknown.    SUBSTANCE ABUSE:  None.    MEDICAL HISTORY:  Pneumonia, possible aspiration, chronic kidney disease, type 2 diabetes mellitus, hypothyroidism, hypertension, hyperlipidemia, diastolic heart failure, 
         Nephrology Consultation          Patient: Kvng Arellano MRN: [id number]  SSN: [id number]    YOB: 1960  Age: 64 y.o.  Sex: male      Subjective:   Reason for the consultation.  Acute kidney injury on chronic kidney disease stage IV  History of present illness.  The patient is 64-year-old man with underlying history of dementia, diabetes mellitus type 2, hypertension was sent from the skilled nursing facility due to recurrent vomiting and respiratory distress.  On arrival in the ER he was hypoxic requiring 3 L nasal cannula and chest x-ray did show bilateral lung infiltrate suggestive of aspiration/pneumonia.  CBC showed leukocytosis.  Initial chemistry showed elevated BUN of 86 and creatinine 5.93 from his baseline of 2.6 in October 2023 which prompted nephrology consultation.  CT of the abdomen no evidence of hydronephrosis.  He received fluid boluses in the ER.  Clinically he is very confused.  No noticed lower extremity swelling.    Past Medical History:   Diagnosis Date    CVS disease     Diabetes (HCC)     High cholesterol     HTN (hypertension)      Past Surgical History:   Procedure Laterality Date    HERNIA REPAIR        History reviewed. No pertinent family history.  Social History     Tobacco Use    Smoking status: Never    Smokeless tobacco: Never   Substance Use Topics    Alcohol use: Never      Current Facility-Administered Medications   Medication Dose Route Frequency Provider Last Rate Last Admin    0.9 % sodium chloride infusion   IntraVENous Continuous Fatmata Monroy  mL/hr at 05/30/24 1028 New Bag at 05/30/24 1028     Current Outpatient Medications   Medication Sig Dispense Refill    donepezil (ARICEPT) 5 MG tablet Take 1 tablet by mouth every morning      ASPIRIN LOW DOSE 81 MG EC tablet TAKE ONE TABLET BY MOUTH DAILY. 30 tablet 0    glipiZIDE (GLUCOTROL) 5 MG tablet TAKE 1 TABLET BY MOUTH TWICE DAILY. 60 tablet 0    amLODIPine (NORVASC) 10 MG tablet Take 1 tablet 
Clinical Ethics Consultation Note    History and Context:  Principal Problem:    Community acquired bacterial pneumonia  Resolved Problems:    * No resolved hospital problems. *    Age: 64 y.o.  Gender: male  Gender Identity: male  Admitted Date: 5/30/2024  Consult Date: 06/06/24  MRN: [id number]  Valid Advanced Medical Directive: No    Process:  Received consult from care management after next of kin search did not identify family. Discussed situation with other stakeholders.     Ethical Question(s) and Concern(s):  Who can make medical decisions for Mr. Kvng Arellano because he has no identified family members?    Analysis:    [place] Hierarchy for Medical Decision Making    1. A court appointed guardian or healthcare power of  (paperwork required)    2. A legally  Spouse; except where a divorce action has been filed, but not yet finalized    3. An adult child, or majority of adult children who are reasonably available    4. A parent(s)    5. An adult brother or sister, or majority of adult siblings who are reasonably available    6. Any other relative of the patient in descending order of blood relationship    7. Except in cases involving proposed treatment for the withholding or withdrawing of a life-prolonging procedure, any adult, except any director, employee, or agent of a health care provider currently involved with the patient    Recommendations:  The patient's caregiver can assist with medical decision making for ongoing medical decisions.   If end of life decisions are needed in the future please place an ethics consult.     Closing:  Thank you for involving Ethics in the care of this patient. Please contact ethics on-call via [employer] with any immediate questions or concerns.     Ivette Baca   Ethics Consultant     Update: discussed with primary team at 11am. Patient's caregiver is wishing to have conversations regarding goals of care. Patient is nearly back to baseline and 
Gastroenterology Consult     Referring Physician: Karen Samano APRN - NP     Consult Date: 5/31/2024     Subjective:     Patient is a 64-year-old gentleman with multiple medical problems including diabetes mellitus chronic renal failure hypertension hyperlipidemia he was admitted to the hospital with nausea vomiting hyperkalemia and possibly hematemesis he has not had any further hematemesis however he feels nauseous he denies any abdominal pain or rectal bleeding   Nursing staff has not seen any overt bleeding either his hemoglobin did drop to 9.1.  His platelet count is normal.  He has no history of chronic liver disease.    Past Medical History:   Diagnosis Date    CVS disease     Diabetes (HCC)     High cholesterol     HTN (hypertension)      Past Surgical History:   Procedure Laterality Date    HERNIA REPAIR        History reviewed. No pertinent family history.  Social History     Tobacco Use    Smoking status: Never    Smokeless tobacco: Never   Substance Use Topics    Alcohol use: Never      Allergies   Allergen Reactions    Ace Inhibitors Swelling    Bee Venom      Other reaction(s): other/intolerance    Sulfa Antibiotics Itching     Current Facility-Administered Medications   Medication Dose Route Frequency    guaiFENesin (MUCINEX) extended release tablet 600 mg  600 mg Oral BID    budesonide-formoterol (SYMBICORT) 160-4.5 MCG/ACT inhaler 2 puff  2 puff Inhalation BID RT    hydrALAZINE (APRESOLINE) injection 10 mg  10 mg IntraVENous Q6H PRN    ipratropium 0.5 mg-albuterol 2.5 mg (DUONEB) nebulizer solution 1 Dose  1 Dose Inhalation Q6H WA RT    pantoprazole (PROTONIX) injection 40 mg  40 mg IntraVENous BID    0.9 % sodium chloride infusion   IntraVENous Continuous    amLODIPine (NORVASC) tablet 10 mg  10 mg Oral Daily    atorvastatin (LIPITOR) tablet 40 mg  40 mg Oral Daily    [Held by provider] clopidogrel (PLAVIX) tablet 75 mg  75 mg Oral Daily    donepezil (ARICEPT) tablet 5 mg  5 mg Oral QAM    insulin 
intake as evidenced by vomiting, nausea, weight loss greater than or equal to 5% in 1 month    Nutrition Interventions:   Food and/or Nutrient Delivery: Continue NPO  Nutrition Education/Counseling: No recommendation at this time  Coordination of Nutrition Care: Continue to monitor while inpatient       Goals:     Goals: PO intake 50% or greater, by next RD assessment       Nutrition Monitoring and Evaluation:   Behavioral-Environmental Outcomes: None Identified  Food/Nutrient Intake Outcomes: Food and Nutrient Intake  Physical Signs/Symptoms Outcomes: Weight, Nausea or Vomiting    Discharge Planning:    Too soon to determine     Lyssa Benjamin RD  Contact: [phone]

## 2024-06-06 NOTE — CARE COORDINATION
0863 Ethics consult placed regarding no NOK on [employer] search.   Ethics consulted to decide if lay caregiver can be deemed decision maker due to patient's dementia status.     [facility] is [facility]

## 2024-06-07 ENCOUNTER — APPOINTMENT (OUTPATIENT)
Facility: HOSPITAL | Age: 64
DRG: 177 | End: 2024-06-07
Payer: MEDICARE

## 2024-06-07 LAB
ALBUMIN SERPL-MCNC: 2 G/DL (ref 3.5–5)
ANION GAP SERPL CALC-SCNC: 7 MMOL/L (ref 5–15)
BASOPHILS # BLD: 0 K/UL (ref 0–0.1)
BASOPHILS NFR BLD: 0 % (ref 0–1)
BUN SERPL-MCNC: 38 MG/DL (ref 6–20)
BUN/CREAT SERPL: 9 (ref 12–20)
CA-I BLD-MCNC: 8.3 MG/DL (ref 8.5–10.1)
CHLORIDE SERPL-SCNC: 114 MMOL/L (ref 97–108)
CO2 SERPL-SCNC: 21 MMOL/L (ref 21–32)
CREAT SERPL-MCNC: 4.15 MG/DL (ref 0.7–1.3)
EOSINOPHIL # BLD: 0.4 K/UL (ref 0–0.4)
EOSINOPHIL NFR BLD: 5 % (ref 0–7)
ERYTHROCYTE [DISTWIDTH] IN BLOOD BY AUTOMATED COUNT: 14.1 % (ref 11.5–14.5)
GLUCOSE BLD STRIP.AUTO-MCNC: 100 MG/DL (ref 65–100)
GLUCOSE BLD STRIP.AUTO-MCNC: 109 MG/DL (ref 65–100)
GLUCOSE BLD STRIP.AUTO-MCNC: 130 MG/DL (ref 65–100)
GLUCOSE BLD STRIP.AUTO-MCNC: 91 MG/DL (ref 65–100)
GLUCOSE SERPL-MCNC: 122 MG/DL (ref 65–100)
HCT VFR BLD AUTO: 24.7 % (ref 36.6–50.3)
HGB BLD-MCNC: 8.3 G/DL (ref 12.1–17)
IMM GRANULOCYTES # BLD AUTO: 0 K/UL (ref 0–0.04)
IMM GRANULOCYTES NFR BLD AUTO: 0 % (ref 0–0.5)
LYMPHOCYTES # BLD: 0.5 K/UL (ref 0.8–3.5)
LYMPHOCYTES NFR BLD: 7 % (ref 12–49)
MCH RBC QN AUTO: 31.6 PG (ref 26–34)
MCHC RBC AUTO-ENTMCNC: 33.6 G/DL (ref 30–36.5)
MCV RBC AUTO: 93.9 FL (ref 80–99)
MONOCYTES # BLD: 0.5 K/UL (ref 0–1)
MONOCYTES NFR BLD: 7 % (ref 5–13)
NEUTS SEG # BLD: 5.6 K/UL (ref 1.8–8)
NEUTS SEG NFR BLD: 81 % (ref 32–75)
NRBC # BLD: 0 K/UL (ref 0–0.01)
NRBC BLD-RTO: 0 PER 100 WBC
PERFORMED BY:: ABNORMAL
PERFORMED BY:: ABNORMAL
PERFORMED BY:: NORMAL
PERFORMED BY:: NORMAL
PHOSPHATE SERPL-MCNC: 3.1 MG/DL (ref 2.6–4.7)
PLATELET # BLD AUTO: 251 K/UL (ref 150–400)
PMV BLD AUTO: 10 FL (ref 8.9–12.9)
POTASSIUM SERPL-SCNC: 3.5 MMOL/L (ref 3.5–5.1)
RBC # BLD AUTO: 2.63 M/UL (ref 4.1–5.7)
SODIUM SERPL-SCNC: 142 MMOL/L (ref 136–145)
WBC # BLD AUTO: 7 K/UL (ref 4.1–11.1)

## 2024-06-07 PROCEDURE — 6360000002 HC RX W HCPCS: Performed by: STUDENT IN AN ORGANIZED HEALTH CARE EDUCATION/TRAINING PROGRAM

## 2024-06-07 PROCEDURE — 97530 THERAPEUTIC ACTIVITIES: CPT

## 2024-06-07 PROCEDURE — 6370000000 HC RX 637 (ALT 250 FOR IP): Performed by: PSYCHIATRY & NEUROLOGY

## 2024-06-07 PROCEDURE — 6370000000 HC RX 637 (ALT 250 FOR IP): Performed by: PHYSICIAN ASSISTANT

## 2024-06-07 PROCEDURE — 2580000003 HC RX 258: Performed by: STUDENT IN AN ORGANIZED HEALTH CARE EDUCATION/TRAINING PROGRAM

## 2024-06-07 PROCEDURE — 6370000000 HC RX 637 (ALT 250 FOR IP): Performed by: INTERNAL MEDICINE

## 2024-06-07 PROCEDURE — 1100000000 HC RM PRIVATE

## 2024-06-07 PROCEDURE — 6360000002 HC RX W HCPCS: Performed by: INTERNAL MEDICINE

## 2024-06-07 PROCEDURE — 36415 COLL VENOUS BLD VENIPUNCTURE: CPT

## 2024-06-07 PROCEDURE — 85025 COMPLETE CBC W/AUTO DIFF WBC: CPT

## 2024-06-07 PROCEDURE — C9113 INJ PANTOPRAZOLE SODIUM, VIA: HCPCS | Performed by: INTERNAL MEDICINE

## 2024-06-07 PROCEDURE — 82962 GLUCOSE BLOOD TEST: CPT

## 2024-06-07 PROCEDURE — 94640 AIRWAY INHALATION TREATMENT: CPT

## 2024-06-07 PROCEDURE — 6370000000 HC RX 637 (ALT 250 FOR IP): Performed by: STUDENT IN AN ORGANIZED HEALTH CARE EDUCATION/TRAINING PROGRAM

## 2024-06-07 PROCEDURE — 71045 X-RAY EXAM CHEST 1 VIEW: CPT

## 2024-06-07 PROCEDURE — 97110 THERAPEUTIC EXERCISES: CPT

## 2024-06-07 PROCEDURE — 80069 RENAL FUNCTION PANEL: CPT

## 2024-06-07 RX ORDER — SODIUM CHLORIDE, SODIUM LACTATE, POTASSIUM CHLORIDE, CALCIUM CHLORIDE 600; 310; 30; 20 MG/100ML; MG/100ML; MG/100ML; MG/100ML
INJECTION, SOLUTION INTRAVENOUS CONTINUOUS
Status: DISCONTINUED | OUTPATIENT
Start: 2024-06-07 | End: 2024-06-08

## 2024-06-07 RX ADMIN — GUAIFENESIN 600 MG: 600 TABLET, EXTENDED RELEASE ORAL at 09:28

## 2024-06-07 RX ADMIN — PANTOPRAZOLE SODIUM 40 MG: 40 INJECTION, POWDER, FOR SOLUTION INTRAVENOUS at 09:29

## 2024-06-07 RX ADMIN — PIPERACILLIN AND TAZOBACTAM 3375 MG: 3; .375 INJECTION, POWDER, LYOPHILIZED, FOR SOLUTION INTRAVENOUS at 09:28

## 2024-06-07 RX ADMIN — MIRTAZAPINE 7.5 MG: 15 TABLET, ORALLY DISINTEGRATING ORAL at 20:26

## 2024-06-07 RX ADMIN — TAMSULOSIN HYDROCHLORIDE 0.4 MG: 0.4 CAPSULE ORAL at 09:28

## 2024-06-07 RX ADMIN — HYDRALAZINE HYDROCHLORIDE 100 MG: 50 TABLET ORAL at 20:19

## 2024-06-07 RX ADMIN — HYDRALAZINE HYDROCHLORIDE 100 MG: 50 TABLET ORAL at 09:28

## 2024-06-07 RX ADMIN — PANTOPRAZOLE SODIUM 40 MG: 40 INJECTION, POWDER, FOR SOLUTION INTRAVENOUS at 20:20

## 2024-06-07 RX ADMIN — SODIUM CHLORIDE, PRESERVATIVE FREE 10 ML: 5 INJECTION INTRAVENOUS at 20:19

## 2024-06-07 RX ADMIN — BUPROPION HYDROCHLORIDE 150 MG: 150 TABLET, FILM COATED, EXTENDED RELEASE ORAL at 09:28

## 2024-06-07 RX ADMIN — SODIUM CHLORIDE, POTASSIUM CHLORIDE, SODIUM LACTATE AND CALCIUM CHLORIDE: 600; 310; 30; 20 INJECTION, SOLUTION INTRAVENOUS at 09:39

## 2024-06-07 RX ADMIN — ATORVASTATIN CALCIUM 40 MG: 40 TABLET, FILM COATED ORAL at 09:28

## 2024-06-07 RX ADMIN — Medication 2 PUFF: at 21:19

## 2024-06-07 RX ADMIN — AMLODIPINE BESYLATE 10 MG: 5 TABLET ORAL at 09:28

## 2024-06-07 RX ADMIN — FERROUS SULFATE TAB 325 MG (65 MG ELEMENTAL FE) 325 MG: 325 (65 FE) TAB at 20:19

## 2024-06-07 RX ADMIN — LEVOTHYROXINE SODIUM 25 MCG: 0.03 TABLET ORAL at 05:01

## 2024-06-07 RX ADMIN — GUAIFENESIN 600 MG: 600 TABLET, EXTENDED RELEASE ORAL at 20:20

## 2024-06-07 RX ADMIN — PIPERACILLIN AND TAZOBACTAM 3375 MG: 3; .375 INJECTION, POWDER, LYOPHILIZED, FOR SOLUTION INTRAVENOUS at 20:16

## 2024-06-07 RX ADMIN — Medication 1 CAPSULE: at 09:28

## 2024-06-07 RX ADMIN — METOPROLOL TARTRATE 50 MG: 50 TABLET, FILM COATED ORAL at 20:20

## 2024-06-07 RX ADMIN — DONEPEZIL HYDROCHLORIDE 5 MG: 5 TABLET, FILM COATED ORAL at 09:28

## 2024-06-07 RX ADMIN — SODIUM CHLORIDE, PRESERVATIVE FREE 10 ML: 5 INJECTION INTRAVENOUS at 09:29

## 2024-06-07 RX ADMIN — FERROUS SULFATE TAB 325 MG (65 MG ELEMENTAL FE) 325 MG: 325 (65 FE) TAB at 09:28

## 2024-06-07 RX ADMIN — METOPROLOL TARTRATE 50 MG: 50 TABLET, FILM COATED ORAL at 09:28

## 2024-06-07 RX ADMIN — SODIUM BICARBONATE 650 MG: 650 TABLET ORAL at 09:28

## 2024-06-07 RX ADMIN — ERGOCALCIFEROL 50000 UNITS: 1.25 CAPSULE ORAL at 09:39

## 2024-06-07 RX ADMIN — Medication 2 PUFF: at 08:01

## 2024-06-07 RX ADMIN — BUPROPION HYDROCHLORIDE 150 MG: 150 TABLET, FILM COATED, EXTENDED RELEASE ORAL at 20:26

## 2024-06-07 ASSESSMENT — PAIN SCALES - GENERAL: PAINLEVEL_OUTOF10: 0

## 2024-06-07 NOTE — CARE COORDINATION
1132: CM reviewed chart.  Per IDRs patient will likely be ready for discharge tomorrow due to labs.    CM has reached out to [facility].  They would like to attempt to get authorization before patient returns.   has uploaded PT/OT notes and asked [facility] to start auth now.     will continue to follow up.    1320: [facility] started authorization.  Pending reference # [id number].

## 2024-06-08 LAB
ALBUMIN SERPL-MCNC: 1.9 G/DL (ref 3.5–5)
ANION GAP SERPL CALC-SCNC: 9 MMOL/L (ref 5–15)
BASOPHILS # BLD: 0 K/UL (ref 0–0.1)
BASOPHILS NFR BLD: 0 % (ref 0–1)
BUN SERPL-MCNC: 36 MG/DL (ref 6–20)
BUN/CREAT SERPL: 8 (ref 12–20)
CA-I BLD-MCNC: 7.9 MG/DL (ref 8.5–10.1)
CHLORIDE SERPL-SCNC: 114 MMOL/L (ref 97–108)
CO2 SERPL-SCNC: 21 MMOL/L (ref 21–32)
CREAT SERPL-MCNC: 4.5 MG/DL (ref 0.7–1.3)
DIFFERENTIAL METHOD BLD: ABNORMAL
EOSINOPHIL # BLD: 0.4 K/UL (ref 0–0.4)
EOSINOPHIL NFR BLD: 5 % (ref 0–7)
ERYTHROCYTE [DISTWIDTH] IN BLOOD BY AUTOMATED COUNT: 14.5 % (ref 11.5–14.5)
GLUCOSE BLD STRIP.AUTO-MCNC: 125 MG/DL (ref 65–100)
GLUCOSE BLD STRIP.AUTO-MCNC: 128 MG/DL (ref 65–100)
GLUCOSE BLD STRIP.AUTO-MCNC: 150 MG/DL (ref 65–100)
GLUCOSE BLD STRIP.AUTO-MCNC: 93 MG/DL (ref 65–100)
GLUCOSE SERPL-MCNC: 164 MG/DL (ref 65–100)
HCT VFR BLD AUTO: 25.1 % (ref 36.6–50.3)
HGB BLD-MCNC: 8.4 G/DL (ref 12.1–17)
IMM GRANULOCYTES # BLD AUTO: 0 K/UL (ref 0–0.04)
IMM GRANULOCYTES NFR BLD AUTO: 0 % (ref 0–0.5)
LYMPHOCYTES # BLD: 0.5 K/UL (ref 0.8–3.5)
LYMPHOCYTES NFR BLD: 6 % (ref 12–49)
MCH RBC QN AUTO: 31.5 PG (ref 26–34)
MCHC RBC AUTO-ENTMCNC: 33.5 G/DL (ref 30–36.5)
MCV RBC AUTO: 94 FL (ref 80–99)
MONOCYTES # BLD: 0.5 K/UL (ref 0–1)
MONOCYTES NFR BLD: 7 % (ref 5–13)
NEUTS SEG # BLD: 6.7 K/UL (ref 1.8–8)
NEUTS SEG NFR BLD: 82 % (ref 32–75)
NRBC # BLD: 0 K/UL (ref 0–0.01)
NRBC BLD-RTO: 0 PER 100 WBC
PERFORMED BY:: ABNORMAL
PERFORMED BY:: NORMAL
PHOSPHATE SERPL-MCNC: 2.9 MG/DL (ref 2.6–4.7)
PLATELET # BLD AUTO: 237 K/UL (ref 150–400)
PMV BLD AUTO: 10 FL (ref 8.9–12.9)
POTASSIUM SERPL-SCNC: 3.5 MMOL/L (ref 3.5–5.1)
RBC # BLD AUTO: 2.67 M/UL (ref 4.1–5.7)
SODIUM SERPL-SCNC: 144 MMOL/L (ref 136–145)
WBC # BLD AUTO: 8.1 K/UL (ref 4.1–11.1)

## 2024-06-08 PROCEDURE — 6370000000 HC RX 637 (ALT 250 FOR IP): Performed by: INTERNAL MEDICINE

## 2024-06-08 PROCEDURE — 1100000000 HC RM PRIVATE

## 2024-06-08 PROCEDURE — 6370000000 HC RX 637 (ALT 250 FOR IP): Performed by: PHYSICIAN ASSISTANT

## 2024-06-08 PROCEDURE — 2580000003 HC RX 258: Performed by: STUDENT IN AN ORGANIZED HEALTH CARE EDUCATION/TRAINING PROGRAM

## 2024-06-08 PROCEDURE — 36415 COLL VENOUS BLD VENIPUNCTURE: CPT

## 2024-06-08 PROCEDURE — 80069 RENAL FUNCTION PANEL: CPT

## 2024-06-08 PROCEDURE — 82962 GLUCOSE BLOOD TEST: CPT

## 2024-06-08 PROCEDURE — 2580000003 HC RX 258: Performed by: INTERNAL MEDICINE

## 2024-06-08 PROCEDURE — 94640 AIRWAY INHALATION TREATMENT: CPT

## 2024-06-08 PROCEDURE — 6370000000 HC RX 637 (ALT 250 FOR IP): Performed by: STUDENT IN AN ORGANIZED HEALTH CARE EDUCATION/TRAINING PROGRAM

## 2024-06-08 PROCEDURE — 6360000002 HC RX W HCPCS: Performed by: INTERNAL MEDICINE

## 2024-06-08 PROCEDURE — 6370000000 HC RX 637 (ALT 250 FOR IP): Performed by: PSYCHIATRY & NEUROLOGY

## 2024-06-08 PROCEDURE — 6360000002 HC RX W HCPCS: Performed by: STUDENT IN AN ORGANIZED HEALTH CARE EDUCATION/TRAINING PROGRAM

## 2024-06-08 PROCEDURE — 85025 COMPLETE CBC W/AUTO DIFF WBC: CPT

## 2024-06-08 PROCEDURE — C9113 INJ PANTOPRAZOLE SODIUM, VIA: HCPCS | Performed by: INTERNAL MEDICINE

## 2024-06-08 RX ORDER — SODIUM CHLORIDE 450 MG/100ML
INJECTION, SOLUTION INTRAVENOUS CONTINUOUS
Status: DISCONTINUED | OUTPATIENT
Start: 2024-06-08 | End: 2024-06-10 | Stop reason: HOSPADM

## 2024-06-08 RX ADMIN — Medication 2 PUFF: at 20:13

## 2024-06-08 RX ADMIN — FERROUS SULFATE TAB 325 MG (65 MG ELEMENTAL FE) 325 MG: 325 (65 FE) TAB at 21:39

## 2024-06-08 RX ADMIN — BUPROPION HYDROCHLORIDE 150 MG: 150 TABLET, FILM COATED, EXTENDED RELEASE ORAL at 21:47

## 2024-06-08 RX ADMIN — METOPROLOL TARTRATE 50 MG: 50 TABLET, FILM COATED ORAL at 21:39

## 2024-06-08 RX ADMIN — MIRTAZAPINE 7.5 MG: 15 TABLET, ORALLY DISINTEGRATING ORAL at 21:47

## 2024-06-08 RX ADMIN — GUAIFENESIN 600 MG: 600 TABLET, EXTENDED RELEASE ORAL at 21:39

## 2024-06-08 RX ADMIN — FERROUS SULFATE TAB 325 MG (65 MG ELEMENTAL FE) 325 MG: 325 (65 FE) TAB at 09:25

## 2024-06-08 RX ADMIN — DONEPEZIL HYDROCHLORIDE 5 MG: 5 TABLET, FILM COATED ORAL at 09:26

## 2024-06-08 RX ADMIN — AMLODIPINE BESYLATE 10 MG: 5 TABLET ORAL at 09:25

## 2024-06-08 RX ADMIN — SODIUM CHLORIDE, POTASSIUM CHLORIDE, SODIUM LACTATE AND CALCIUM CHLORIDE: 600; 310; 30; 20 INJECTION, SOLUTION INTRAVENOUS at 16:49

## 2024-06-08 RX ADMIN — EPOETIN ALFA-EPBX 10000 UNITS: 10000 INJECTION, SOLUTION INTRAVENOUS; SUBCUTANEOUS at 23:24

## 2024-06-08 RX ADMIN — SODIUM BICARBONATE 650 MG: 650 TABLET ORAL at 16:46

## 2024-06-08 RX ADMIN — BUPROPION HYDROCHLORIDE 150 MG: 150 TABLET, FILM COATED, EXTENDED RELEASE ORAL at 09:33

## 2024-06-08 RX ADMIN — PIPERACILLIN AND TAZOBACTAM 3375 MG: 3; .375 INJECTION, POWDER, LYOPHILIZED, FOR SOLUTION INTRAVENOUS at 21:37

## 2024-06-08 RX ADMIN — PANTOPRAZOLE SODIUM 40 MG: 40 INJECTION, POWDER, FOR SOLUTION INTRAVENOUS at 21:40

## 2024-06-08 RX ADMIN — PIPERACILLIN AND TAZOBACTAM 3375 MG: 3; .375 INJECTION, POWDER, LYOPHILIZED, FOR SOLUTION INTRAVENOUS at 09:26

## 2024-06-08 RX ADMIN — GUAIFENESIN 600 MG: 600 TABLET, EXTENDED RELEASE ORAL at 09:25

## 2024-06-08 RX ADMIN — ATORVASTATIN CALCIUM 40 MG: 40 TABLET, FILM COATED ORAL at 09:25

## 2024-06-08 RX ADMIN — SODIUM CHLORIDE: 4.5 INJECTION, SOLUTION INTRAVENOUS at 21:30

## 2024-06-08 RX ADMIN — HYDRALAZINE HYDROCHLORIDE 100 MG: 50 TABLET ORAL at 09:25

## 2024-06-08 RX ADMIN — METOPROLOL TARTRATE 50 MG: 50 TABLET, FILM COATED ORAL at 09:25

## 2024-06-08 RX ADMIN — SODIUM CHLORIDE, PRESERVATIVE FREE 10 ML: 5 INJECTION INTRAVENOUS at 21:30

## 2024-06-08 RX ADMIN — HYDRALAZINE HYDROCHLORIDE 100 MG: 50 TABLET ORAL at 21:40

## 2024-06-08 RX ADMIN — TAMSULOSIN HYDROCHLORIDE 0.4 MG: 0.4 CAPSULE ORAL at 09:25

## 2024-06-08 RX ADMIN — LEVOTHYROXINE SODIUM 25 MCG: 0.03 TABLET ORAL at 06:11

## 2024-06-08 RX ADMIN — PANTOPRAZOLE SODIUM 40 MG: 40 INJECTION, POWDER, FOR SOLUTION INTRAVENOUS at 09:26

## 2024-06-08 RX ADMIN — Medication 1 CAPSULE: at 09:25

## 2024-06-08 RX ADMIN — SODIUM BICARBONATE 650 MG: 650 TABLET ORAL at 09:24

## 2024-06-08 RX ADMIN — Medication 2 PUFF: at 08:46

## 2024-06-08 RX ADMIN — SODIUM BICARBONATE 650 MG: 650 TABLET ORAL at 12:17

## 2024-06-08 ASSESSMENT — PAIN SCALES - GENERAL
PAINLEVEL_OUTOF10: 0

## 2024-06-09 LAB
ALBUMIN SERPL-MCNC: 2 G/DL (ref 3.5–5)
ANION GAP SERPL CALC-SCNC: 7 MMOL/L (ref 5–15)
BASOPHILS # BLD: 0 K/UL (ref 0–0.1)
BASOPHILS NFR BLD: 0 % (ref 0–1)
BUN SERPL-MCNC: 34 MG/DL (ref 6–20)
BUN/CREAT SERPL: 8 (ref 12–20)
CA-I BLD-MCNC: 8.1 MG/DL (ref 8.5–10.1)
CHLORIDE SERPL-SCNC: 113 MMOL/L (ref 97–108)
CO2 SERPL-SCNC: 23 MMOL/L (ref 21–32)
CREAT SERPL-MCNC: 4.11 MG/DL (ref 0.7–1.3)
DIFFERENTIAL METHOD BLD: ABNORMAL
EOSINOPHIL # BLD: 0.7 K/UL (ref 0–0.4)
EOSINOPHIL NFR BLD: 8 % (ref 0–7)
ERYTHROCYTE [DISTWIDTH] IN BLOOD BY AUTOMATED COUNT: 14.3 % (ref 11.5–14.5)
GLUCOSE BLD STRIP.AUTO-MCNC: 109 MG/DL (ref 65–100)
GLUCOSE BLD STRIP.AUTO-MCNC: 116 MG/DL (ref 65–100)
GLUCOSE BLD STRIP.AUTO-MCNC: 176 MG/DL (ref 65–100)
GLUCOSE BLD STRIP.AUTO-MCNC: 210 MG/DL (ref 65–100)
GLUCOSE SERPL-MCNC: 96 MG/DL (ref 65–100)
HCT VFR BLD AUTO: 27 % (ref 36.6–50.3)
HGB BLD-MCNC: 9 G/DL (ref 12.1–17)
IMM GRANULOCYTES # BLD AUTO: 0 K/UL (ref 0–0.04)
IMM GRANULOCYTES NFR BLD AUTO: 1 % (ref 0–0.5)
LYMPHOCYTES # BLD: 0.7 K/UL (ref 0.8–3.5)
LYMPHOCYTES NFR BLD: 8 % (ref 12–49)
MCH RBC QN AUTO: 31.1 PG (ref 26–34)
MCV RBC AUTO: 93.4 FL (ref 80–99)
MONOCYTES # BLD: 0.5 K/UL (ref 0–1)
MONOCYTES NFR BLD: 6 % (ref 5–13)
NEUTS SEG # BLD: 6.5 K/UL (ref 1.8–8)
NEUTS SEG NFR BLD: 77 % (ref 32–75)
NRBC # BLD: 0 K/UL (ref 0–0.01)
NRBC BLD-RTO: 0 PER 100 WBC
PERFORMED BY:: ABNORMAL
PHOSPHATE SERPL-MCNC: 2.7 MG/DL (ref 2.6–4.7)
PLATELET # BLD AUTO: 253 K/UL (ref 150–400)
PMV BLD AUTO: 9.8 FL (ref 8.9–12.9)
POTASSIUM SERPL-SCNC: 3.2 MMOL/L (ref 3.5–5.1)
RBC # BLD AUTO: 2.89 M/UL (ref 4.1–5.7)
SODIUM SERPL-SCNC: 143 MMOL/L (ref 136–145)
WBC # BLD AUTO: 8.4 K/UL (ref 4.1–11.1)

## 2024-06-09 PROCEDURE — 6360000002 HC RX W HCPCS: Performed by: STUDENT IN AN ORGANIZED HEALTH CARE EDUCATION/TRAINING PROGRAM

## 2024-06-09 PROCEDURE — C9113 INJ PANTOPRAZOLE SODIUM, VIA: HCPCS | Performed by: INTERNAL MEDICINE

## 2024-06-09 PROCEDURE — 2580000003 HC RX 258: Performed by: INTERNAL MEDICINE

## 2024-06-09 PROCEDURE — 6370000000 HC RX 637 (ALT 250 FOR IP): Performed by: INTERNAL MEDICINE

## 2024-06-09 PROCEDURE — 94640 AIRWAY INHALATION TREATMENT: CPT

## 2024-06-09 PROCEDURE — 6370000000 HC RX 637 (ALT 250 FOR IP): Performed by: PSYCHIATRY & NEUROLOGY

## 2024-06-09 PROCEDURE — 97110 THERAPEUTIC EXERCISES: CPT

## 2024-06-09 PROCEDURE — 6370000000 HC RX 637 (ALT 250 FOR IP): Performed by: PHYSICIAN ASSISTANT

## 2024-06-09 PROCEDURE — 2580000003 HC RX 258: Performed by: STUDENT IN AN ORGANIZED HEALTH CARE EDUCATION/TRAINING PROGRAM

## 2024-06-09 PROCEDURE — 36415 COLL VENOUS BLD VENIPUNCTURE: CPT

## 2024-06-09 PROCEDURE — 1100000000 HC RM PRIVATE

## 2024-06-09 PROCEDURE — 80069 RENAL FUNCTION PANEL: CPT

## 2024-06-09 PROCEDURE — 85025 COMPLETE CBC W/AUTO DIFF WBC: CPT

## 2024-06-09 PROCEDURE — 82962 GLUCOSE BLOOD TEST: CPT

## 2024-06-09 PROCEDURE — 97530 THERAPEUTIC ACTIVITIES: CPT

## 2024-06-09 PROCEDURE — 82550 ASSAY OF CK (CPK): CPT

## 2024-06-09 PROCEDURE — 6360000002 HC RX W HCPCS: Performed by: INTERNAL MEDICINE

## 2024-06-09 PROCEDURE — 6370000000 HC RX 637 (ALT 250 FOR IP): Performed by: STUDENT IN AN ORGANIZED HEALTH CARE EDUCATION/TRAINING PROGRAM

## 2024-06-09 RX ORDER — POTASSIUM CHLORIDE 20 MEQ/1
40 TABLET, EXTENDED RELEASE ORAL ONCE
Status: COMPLETED | OUTPATIENT
Start: 2024-06-09 | End: 2024-06-09

## 2024-06-09 RX ADMIN — MIRTAZAPINE 7.5 MG: 15 TABLET, ORALLY DISINTEGRATING ORAL at 20:46

## 2024-06-09 RX ADMIN — METOPROLOL TARTRATE 50 MG: 50 TABLET, FILM COATED ORAL at 20:46

## 2024-06-09 RX ADMIN — METOPROLOL TARTRATE 50 MG: 50 TABLET, FILM COATED ORAL at 08:55

## 2024-06-09 RX ADMIN — BUPROPION HYDROCHLORIDE 150 MG: 150 TABLET, FILM COATED, EXTENDED RELEASE ORAL at 08:54

## 2024-06-09 RX ADMIN — SODIUM BICARBONATE 650 MG: 650 TABLET ORAL at 08:56

## 2024-06-09 RX ADMIN — HYDRALAZINE HYDROCHLORIDE 100 MG: 50 TABLET ORAL at 20:46

## 2024-06-09 RX ADMIN — SODIUM CHLORIDE, PRESERVATIVE FREE 10 ML: 5 INJECTION INTRAVENOUS at 20:46

## 2024-06-09 RX ADMIN — SODIUM BICARBONATE 650 MG: 650 TABLET ORAL at 11:47

## 2024-06-09 RX ADMIN — Medication 2 PUFF: at 08:18

## 2024-06-09 RX ADMIN — Medication 1 CAPSULE: at 08:56

## 2024-06-09 RX ADMIN — LEVOTHYROXINE SODIUM 25 MCG: 0.03 TABLET ORAL at 08:55

## 2024-06-09 RX ADMIN — INSULIN LISPRO 2 UNITS: 100 INJECTION, SOLUTION INTRAVENOUS; SUBCUTANEOUS at 13:34

## 2024-06-09 RX ADMIN — SODIUM CHLORIDE, PRESERVATIVE FREE 10 ML: 5 INJECTION INTRAVENOUS at 08:56

## 2024-06-09 RX ADMIN — PIPERACILLIN AND TAZOBACTAM 3375 MG: 3; .375 INJECTION, POWDER, LYOPHILIZED, FOR SOLUTION INTRAVENOUS at 08:56

## 2024-06-09 RX ADMIN — BUPROPION HYDROCHLORIDE 150 MG: 150 TABLET, FILM COATED, EXTENDED RELEASE ORAL at 20:46

## 2024-06-09 RX ADMIN — TAMSULOSIN HYDROCHLORIDE 0.4 MG: 0.4 CAPSULE ORAL at 08:54

## 2024-06-09 RX ADMIN — FERROUS SULFATE TAB 325 MG (65 MG ELEMENTAL FE) 325 MG: 325 (65 FE) TAB at 08:53

## 2024-06-09 RX ADMIN — ATORVASTATIN CALCIUM 40 MG: 40 TABLET, FILM COATED ORAL at 08:56

## 2024-06-09 RX ADMIN — PANTOPRAZOLE SODIUM 40 MG: 40 INJECTION, POWDER, FOR SOLUTION INTRAVENOUS at 20:46

## 2024-06-09 RX ADMIN — PIPERACILLIN AND TAZOBACTAM 3375 MG: 3; .375 INJECTION, POWDER, LYOPHILIZED, FOR SOLUTION INTRAVENOUS at 20:46

## 2024-06-09 RX ADMIN — SODIUM CHLORIDE: 4.5 INJECTION, SOLUTION INTRAVENOUS at 17:08

## 2024-06-09 RX ADMIN — SODIUM BICARBONATE 650 MG: 650 TABLET ORAL at 17:08

## 2024-06-09 RX ADMIN — POTASSIUM CHLORIDE 40 MEQ: 1500 TABLET, EXTENDED RELEASE ORAL at 11:47

## 2024-06-09 RX ADMIN — FERROUS SULFATE TAB 325 MG (65 MG ELEMENTAL FE) 325 MG: 325 (65 FE) TAB at 20:46

## 2024-06-09 RX ADMIN — AMLODIPINE BESYLATE 10 MG: 5 TABLET ORAL at 08:55

## 2024-06-09 RX ADMIN — INSULIN LISPRO 0 UNITS: 100 INJECTION, SOLUTION INTRAVENOUS; SUBCUTANEOUS at 22:30

## 2024-06-09 RX ADMIN — GUAIFENESIN 600 MG: 600 TABLET, EXTENDED RELEASE ORAL at 08:56

## 2024-06-09 RX ADMIN — Medication 2 PUFF: at 20:16

## 2024-06-09 RX ADMIN — HYDRALAZINE HYDROCHLORIDE 100 MG: 50 TABLET ORAL at 08:54

## 2024-06-09 RX ADMIN — HYDRALAZINE HYDROCHLORIDE 100 MG: 50 TABLET ORAL at 15:10

## 2024-06-09 RX ADMIN — GUAIFENESIN 600 MG: 600 TABLET, EXTENDED RELEASE ORAL at 20:46

## 2024-06-09 RX ADMIN — DONEPEZIL HYDROCHLORIDE 5 MG: 5 TABLET, FILM COATED ORAL at 08:54

## 2024-06-09 RX ADMIN — PANTOPRAZOLE SODIUM 40 MG: 40 INJECTION, POWDER, FOR SOLUTION INTRAVENOUS at 08:56

## 2024-06-09 ASSESSMENT — PAIN SCALES - GENERAL
PAINLEVEL_OUTOF10: 0

## 2024-06-10 VITALS
OXYGEN SATURATION: 98 % | TEMPERATURE: 97.5 F | HEART RATE: 77 BPM | BODY MASS INDEX: 18.19 KG/M2 | DIASTOLIC BLOOD PRESSURE: 78 MMHG | SYSTOLIC BLOOD PRESSURE: 146 MMHG | WEIGHT: 120 LBS | RESPIRATION RATE: 17 BRPM | HEIGHT: 68 IN

## 2024-06-10 PROBLEM — E87.0 HYPERNATREMIA: Status: ACTIVE | Noted: 2024-06-10

## 2024-06-10 PROBLEM — E46 PROTEIN CALORIE MALNUTRITION (HCC): Status: ACTIVE | Noted: 2024-06-10

## 2024-06-10 PROBLEM — E87.20 METABOLIC ACIDOSIS: Status: ACTIVE | Noted: 2024-06-10

## 2024-06-10 PROBLEM — R09.02 HYPOXIA: Status: ACTIVE | Noted: 2024-06-10

## 2024-06-10 PROBLEM — R62.7 FAILURE TO THRIVE IN ADULT: Status: ACTIVE | Noted: 2024-06-10

## 2024-06-10 PROBLEM — N17.9 ACUTE RENAL FAILURE (HCC): Status: ACTIVE | Noted: 2024-06-10

## 2024-06-10 LAB
ALBUMIN SERPL-MCNC: 1.9 G/DL (ref 3.5–5)
ALBUMIN/GLOB SERPL: 0.5 (ref 1.1–2.2)
ALP SERPL-CCNC: 124 U/L (ref 45–117)
ALT SERPL-CCNC: 26 U/L (ref 12–78)
ANION GAP SERPL CALC-SCNC: 6 MMOL/L (ref 5–15)
AST SERPL W P-5'-P-CCNC: 21 U/L (ref 15–37)
BASOPHILS # BLD: 0 K/UL (ref 0–0.1)
BASOPHILS NFR BLD: 0 % (ref 0–1)
BILIRUB SERPL-MCNC: 0.3 MG/DL (ref 0.2–1)
BUN SERPL-MCNC: 34 MG/DL (ref 6–20)
BUN/CREAT SERPL: 9 (ref 12–20)
CA-I BLD-MCNC: 8 MG/DL (ref 8.5–10.1)
CHLORIDE SERPL-SCNC: 113 MMOL/L (ref 97–108)
CO2 SERPL-SCNC: 22 MMOL/L (ref 21–32)
CREAT SERPL-MCNC: 3.99 MG/DL (ref 0.7–1.3)
DIFFERENTIAL METHOD BLD: ABNORMAL
EOSINOPHIL # BLD: 0.8 K/UL (ref 0–0.4)
EOSINOPHIL NFR BLD: 8 % (ref 0–7)
ERYTHROCYTE [DISTWIDTH] IN BLOOD BY AUTOMATED COUNT: 14.7 % (ref 11.5–14.5)
GLOBULIN SER CALC-MCNC: 3.9 G/DL (ref 2–4)
GLUCOSE BLD STRIP.AUTO-MCNC: 149 MG/DL (ref 65–100)
GLUCOSE BLD STRIP.AUTO-MCNC: 96 MG/DL (ref 65–100)
GLUCOSE SERPL-MCNC: 95 MG/DL (ref 65–100)
HCT VFR BLD AUTO: 26.6 % (ref 36.6–50.3)
HGB BLD-MCNC: 8.9 G/DL (ref 12.1–17)
IMM GRANULOCYTES # BLD AUTO: 0.1 K/UL (ref 0–0.04)
IMM GRANULOCYTES NFR BLD AUTO: 1 % (ref 0–0.5)
LYMPHOCYTES # BLD: 0.7 K/UL (ref 0.8–3.5)
LYMPHOCYTES NFR BLD: 7 % (ref 12–49)
MAGNESIUM SERPL-MCNC: 1.3 MG/DL (ref 1.6–2.4)
MCH RBC QN AUTO: 31.3 PG (ref 26–34)
MCHC RBC AUTO-ENTMCNC: 33.5 G/DL (ref 30–36.5)
MCV RBC AUTO: 93.7 FL (ref 80–99)
MONOCYTES # BLD: 0.5 K/UL (ref 0–1)
MONOCYTES NFR BLD: 5 % (ref 5–13)
NEUTS SEG # BLD: 7.9 K/UL (ref 1.8–8)
NEUTS SEG NFR BLD: 79 % (ref 32–75)
NRBC # BLD: 0 K/UL (ref 0–0.01)
NRBC BLD-RTO: 0 PER 100 WBC
PERFORMED BY:: ABNORMAL
PERFORMED BY:: NORMAL
PHOSPHATE SERPL-MCNC: 2.4 MG/DL (ref 2.6–4.7)
PLATELET # BLD AUTO: 251 K/UL (ref 150–400)
PMV BLD AUTO: 9.9 FL (ref 8.9–12.9)
POTASSIUM SERPL-SCNC: 3.8 MMOL/L (ref 3.5–5.1)
PROT SERPL-MCNC: 5.8 G/DL (ref 6.4–8.2)
RBC # BLD AUTO: 2.84 M/UL (ref 4.1–5.7)
SODIUM SERPL-SCNC: 141 MMOL/L (ref 136–145)
WBC # BLD AUTO: 10 K/UL (ref 4.1–11.1)

## 2024-06-10 PROCEDURE — 94640 AIRWAY INHALATION TREATMENT: CPT

## 2024-06-10 PROCEDURE — 6370000000 HC RX 637 (ALT 250 FOR IP): Performed by: INTERNAL MEDICINE

## 2024-06-10 PROCEDURE — 2580000003 HC RX 258: Performed by: STUDENT IN AN ORGANIZED HEALTH CARE EDUCATION/TRAINING PROGRAM

## 2024-06-10 PROCEDURE — 82962 GLUCOSE BLOOD TEST: CPT

## 2024-06-10 PROCEDURE — 2580000003 HC RX 258: Performed by: INTERNAL MEDICINE

## 2024-06-10 PROCEDURE — 6370000000 HC RX 637 (ALT 250 FOR IP): Performed by: STUDENT IN AN ORGANIZED HEALTH CARE EDUCATION/TRAINING PROGRAM

## 2024-06-10 PROCEDURE — 6360000002 HC RX W HCPCS: Performed by: INTERNAL MEDICINE

## 2024-06-10 PROCEDURE — C9113 INJ PANTOPRAZOLE SODIUM, VIA: HCPCS | Performed by: INTERNAL MEDICINE

## 2024-06-10 PROCEDURE — 6360000002 HC RX W HCPCS: Performed by: STUDENT IN AN ORGANIZED HEALTH CARE EDUCATION/TRAINING PROGRAM

## 2024-06-10 PROCEDURE — 36415 COLL VENOUS BLD VENIPUNCTURE: CPT

## 2024-06-10 PROCEDURE — 83735 ASSAY OF MAGNESIUM: CPT

## 2024-06-10 PROCEDURE — 94761 N-INVAS EAR/PLS OXIMETRY MLT: CPT

## 2024-06-10 PROCEDURE — 85025 COMPLETE CBC W/AUTO DIFF WBC: CPT

## 2024-06-10 PROCEDURE — 84100 ASSAY OF PHOSPHORUS: CPT

## 2024-06-10 PROCEDURE — 80053 COMPREHEN METABOLIC PANEL: CPT

## 2024-06-10 PROCEDURE — 6370000000 HC RX 637 (ALT 250 FOR IP): Performed by: PHYSICIAN ASSISTANT

## 2024-06-10 RX ORDER — AMLODIPINE BESYLATE 10 MG/1
10 TABLET ORAL DAILY
Qty: 90 TABLET | Refills: 0 | Status: SHIPPED | OUTPATIENT
Start: 2024-06-10

## 2024-06-10 RX ORDER — AMOXICILLIN AND CLAVULANATE POTASSIUM 875; 125 MG/1; MG/1
1 TABLET, FILM COATED ORAL 2 TIMES DAILY
Qty: 14 TABLET | Refills: 0 | Status: SHIPPED | OUTPATIENT
Start: 2024-06-10 | End: 2024-06-17

## 2024-06-10 RX ORDER — MIRTAZAPINE 15 MG/1
7.5 TABLET, ORALLY DISINTEGRATING ORAL NIGHTLY
Qty: 30 TABLET | Refills: 3 | Status: SHIPPED | OUTPATIENT
Start: 2024-06-10

## 2024-06-10 RX ORDER — SODIUM BICARBONATE 650 MG/1
650 TABLET ORAL
Qty: 90 TABLET | Refills: 0 | Status: SHIPPED | OUTPATIENT
Start: 2024-06-10 | End: 2024-07-10

## 2024-06-10 RX ORDER — ATORVASTATIN CALCIUM 40 MG/1
40 TABLET, FILM COATED ORAL DAILY
Qty: 90 TABLET | Refills: 0 | Status: SHIPPED | OUTPATIENT
Start: 2024-06-10

## 2024-06-10 RX ORDER — MAGNESIUM SULFATE 1 G/100ML
1000 INJECTION INTRAVENOUS ONCE
Status: COMPLETED | OUTPATIENT
Start: 2024-06-10 | End: 2024-06-10

## 2024-06-10 RX ADMIN — SODIUM CHLORIDE: 4.5 INJECTION, SOLUTION INTRAVENOUS at 07:11

## 2024-06-10 RX ADMIN — Medication 2 PUFF: at 07:26

## 2024-06-10 RX ADMIN — SODIUM BICARBONATE 650 MG: 650 TABLET ORAL at 12:42

## 2024-06-10 RX ADMIN — SODIUM CHLORIDE, PRESERVATIVE FREE 10 ML: 5 INJECTION INTRAVENOUS at 09:13

## 2024-06-10 RX ADMIN — MAGNESIUM SULFATE HEPTAHYDRATE 1000 MG: 1 INJECTION, SOLUTION INTRAVENOUS at 12:42

## 2024-06-10 RX ADMIN — PANTOPRAZOLE SODIUM 40 MG: 40 INJECTION, POWDER, FOR SOLUTION INTRAVENOUS at 09:12

## 2024-06-10 RX ADMIN — LEVOTHYROXINE SODIUM 25 MCG: 0.03 TABLET ORAL at 05:28

## 2024-06-10 RX ADMIN — PIPERACILLIN AND TAZOBACTAM 3375 MG: 3; .375 INJECTION, POWDER, LYOPHILIZED, FOR SOLUTION INTRAVENOUS at 09:12

## 2024-06-10 ASSESSMENT — PAIN SCALES - GENERAL: PAINLEVEL_OUTOF10: 0

## 2024-06-10 NOTE — PLAN OF CARE
06/08/24 2138   External Urinary Catheter   Placement Date/Time: 06/01/24 2144     Site Assessment Clean,dry & intact   Placement Replaced   Securement Method Tape   Catheter Care Catheter/Wick replaced;Suction Canister/Tubing changed   Perineal Care Yes   Suction 40 mmgHg continuous   Urine Color Yellow   Urine Appearance Clear   Output (mL) 400 mL   Urine Assessment   Urinary Status Voiding;External catheter   Urinary Incontinence Present   Urine Color Yellow/straw   Urine Appearance Clear   Bladder Scan Volume (mL) 30 mL     Problem: Skin/Tissue Integrity  Goal: Absence of new skin breakdown  Description: 1.  Monitor for areas of redness and/or skin breakdown  2.  Assess vascular access sites hourly  3.  Every 4-6 hours minimum:  Change oxygen saturation probe site  4.  Every 4-6 hours:  If on nasal continuous positive airway pressure, respiratory therapy assess nares and determine need for appliance change or resting period.  Outcome: Progressing     Problem: Safety - Adult  Goal: Free from fall injury  Outcome: Progressing     
  Problem: Discharge Planning  Goal: Discharge to home or other facility with appropriate resources  5/30/2024 2340 by Hayley Espinoza RN  Outcome: Progressing  5/30/2024 1732 by Sierra Jones RN  Outcome: Progressing  Flowsheets (Taken 5/30/2024 1454)  Discharge to home or other facility with appropriate resources:   Identify barriers to discharge with patient and caregiver   Arrange for needed discharge resources and transportation as appropriate   Identify discharge learning needs (meds, wound care, etc)     Problem: Safety - Adult  Goal: Free from fall injury  5/30/2024 2340 by Hayley Espinoza RN  Outcome: Progressing  5/30/2024 1732 by Sierra Jones RN  Outcome: Progressing     Problem: Skin/Tissue Integrity  Goal: Absence of new skin breakdown  Description: 1.  Monitor for areas of redness and/or skin breakdown  2.  Assess vascular access sites hourly  3.  Every 4-6 hours minimum:  Change oxygen saturation probe site  4.  Every 4-6 hours:  If on nasal continuous positive airway pressure, respiratory therapy assess nares and determine need for appliance change or resting period.  5/30/2024 2340 by Hayley Espinoza RN  Outcome: Progressing  5/30/2024 1732 by Sierra Jones, RN  Outcome: Progressing     Problem: ABCDS Injury Assessment  Goal: Absence of physical injury  5/30/2024 2340 by Hayley Espinoza RN  Outcome: Progressing  5/30/2024 1732 by Sierra Jones, RN  Outcome: Progressing     
  Problem: Discharge Planning  Goal: Discharge to home or other facility with appropriate resources  6/1/2024 0104 by Hayley Espinoza RN  Outcome: Progressing  5/31/2024 2013 by Lisa Tim LPN  Outcome: Progressing  Flowsheets (Taken 5/31/2024 0900)  Discharge to home or other facility with appropriate resources: Identify barriers to discharge with patient and caregiver     Problem: ABCDS Injury Assessment  Goal: Absence of physical injury  6/1/2024 0104 by Hayley Espinoza RN  Outcome: Progressing  5/31/2024 2013 by Lisa Tim LPN  Outcome: Progressing  Flowsheets (Taken 5/31/2024 0900)  Absence of Physical Injury: Implement safety measures based on patient assessment     Problem: Physical Therapy - Adult  Goal: By Discharge: Performs mobility at highest level of function for planned discharge setting.  See evaluation for individualized goals.  Description: FUNCTIONAL STATUS PRIOR TO ADMISSION: Patient was modified independent using a rolling walker for functional mobility.    HOME SUPPORT PRIOR TO ADMISSION: pt admitted from skilled nursing facility. LTC resident for ~10mo    Physical Therapy Goals  Initiated 5/31/2024  Pt stated goal: \"I'm going to stand\"  Pt will be I with LE HEP in 7 days.  Pt will perform bed mobility with [place] in 7 days.  Pt will perform transfers with Modified [place] in 7 days.   Pt will amb 50-75 feet with LRAD safely with Contact Guard Assist in 7 days.  Pt will demonstrate improvement in standing balance from fair to good in 7 days.     5/31/2024 1418 by Catherine Pak PT  Outcome: Progressing     
  Problem: Discharge Planning  Goal: Discharge to home or other facility with appropriate resources  6/6/2024 1006 by Sierra Jones RN  Outcome: Progressing  Flowsheets (Taken 6/6/2024 0955)  Discharge to home or other facility with appropriate resources:   Identify barriers to discharge with patient and caregiver   Arrange for needed discharge resources and transportation as appropriate   Identify discharge learning needs (meds, wound care, etc)  6/6/2024 0036 by Christine Green RN  Outcome: Progressing  Flowsheets (Taken 6/5/2024 2100)  Discharge to home or other facility with appropriate resources: Identify barriers to discharge with patient and caregiver     Problem: Safety - Adult  Goal: Free from fall injury  6/6/2024 1006 by Sierra Jones RN  Outcome: Progressing  6/6/2024 0036 by Christine Green RN  Outcome: Progressing  Flowsheets (Taken 6/6/2024 0033)  Free From Fall Injury: Instruct family/caregiver on patient safety     Problem: Skin/Tissue Integrity  Goal: Absence of new skin breakdown  Description: 1.  Monitor for areas of redness and/or skin breakdown  2.  Assess vascular access sites hourly  3.  Every 4-6 hours minimum:  Change oxygen saturation probe site  4.  Every 4-6 hours:  If on nasal continuous positive airway pressure, respiratory therapy assess nares and determine need for appliance change or resting period.  6/6/2024 1006 by Sierra Jones RN  Outcome: Progressing  6/6/2024 0036 by Christine Green RN  Outcome: Progressing     Problem: ABCDS Injury Assessment  Goal: Absence of physical injury  6/6/2024 1006 by Sierra Jones RN  Outcome: Progressing  6/6/2024 0036 by Christine Green RN  Outcome: Progressing  Flowsheets (Taken 6/6/2024 0033)  Absence of Physical Injury: Implement safety measures based on patient assessment     Problem: Nutrition Deficit:  Goal: Optimize nutritional status  6/6/2024 1006 by Sierra Jones RN  Outcome: Progressing  6/6/2024 0036 by Christine Green 
  Problem: Discharge Planning  Goal: Discharge to home or other facility with appropriate resources  6/9/2024 2134 by Leah Pichardo RN  Outcome: Progressing  6/9/2024 1355 by Kat Wiggins RN  Outcome: Progressing  Flowsheets (Taken 6/9/2024 1010)  Discharge to home or other facility with appropriate resources: Identify barriers to discharge with patient and caregiver     Problem: Safety - Adult  Goal: Free from fall injury  6/9/2024 2134 by Leah Pichardo RN  Outcome: Progressing  6/9/2024 1355 by Kat Wiggins RN  Outcome: Progressing     Problem: Skin/Tissue Integrity  Goal: Absence of new skin breakdown  Description: 1.  Monitor for areas of redness and/or skin breakdown  2.  Assess vascular access sites hourly  3.  Every 4-6 hours minimum:  Change oxygen saturation probe site  4.  Every 4-6 hours:  If on nasal continuous positive airway pressure, respiratory therapy assess nares and determine need for appliance change or resting period.  6/9/2024 2134 by Leah Pichardo RN  Outcome: Progressing  6/9/2024 1355 by Kat Wiggins RN  Outcome: Progressing     Problem: ABCDS Injury Assessment  Goal: Absence of physical injury  6/9/2024 2134 by Leah Pichardo RN  Outcome: Progressing  6/9/2024 1355 by Kat Wiggins RN  Outcome: Progressing  Flowsheets (Taken 6/9/2024 1018)  Absence of Physical Injury: Implement safety measures based on patient assessment     
  Problem: Discharge Planning  Goal: Discharge to home or other facility with appropriate resources  Outcome: Progressing     Problem: Safety - Adult  Goal: Free from fall injury  6/7/2024 1204 by Humphrey Mcmanus RN  Outcome: Progressing  6/7/2024 0110 by Krystyna Dc RN  Outcome: Not Progressing     Problem: Skin/Tissue Integrity  Goal: Absence of new skin breakdown  Description: 1.  Monitor for areas of redness and/or skin breakdown  2.  Assess vascular access sites hourly  3.  Every 4-6 hours minimum:  Change oxygen saturation probe site  4.  Every 4-6 hours:  If on nasal continuous positive airway pressure, respiratory therapy assess nares and determine need for appliance change or resting period.  6/7/2024 1204 by Humphrey Mcmanus RN  Outcome: Progressing  6/7/2024 0110 by Krystyna Dc RN  Outcome: Progressing     Problem: ABCDS Injury Assessment  Goal: Absence of physical injury  6/7/2024 1204 by Humphrey Mcmanus RN  Outcome: Progressing  6/7/2024 0110 by Krystyna Dc RN  Outcome: Progressing     Problem: Physical Therapy - Adult  Goal: By Discharge: Performs mobility at highest level of function for planned discharge setting.  See evaluation for individualized goals.  Description: FUNCTIONAL STATUS PRIOR TO ADMISSION: Patient was modified independent using a rolling walker for functional mobility.    HOME SUPPORT PRIOR TO ADMISSION: pt admitted from skilled nursing facility. LTC resident for ~10mo    Physical Therapy Goals  Initiated 5/31/2024  Reviewed 6/7/2024  Pt stated goal: \"I'm going to stand\"  Pt will be I with LE HEP in 7 days.  Pt will perform bed mobility with [place] in 7 days.  Pt will perform transfers with Modified [place] in 7 days.   Pt will amb 50-75 feet with LRAD safely with Contact Guard Assist in 7 days.  Pt will demonstrate improvement in standing balance from fair to good in 7 days.     6/7/2024 1001 by Catherine Pak PT  Outcome: Progressing   
  Problem: Discharge Planning  Goal: Discharge to home or other facility with appropriate resources  Outcome: Progressing     Problem: Safety - Adult  Goal: Free from fall injury  Outcome: Progressing     Problem: Skin/Tissue Integrity  Goal: Absence of new skin breakdown  Description: 1.  Monitor for areas of redness and/or skin breakdown  2.  Assess vascular access sites hourly  3.  Every 4-6 hours minimum:  Change oxygen saturation probe site  4.  Every 4-6 hours:  If on nasal continuous positive airway pressure, respiratory therapy assess nares and determine need for appliance change or resting period.  Outcome: Progressing     Problem: ABCDS Injury Assessment  Goal: Absence of physical injury  Outcome: Progressing     Problem: Nutrition Deficit:  Goal: Optimize nutritional status  Outcome: Progressing     
  Problem: Discharge Planning  Goal: Discharge to home or other facility with appropriate resources  Outcome: Progressing     Problem: Safety - Adult  Goal: Free from fall injury  Outcome: Progressing     Problem: Skin/Tissue Integrity  Goal: Absence of new skin breakdown  Description: 1.  Monitor for areas of redness and/or skin breakdown  2.  Assess vascular access sites hourly  3.  Every 4-6 hours minimum:  Change oxygen saturation probe site  4.  Every 4-6 hours:  If on nasal continuous positive airway pressure, respiratory therapy assess nares and determine need for appliance change or resting period.  Outcome: Progressing     Problem: ABCDS Injury Assessment  Goal: Absence of physical injury  Outcome: Progressing  Flowsheets (Taken 6/2/2024 2028 by Coty aMgana RN)  Absence of Physical Injury: Implement safety measures based on patient assessment     Problem: Nutrition Deficit:  Goal: Optimize nutritional status  Outcome: Progressing     Problem: Chronic Conditions and Co-morbidities  Goal: Patient's chronic conditions and co-morbidity symptoms are monitored and maintained or improved  Outcome: Progressing     
  Problem: Discharge Planning  Goal: Discharge to home or other facility with appropriate resources  Outcome: Progressing  Flowsheets (Taken 5/30/2024 2664)  Discharge to home or other facility with appropriate resources:   Identify barriers to discharge with patient and caregiver   Arrange for needed discharge resources and transportation as appropriate   Identify discharge learning needs (meds, wound care, etc)     Problem: Safety - Adult  Goal: Free from fall injury  Outcome: Progressing     Problem: Skin/Tissue Integrity  Goal: Absence of new skin breakdown  Description: 1.  Monitor for areas of redness and/or skin breakdown  2.  Assess vascular access sites hourly  3.  Every 4-6 hours minimum:  Change oxygen saturation probe site  4.  Every 4-6 hours:  If on nasal continuous positive airway pressure, respiratory therapy assess nares and determine need for appliance change or resting period.  Outcome: Progressing     Problem: ABCDS Injury Assessment  Goal: Absence of physical injury  Outcome: Progressing     
  Problem: Discharge Planning  Goal: Discharge to home or other facility with appropriate resources  Outcome: Progressing  Flowsheets (Taken 5/31/2024 0900)  Discharge to home or other facility with appropriate resources: Identify barriers to discharge with patient and caregiver     Problem: Safety - Adult  Goal: Free from fall injury  Outcome: Progressing  Flowsheets (Taken 5/31/2024 0900)  Free From Fall Injury: Instruct family/caregiver on patient safety     Problem: Skin/Tissue Integrity  Goal: Absence of new skin breakdown  Description: 1.  Monitor for areas of redness and/or skin breakdown  2.  Assess vascular access sites hourly  3.  Every 4-6 hours minimum:  Change oxygen saturation probe site  4.  Every 4-6 hours:  If on nasal continuous positive airway pressure, respiratory therapy assess nares and determine need for appliance change or resting period.  Outcome: Progressing     Problem: ABCDS Injury Assessment  Goal: Absence of physical injury  Outcome: Progressing  Flowsheets (Taken 5/31/2024 0900)  Absence of Physical Injury: Implement safety measures based on patient assessment     Problem: Physical Therapy - Adult  Goal: By Discharge: Performs mobility at highest level of function for planned discharge setting.  See evaluation for individualized goals.  Description: FUNCTIONAL STATUS PRIOR TO ADMISSION: Patient was modified independent using a rolling walker for functional mobility.    HOME SUPPORT PRIOR TO ADMISSION: pt admitted from skilled nursing facility. LTC resident for ~10mo    Physical Therapy Goals  Initiated 5/31/2024  Pt stated goal: \"I'm going to stand\"  Pt will be I with LE HEP in 7 days.  Pt will perform bed mobility with [place] in 7 days.  Pt will perform transfers with Modified [place] in 7 days.   Pt will amb 50-75 feet with LRAD safely with Contact Guard Assist in 7 days.  Pt will demonstrate improvement in standing balance from fair to good in 7 days.     5/31/2024 9079 
  Problem: Discharge Planning  Goal: Discharge to home or other facility with appropriate resources  Outcome: Progressing  Flowsheets (Taken 6/5/2024 0856)  Discharge to home or other facility with appropriate resources:   Identify barriers to discharge with patient and caregiver   Arrange for needed discharge resources and transportation as appropriate     Problem: Safety - Adult  Goal: Free from fall injury  6/5/2024 1033 by Sierra Jones RN  Outcome: Progressing  6/5/2024 0304 by Angelia Vasquez RN  Outcome: Progressing     Problem: Skin/Tissue Integrity  Goal: Absence of new skin breakdown  Description: 1.  Monitor for areas of redness and/or skin breakdown  2.  Assess vascular access sites hourly  3.  Every 4-6 hours minimum:  Change oxygen saturation probe site  4.  Every 4-6 hours:  If on nasal continuous positive airway pressure, respiratory therapy assess nares and determine need for appliance change or resting period.  6/5/2024 1033 by Sierra Jones RN  Outcome: Progressing  6/5/2024 0304 by Angelia Vasquez RN  Outcome: Progressing     Problem: ABCDS Injury Assessment  Goal: Absence of physical injury  Outcome: Progressing     Problem: Nutrition Deficit:  Goal: Optimize nutritional status  Outcome: Progressing     Problem: Chronic Conditions and Co-morbidities  Goal: Patient's chronic conditions and co-morbidity symptoms are monitored and maintained or improved  Outcome: Progressing  Flowsheets (Taken 6/5/2024 0856)  Care Plan - Patient's Chronic Conditions and Co-Morbidity Symptoms are Monitored and Maintained or Improved:   Monitor and assess patient's chronic conditions and comorbid symptoms for stability, deterioration, or improvement   Collaborate with multidisciplinary team to address chronic and comorbid conditions and prevent exacerbation or deterioration     
  Problem: Discharge Planning  Goal: Discharge to home or other facility with appropriate resources  Outcome: Progressing  Flowsheets (Taken 6/5/2024 2100)  Discharge to home or other facility with appropriate resources: Identify barriers to discharge with patient and caregiver     Problem: Safety - Adult  Goal: Free from fall injury  Outcome: Progressing  Flowsheets (Taken 6/6/2024 0033)  Free From Fall Injury: Instruct family/caregiver on patient safety     Problem: Skin/Tissue Integrity  Goal: Absence of new skin breakdown  Description: 1.  Monitor for areas of redness and/or skin breakdown  2.  Assess vascular access sites hourly  3.  Every 4-6 hours minimum:  Change oxygen saturation probe site  4.  Every 4-6 hours:  If on nasal continuous positive airway pressure, respiratory therapy assess nares and determine need for appliance change or resting period.  Outcome: Progressing     Problem: ABCDS Injury Assessment  Goal: Absence of physical injury  Outcome: Progressing  Flowsheets (Taken 6/6/2024 0033)  Absence of Physical Injury: Implement safety measures based on patient assessment     Problem: Nutrition Deficit:  Goal: Optimize nutritional status  6/6/2024 0036 by Christine Green, RN  Outcome: Progressing  6/5/2024 1519 by Lyssa Benjamin, RD  Outcome: Not Progressing  Flowsheets (Taken 6/5/2024 1512)  Nutrient intake appropriate for improving, restoring, or maintaining nutritional needs:   Assess nutritional status and recommend course of action   Monitor oral intake, labs, and treatment plans     Problem: Chronic Conditions and Co-morbidities  Goal: Patient's chronic conditions and co-morbidity symptoms are monitored and maintained or improved  Outcome: Progressing  Flowsheets (Taken 6/5/2024 2100)  Care Plan - Patient's Chronic Conditions and Co-Morbidity Symptoms are Monitored and Maintained or Improved: Monitor and assess patient's chronic conditions and comorbid symptoms for stability, deterioration, or 
  Problem: Discharge Planning  Goal: Discharge to home or other facility with appropriate resources  Outcome: Progressing  Flowsheets (Taken 6/8/2024 1040)  Discharge to home or other facility with appropriate resources: Identify barriers to discharge with patient and caregiver     Problem: Safety - Adult  Goal: Free from fall injury  6/8/2024 1308 by Kat Wiggins RN  Outcome: Progressing  6/8/2024 0358 by Nelly Joseph RN  Outcome: Progressing     Problem: Skin/Tissue Integrity  Goal: Absence of new skin breakdown  Description: 1.  Monitor for areas of redness and/or skin breakdown  2.  Assess vascular access sites hourly  3.  Every 4-6 hours minimum:  Change oxygen saturation probe site  4.  Every 4-6 hours:  If on nasal continuous positive airway pressure, respiratory therapy assess nares and determine need for appliance change or resting period.  6/8/2024 1308 by Kat Wiggins RN  Outcome: Progressing  6/8/2024 0358 by Nelly Joseph RN  Outcome: Progressing     Problem: ABCDS Injury Assessment  Goal: Absence of physical injury  Outcome: Progressing  Flowsheets (Taken 6/8/2024 1037)  Absence of Physical Injury: Implement safety measures based on patient assessment     Problem: Nutrition Deficit:  Goal: Optimize nutritional status  Outcome: Progressing     Problem: Chronic Conditions and Co-morbidities  Goal: Patient's chronic conditions and co-morbidity symptoms are monitored and maintained or improved  Outcome: Progressing  Flowsheets (Taken 6/8/2024 1040)  Care Plan - Patient's Chronic Conditions and Co-Morbidity Symptoms are Monitored and Maintained or Improved: Collaborate with multidisciplinary team to address chronic and comorbid conditions and prevent exacerbation or deterioration     
  Problem: Discharge Planning  Goal: Discharge to home or other facility with appropriate resources  Outcome: Progressing  Flowsheets (Taken 6/9/2024 1010)  Discharge to home or other facility with appropriate resources: Identify barriers to discharge with patient and caregiver     Problem: Safety - Adult  Goal: Free from fall injury  6/9/2024 1355 by Kat Wiggins RN  Outcome: Progressing  6/9/2024 0419 by Nelly Joseph RN  Outcome: Progressing     Problem: Skin/Tissue Integrity  Goal: Absence of new skin breakdown  Description: 1.  Monitor for areas of redness and/or skin breakdown  2.  Assess vascular access sites hourly  3.  Every 4-6 hours minimum:  Change oxygen saturation probe site  4.  Every 4-6 hours:  If on nasal continuous positive airway pressure, respiratory therapy assess nares and determine need for appliance change or resting period.  6/9/2024 1355 by Kat Wiggins RN  Outcome: Progressing  6/9/2024 0419 by Nelly Joseph RN  Outcome: Progressing     Problem: ABCDS Injury Assessment  Goal: Absence of physical injury  Outcome: Progressing  Flowsheets (Taken 6/9/2024 1018)  Absence of Physical Injury: Implement safety measures based on patient assessment     Problem: Nutrition Deficit:  Goal: Optimize nutritional status  Outcome: Progressing     Problem: Chronic Conditions and Co-morbidities  Goal: Patient's chronic conditions and co-morbidity symptoms are monitored and maintained or improved  Outcome: Progressing  Flowsheets (Taken 6/9/2024 1010)  Care Plan - Patient's Chronic Conditions and Co-Morbidity Symptoms are Monitored and Maintained or Improved: Monitor and assess patient's chronic conditions and comorbid symptoms for stability, deterioration, or improvement     
  Problem: Nutrition Deficit:  Goal: Optimize nutritional status  6/5/2024 1519 by Lyssa Benjamin RD  Outcome: Not Progressing  Flowsheets (Taken 6/5/2024 1512)  Nutrient intake appropriate for improving, restoring, or maintaining nutritional needs:   Assess nutritional status and recommend course of action   Monitor oral intake, labs, and treatment plans  6/5/2024 1033 by Sierra Jones, RN  Outcome: Progressing     
  Problem: Nutrition Deficit:  Goal: Optimize nutritional status  Outcome: Progressing  Flowsheets (Taken 5/31/2024 5399)  Nutrient intake appropriate for improving, restoring, or maintaining nutritional needs:   Assess nutritional status and recommend course of action   Monitor oral intake, labs, and treatment plans     
  Problem: Safety - Adult  Goal: Free from fall injury  6/7/2024 1204 by Humphrey Mcmanus RN  Outcome: Progressing  6/7/2024 0110 by Krystyna Dc RN  Outcome: Not Progressing     Problem: Nutrition Deficit:  Goal: Optimize nutritional status  6/7/2024 1312 by Marcelle Ace RD  Outcome: Not Progressing  Flowsheets  Taken 6/7/2024 1312  Nutrient intake appropriate for improving, restoring, or maintaining nutritional needs:   Assess nutritional status and recommend course of action   Recommend appropriate diets, oral nutritional supplements, and vitamin/mineral supplements   Monitor oral intake, labs, and treatment plans  Taken 6/7/2024 1300  Nutrient intake appropriate for improving, restoring, or maintaining nutritional needs:   Assess nutritional status and recommend course of action   Monitor oral intake, labs, and treatment plans   Recommend appropriate diets, oral nutritional supplements, and vitamin/mineral supplements  Note: Pt continues with suboptimal po intake. Despite nutrition interventions  6/7/2024 1204 by Humphrey Mcmanus RN  Outcome: Progressing  6/7/2024 0110 by Krystyna Dc RN  Outcome: Not Progressing     
Caregiver called to speak with patient, no concerns/ questions from caregiver at this time. Would like to be called prior to discharge. Patient bed alarm on and alarms audible. Patient did make several attempts to get OOB unassisted, redirected multiple times. Patient took all PO night meds without any issues, ate 75% of meal when prompted by staff, needed a lot of encouragement.      Problem: Safety - Adult  Goal: Free from fall injury  Outcome: Progressing     Problem: Skin/Tissue Integrity  Goal: Absence of new skin breakdown  Description: 1.  Monitor for areas of redness and/or skin breakdown  2.  Assess vascular access sites hourly  3.  Every 4-6 hours minimum:  Change oxygen saturation probe site  4.  Every 4-6 hours:  If on nasal continuous positive airway pressure, respiratory therapy assess nares and determine need for appliance change or resting period.  Outcome: Progressing     
Problem: Physical Therapy - Adult  Goal: By Discharge: Performs mobility at highest level of function for planned discharge setting.  See evaluation for individualized goals.  Description: FUNCTIONAL STATUS PRIOR TO ADMISSION: Patient was modified independent using a rolling walker for functional mobility.    HOME SUPPORT PRIOR TO ADMISSION: pt admitted from skilled nursing facility. LTC resident for ~10mo    Physical Therapy Goals  Initiated 5/31/2024  Reviewed 6/7/2024  Pt stated goal: \"I'm going to stand\"  Pt will be I with LE HEP in 7 days.  Pt will perform bed mobility with [place] in 7 days.  Pt will perform transfers with Modified [place] in 7 days.   Pt will amb 50-75 feet with LRAD safely with Contact Guard Assist in 7 days.  Pt will demonstrate improvement in standing balance from fair to good in 7 days.     Outcome: Progressing            PHYSICAL THERAPY TREATMENT     Patient: Kvng Arellano (64 y.o. male)  Date: 6/9/2024  Diagnosis: Community acquired bacterial pneumonia [J15.9]  Acute renal failure, unspecified acute renal failure type (HCC) [N17.9]  Multifocal pneumonia [J18.9]  Nausea and vomiting, unspecified vomiting type [R11.2] Community acquired bacterial pneumonia  Procedure(s) (LRB):  ESOPHAGOGASTRODUODENOSCOPY (N/A) 6 Days Post-Op  Precautions: Bed Alarm, Fall Risk                      Recommendations for nursing mobility: Encourage HEP in prep for ADLs/mobility; see handout for details, Frequent repositioning to prevent skin breakdown, Use of bed/chair alarm for safety, Assist x1, and Assist x2    In place during session: Peripheral IV and External Catheter  Chart, physical therapy assessment, plan of care and goals were reviewed.  ASSESSMENT  Patient continues with skilled PT services and is progressing towards goals. Pt received semi supine in bed upon PTA's arrival, agreeable to session. Pt A&O x 1. Pt declined OOB mobility, but he completed supine LE ex. With encouragement 
Speech LAnguage Pathology Dysphagia EVALUATION    Patient: Kvng Arellano (64 y.o. male)  Date: 6/1/2024  Primary Diagnosis: Community acquired bacterial pneumonia [J15.9]  Acute renal failure, unspecified acute renal failure type (HCC) [N17.9]  Multifocal pneumonia [J18.9]  Nausea and vomiting, unspecified vomiting type [R11.2]       Precautions: GERD and aspiration, Bed Alarm, Fall Risk                DIET RECOMMENDATIONS: Soft and bite sized and thin liquids    SWALLOW SAFETY PRECAUTIONS: Upright positioning during po intake and after po intake for at least 30-60 minutes, slow rate, small-single sips and bites   ASSESSMENT :  Patient seen repositioned upright, alert and intermittently cooperative. Flat affect and fleeting eye contact  Upon inspection caries, missing dentition  PO trials: regular solids, puree, thin liquids via cup (consecutive sips and single sips)  Based on the objective data described below, the patient presents with mild oropharyngeal dysphagia characterized by the following  Oral phase: prolonged mastication, consecutive sips with initial presentation with subsequent cough x1, with single sips overt s/sx of aspiration eliminated, mildly disorganized bolus control  Pharyngeal phase: swallow initiation min delayed and HLE WFL upon palpation  Overt s/sx of aspiration/penetration:consecutive sips with initial presentation with subsequent cough x1    Concerns: impulsivity negatively impacts swallow safety and function    F2F case conference with Stepan BALDERAS concerning diet initiation with consent received for initiation of diet. Dr. Varela also agreeable to initiation of diet.      Problem: SLP Adult - Impaired Swallowing  Goal: By Discharge: Advance to least restrictive diet without signs or symptoms of aspiration for planned discharge setting.  See evaluation for individualized goals.  Description: Speech Therapy Swallow Goals    Initiated 6/1/2024    -Patient stated goal: 
Writer reminded patient to call for assistance and not to get OOB without calling for help. Patient needs reminders due to forgetfulness. Bed alarm and fall socks in place. [place] in place to prevent the need to get OOB without calling for help. No cough or SOB noted at this time. No stool noted at this time. Reminded patient to call nurse for assistance. Will continue to monitor for safety. No further concerns at this time.   Problem: Safety - Adult  Goal: Free from fall injury  Outcome: Not Progressing     Problem: Nutrition Deficit:  Goal: Optimize nutritional status  Outcome: Not Progressing     
    SUBJECTIVE:   Patient stated “I'll try.”     OBJECTIVE DATA SUMMARY:   Critical Behavior:  Orientation  Overall Orientation Status: Impaired  Orientation Level: Oriented to person  Cognition  Overall Cognitive Status: Exceptions  Arousal/Alertness: Delayed responses to stimuli  Following Commands: Follows one step commands with repetition;Follows one step commands with increased time  Attention Span: Difficulty attending to directions  Safety Judgement: Impaired;Decreased awareness of need for assistance;Decreased awareness of need for safety  Problem Solving: Decreased awareness of errors;Assistance required to correct errors made;Assistance required to implement solutions;Assistance required to generate solutions;Assistance required to identify errors made  Insights: Decreased awareness of deficits  Initiation: Requires cues for all  Sequencing: Requires cues for all    Functional Mobility Training:  Bed Mobility:  Bed Mobility Training  Bed Mobility Training: Yes  Interventions: Safety awareness training;Verbal cues;Visual cues  Supine to Sit: Minimum assistance;Additional time;Assist X1  Scooting: Minimum assistance;Assist X1  Transfers:  Transfer Training  Transfer Training: Yes  Interventions: Verbal cues;Demonstration;Manual cues;Safety awareness training;Weight shifting training/pressure relief;Visual cues;Tactile cues  Sit to Stand: Maximum assistance;Assist X1;Adaptive equipment  Stand to Sit: Maximum assistance;Assist X1;Adaptive equipment  Stand Pivot Transfers: Maximum assistance;Assist X1;Additional time;Adaptive equipment  Bed to Chair: Maximum assistance;Assist X1;Additional time;Adaptive equipment  Balance:  Balance  Sitting: Impaired  Sitting - Static: Fair (occasional)  Sitting - Dynamic: Poor (constant support)  Standing: Impaired  Standing - Static: Poor;Constant support  Standing - Dynamic: Poor;Constant support  Wheelchair Mobility:     Ambulation/Gait Training:                           
disease     Diabetes (HCC)     High cholesterol     HTN (hypertension)      Past Surgical History:   Procedure Laterality Date    HERNIA REPAIR         Home Situation:  Social/Functional History  Lives With: Other (comment) (Facility)  Type of Home: Facility (Adele Mcfadden gave verbal consent via phone toreturn to [facility]/Referral.)  Home Layout: One level  Home Access: Level entry  Bathroom Shower/Tub: Walk-in shower  Bathroom Toilet: Handicap height  Bathroom Equipment: Grab bars in shower  Bathroom Accessibility: Wheelchair accessible  Home Equipment: None  Receives Help From: Other (comment) (Facility)  ADL Assistance: Needs assistance  Toileting: Independent  Homemaking Assistance: Needs assistance  Homemaking Responsibilities: Yes  Ambulation Assistance: Independent  Transfer Assistance: Independent  Active : No  Occupation: On disability    Cognitive/Behavioral Status:  Orientation  Orientation Level: Oriented to person;Oriented to time  Cognition  Overall Cognitive Status: Exceptions  Arousal/Alertness: Delayed responses to stimuli  Following Commands: Follows one step commands with repetition;Follows multistep commands with repitition;Follows multistep commands with increased time;Follows one step commands with increased time  Attention Span: Difficulty attending to directions  Safety Judgement: Good awareness of safety precautions;Decreased awareness of need for assistance  Problem Solving: Decreased awareness of errors  Insights: Decreased awareness of deficits  Initiation: Requires cues for some  Sequencing: Requires cues for some    Skin: intact    Edema: none noted    Strength:    Strength: Generally decreased, functional (BUE/BLE grossly 4/5)    Range Of Motion:  AROM: Generally decreased, functional       Coordination:        Tone & Sensation:             Functional Mobility:  Bed Mobility:     Bed Mobility Training  Bed Mobility Training: Yes  Interventions: Verbal cues  Rolling: Stand-by 
exercise/activities with [place] within 7 day(s).    7.  Patient will utilize energy conservation techniques during functional activities with verbal cues within 7 day(s).  6/7/2024 1004 by Fatoumata Reynolds OT  Outcome: Progressing        PLAN  Recommendations and Planned Interventions:   self care training, therapeutic activities, functional mobility training, balance training, therapeutic exercise, endurance activities, patient education, and home safety training    Frequency/Duration: 3-5x/week    Recommend next OT session: Toileting    Recommendation for discharge: (in order for the patient to meet his/her long term goals): Therapy up to 5 days/week in Skilled nursing facility    IF patient discharges home will need the following DME: continuing to assess with progress     SUBJECTIVE:   Patient stated “I'm dizzy.”    OBJECTIVE DATA SUMMARY:   Cognitive/Behavioral Status:  Orientation  Overall Orientation Status: Impaired  Orientation Level: Oriented to person  Cognition  Overall Cognitive Status: Exceptions  Arousal/Alertness: Delayed responses to stimuli  Following Commands: Follows one step commands with repetition;Follows one step commands with increased time  Attention Span: Difficulty attending to directions  Memory: Impaired  Safety Judgement: Impaired;Decreased awareness of need for assistance;Decreased awareness of need for safety  Problem Solving: Decreased awareness of errors;Assistance required to correct errors made;Assistance required to implement solutions;Assistance required to generate solutions;Assistance required to identify errors made  Insights: Decreased awareness of deficits  Sequencing: Requires cues for some    Functional Mobility and Transfers for ADLs:  Bed Mobility:  Bed Mobility Training  Bed Mobility Training: Yes  Interventions: Verbal cues;Safety awareness training;Visual cues  Rolling: Modified independent  Supine to Sit: Minimum assistance;Assist X1  Scooting: Maximum 
Status:  Orientation  Orientation Level: Oriented to person;Oriented to time  Cognition  Overall Cognitive Status: Exceptions  Arousal/Alertness: Delayed responses to stimuli  Following Commands: Follows one step commands with repetition;Follows multistep commands with repitition;Follows multistep commands with increased time;Follows one step commands with increased time  Attention Span: Difficulty attending to directions  Safety Judgement: Good awareness of safety precautions;Decreased awareness of need for assistance  Problem Solving: Decreased awareness of errors  Insights: Decreased awareness of deficits  Initiation: Requires cues for some  Sequencing: Requires cues for some        Range of Motion:     AROM: Within functional limits       Strength:    Strength: Generally decreased, functional    Coordination:  Coordination: Within functional limits            Tone & Sensation:     Tone: Normal         Functional Mobility and Transfers for ADLs:  Bed Mobility:  Bed Mobility Training  Bed Mobility Training: Yes  Interventions: Verbal cues  Rolling: Stand-by assistance;Additional time  Supine to Sit: Contact-guard assistance;Additional time  Scooting: Contact-guard assistance;Additional time    Transfers:  Transfer Training  Transfer Training: Yes  Interventions: Verbal cues;Safety awareness training  Sit to Stand: Minimum assistance;Assist X1;Assist X2  Stand to Sit: Minimum assistance;Assist X2  Bed to Chair: Minimum assistance;Assist X2      Balance:  Balance  Sitting: Intact  Standing: Impaired  Standing - Static: Fair;Constant support  Standing - Dynamic: Poor;Constant support      ADL Assessment:     Grooming: Setup  Grooming Skilled Clinical Factors: Washing face while seated in chair      Therapeutic Exercises:   Pt would benefit from UE HEP to increase strength and endurance in prep for ADLs and functional transfers/mobility.    Functional Measure:    [facility] AM-PACTM \"6 Clicks\"                   
  [] 2   [] 3   [x] 4   2.  Sitting down on and standing up from a chair with arms ( e.g., wheelchair, bedside commode, etc.)   [] 1   [x] 2   [] 3   [] 4   3.  Moving from lying on back to sitting on the side of the bed?   [] 1   [x] 2   [] 3   [] 4          How much help from another person does the patient currently need... Total A Lot A Little None   4.  Moving to and from a bed to a chair (including a wheelchair)?   [] 1   [x] 2   [] 3   [] 4   5.  Need to walk in hospital room?   [] 1   [x] 2   [] 3   [] 4   6.  Climbing 3-5 steps with a railing?   [] 1   [x] 2   [] 3   [] 4   © , Trustees of [facility], under license to [employer]. All rights reserved     Score:  Initial:  Most Recent: (Date: 2024 )   Interpretation of Tool:  Represents activities that are increasingly more difficult (i.e. Bed mobility, Transfers, Gait).  Score 24 23 22-20 19-15 14-10 9-7 6   Modifier CH CI CJ CK CL CM CN     Pain Ratin/10 none reported  Pain Intervention(s):       Activity Tolerance:   Fair  and requires frequent rest breaks    After treatment patient left in no apparent distress:   Bed locked and in lowest position Patient left in no apparent distress sitting up in chair, Call bell within reach, and Bed/ chair alarm activated and nsg updated.    COMMUNICATION/EDUCATION:   The patient’s plan of care was discussed with: Occupational therapist and Registered nurse    Patient Education  Education Given To: Patient  Education Provided: Role of Therapy;Plan of Care;Transfer Training;Home Exercise Program  Education Method: Verbal;Demonstration;Teach Back  Barriers to Learning: Cognition  Education Outcome: Verbalized understanding;Continued education needed;Demonstrated understanding    PT/OT sessions occurred together for increased safety of pt and clinician.    Thank you for this referral.  Catherine Pak, PT  Minutes: 30

## 2024-06-10 NOTE — DISCHARGE SUMMARY
Discharge Summary    Name: Kvng Arellano  [id number]  YOB: 1960 (Age: 64 y.o.)   Date of Admission: 5/30/2024  Date of Discharge: 6/10/2024  Attending Physician: Adam Yan MD    Discharge Diagnosis:   Principal Problem:    Community acquired bacterial pneumonia  Active Problems:    Acute renal failure (HCC)    Hypoxia    Failure to thrive in adult    Hypernatremia    Metabolic acidosis    Protein calorie malnutrition (HCC)  Resolved Problems:    * No resolved hospital problems. *       Consultations:  IP CONSULT TO NEPHROLOGY  IP CONSULT TO GI  IP CONSULT TO DIETITIAN  IP CONSULT TO CASE MANAGEMENT  IP CONSULT TO SOCIAL WORK  IP CONSULT TO HOSPICE  IP CONSULT TO SPIRITUAL CARE  IP CONSULT TO ETHICS  IP CONSULT TO PSYCHIATRY  IP CONSULT TO PSYCHIATRY      Brief Admission History/Brief Hospital Course by Main Problems:     Kvng Arellano is a 64-year-old male with a PMHx of type II DM, hypertension, diastolic heart failure, stroke, HLD, CKD stage III, CVA and dementia who was admitted on 5/30/2024 from nursing home with coffee-ground emesis.  CT of the chest revealed bilateral multifocal groundglass opacities, suspected aspiration.  Patient was initiated on IV Rocephin and azithromycin after blood cultures obtained.  Also, patient noted to have JACQUE so was initiated on IV fluids with nephrology consult.  Hemoglobin dropped with bright red heme hematemesis.  Antiplatelet held and GI consult placed.  GI evaluated patient and feels this is most likely secondary to Karla-Pan tear, recommends considering EGD, but delayed due to concerns with consent.  Patient's caregiver requested a goals of care discussion, but patient has no living relatives as power of .  Psych evaluated patient and deemed patient able to make his own medical decisions.  Patient developed persistent metabolic acidosis. Nephrology initiated bicarb drip, but patient lost IV access so

## 2024-06-10 NOTE — CARE COORDINATION
Transition of Care Plan:    RUR: 19%  Prior Level of Functioning: SNF  Disposition: SNF  If SNF or IPR: Date FOC offered: 5/30  Date FOC received: 5/30  Accepting facility: [facility]   Date authorization started with reference number: 6/7 #[id number]  Date authorization received and expires: 6/10  Follow up appointments: to be scheduled at SNF  DME needed: at facility   Transportation at discharge: Hospital to Home  IM/IMM Medicare/ letter given: yes, 6/10  Is patient a [place] and connected with [facility]?    If yes, was [place] transfer form completed and [facility] notified?   Caregiver Contact: notified nursing facility as well as caregiver Adele Mcfadden at [phone]   Discharge Caregiver contacted prior to discharge? yes  Care Conference needed? no  Barriers to discharge:  none    Patient has been accepted to [facility]. He will go to Room 327A and nursing report can be called to [phone].

## 2024-06-10 NOTE — CARE COORDINATION
[facility] has insurance auth for patient. He can return to [facility] today. Cm waiting on room assignment. Spoke with patient's caregiver regarding transportation and she stated he uses medicaid transport. CM reviewed chart and there is no medicaid on file. Reached out to SNF to see if they have that info. Awaiting response. Patient does not qualify for medical transport.

## 2024-06-10 NOTE — DISCHARGE INSTR - COC
Continuity of Care Form    Patient Name: Kvng Arellano   :  1960  MRN:  [id number]    Admit date:  2024  Discharge date:  6/10/24    Code Status Order: Full Code   Advance Directives:     Admitting Physician:  Saroj Avalos MD  PCP: Karen Samano APRN - NP    Discharging Nurse: Denise Taylor  Discharging Hospital Unit/Room#: 570/01  Discharging Unit Phone Number: [phone]    Emergency Contact:   Extended Emergency Contact Information  Primary Emergency Contact: Adele Mcfadden  Mobile Phone: [phone]  Relation: Lay Caregiver    Past Surgical History:  Past Surgical History:   Procedure Laterality Date    HERNIA REPAIR         Immunization History:   Immunization History   Administered Date(s) Administered    COVID-19, MODERNA BLUE border, Primary or Immunocompromised, (age 12y+), IM, 100 mcg/0.5mL 2021    Influenza, Triv, 3 Years and older, IM (Afluria (5 yrs and older) 2022    TD 5LF, TENIVAC, (age 7y+), IM, 0.5mL 2024       Active Problems:  Patient Active Problem List   Diagnosis Code    Hyperlipidemia E78.5    Chronic diastolic CHF (congestive heart failure) (HCC) I50.32    Primary hypertension I10    Vitamin D deficiency E55.9    Community acquired bacterial pneumonia J15.9    Acute renal failure (HCC) N17.9    Hypoxia R09.02    Failure to thrive in adult R62.7    Hypernatremia E87.0    Metabolic acidosis E87.20    Protein calorie malnutrition (HCC) E46       Isolation/Infection:   Isolation            No Isolation          Patient Infection Status       None to display            Nurse Assessment:  Last Vital Signs: BP (!) 146/78   Pulse 77   Temp 97.5 °F (36.4 °C) (Oral)   Resp 17   Ht 1.727 m (5' 7.99\")   Wt 54.4 kg (120 lb)   SpO2 98%   BMI 18.25 kg/m²     Last documented pain score (0-10 scale): Pain Level: 0  Last Weight:   Wt Readings from Last 1 Encounters:   24 54.4 kg (120 lb)     Mental Status:  oriented and self only    IV Access:  -

## 2024-06-10 NOTE — PROGRESS NOTES
Nephrology follow-up          Patient: Kvng Arellano MRN: [id number]  SSN: [id number]    YOB: 1960  Age: 64 y.o.  Sex: male      Subjective:   I have seen patient at the bedside  He looks more comfortable and calm today  Elevated blood pressures  Afebrile  Resolved leukocytosis  Resolving JACQUE  On room air now  Good urine output  Past Medical History:   Diagnosis Date    CVS disease     Diabetes (HCC)     High cholesterol     HTN (hypertension)      Past Surgical History:   Procedure Laterality Date    HERNIA REPAIR        History reviewed. No pertinent family history.  Social History     Tobacco Use    Smoking status: Never    Smokeless tobacco: Never   Substance Use Topics    Alcohol use: Never      Current Facility-Administered Medications   Medication Dose Route Frequency Provider Last Rate Last Admin    hydrALAZINE (APRESOLINE) tablet 25 mg  25 mg Oral 2 times per day Álvaro Herron PA-C   25 mg at 06/01/24 0851    0.45 % sodium chloride infusion   IntraVENous Continuous Álvaro Herron PA-C 100 mL/hr at 06/01/24 0840 New Bag at 06/01/24 0840    prochlorperazine (COMPAZINE) injection 10 mg  10 mg IntraVENous Q6H PRN Álvaro Herron PA-C        guaiFENesin (MUCINEX) extended release tablet 600 mg  600 mg Oral BID Álvaro Herron PA-C   600 mg at 06/01/24 0852    budesonide-formoterol (SYMBICORT) 160-4.5 MCG/ACT inhaler 2 puff  2 puff Inhalation BID RT Álvaro Herron PA-C   2 puff at 06/01/24 0904    hydrALAZINE (APRESOLINE) injection 10 mg  10 mg IntraVENous Q6H PRN Álvaro Herron PA-C   10 mg at 06/01/24 0850    ipratropium 0.5 mg-albuterol 2.5 mg (DUONEB) nebulizer solution 1 Dose  1 Dose Inhalation Q6H WA RT Álvaro Herron PA-C   1 Dose at 06/01/24 0902    pantoprazole (PROTONIX) injection 40 mg  40 mg IntraVENous BID Mac Varela MD   40 mg at 06/01/24 0852    amLODIPine (NORVASC) tablet 10 mg  10 mg Oral Daily Saroj Avalos MD   
         Nephrology follow-up          Patient: Kvng Arellano MRN: [id number]  SSN: [id number]    YOB: 1960  Age: 64 y.o.  Sex: male      Subjective:   I have seen patient at the bedside  He said feeling better today  On RA  Blood pressures are normal  Afebrile  Resolved leukocytosis  Resolving JACQUE  Urine output is better  Bladder scan no evidence of urinary retention    Past Medical History:   Diagnosis Date    CVS disease     Diabetes (HCC)     High cholesterol     HTN (hypertension)      Past Surgical History:   Procedure Laterality Date    HERNIA REPAIR        History reviewed. No pertinent family history.  Social History     Tobacco Use    Smoking status: Never    Smokeless tobacco: Never   Substance Use Topics    Alcohol use: Never      Current Facility-Administered Medications   Medication Dose Route Frequency Provider Last Rate Last Admin    potassium chloride (KLOR-CON M) extended release tablet 40 mEq  40 mEq Oral Once Fatmata Monroy MD        0.45 % sodium chloride infusion   IntraVENous Continuous Fatmata Monroy  mL/hr at 06/08/24 2130 New Bag at 06/08/24 2130    mirtazapine (REMERON SOL-TAB) disintegrating tablet 7.5 mg  7.5 mg Oral Nightly Cam Menjivar MD   7.5 mg at 06/08/24 2147    furosemide (LASIX) injection 40 mg  40 mg IntraVENous Once Fatmata Monroy MD        piperacillin-tazobactam (ZOSYN) 3,375 mg in sodium chloride 0.9 % 50 mL IVPB (mini-bag)  3,375 mg IntraVENous Q12H Shailesh Wright PA-C 12.5 mL/hr at 06/09/24 0856 3,375 mg at 06/09/24 0856    sodium bicarbonate tablet 650 mg  650 mg Oral TID WC Fatmata Monroy MD   650 mg at 06/09/24 0856    hydrALAZINE (APRESOLINE) tablet 100 mg  100 mg Oral TID Fatmata Monroy MD   100 mg at 06/09/24 0854    ipratropium 0.5 mg-albuterol 2.5 mg (DUONEB) nebulizer solution 1 Dose  1 Dose Inhalation Q4H PRN William Borjas MD        prochlorperazine (COMPAZINE) injection 10 mg  10 mg IntraVENous Q6H PRN Álvaro Herron PA-C 
         Nephrology follow-up          Patient: Kvng Arellano MRN: [id number]  SSN: [id number]    YOB: 1960  Age: 64 y.o.  Sex: male      Subjective:   I have seen patient at the bedside  He looks more comfortable and calm today  Elevated blood pressures  Afebrile  Resolved leukocytosis  Resolving JACQUE  On room air now  Good urine output  Past Medical History:   Diagnosis Date    CVS disease     Diabetes (HCC)     High cholesterol     HTN (hypertension)      Past Surgical History:   Procedure Laterality Date    HERNIA REPAIR        History reviewed. No pertinent family history.  Social History     Tobacco Use    Smoking status: Never    Smokeless tobacco: Never   Substance Use Topics    Alcohol use: Never      Current Facility-Administered Medications   Medication Dose Route Frequency Provider Last Rate Last Admin    ipratropium 0.5 mg-albuterol 2.5 mg (DUONEB) nebulizer solution 1 Dose  1 Dose Inhalation Q4H PRN William Borjas MD        prochlorperazine (COMPAZINE) injection 10 mg  10 mg IntraVENous Q6H PRN Álvaro Herron PA-C   10 mg at 06/01/24 1554    hydrALAZINE (APRESOLINE) tablet 25 mg  25 mg Oral TID Fatmata Monroy MD   25 mg at 06/02/24 0840    dextrose 5 % and 0.2 % NaCl infusion   IntraVENous Continuous Fatmata Monroy  mL/hr at 06/02/24 0150 New Bag at 06/02/24 0150    ferrous sulfate (IRON 325) tablet 325 mg  325 mg Oral BID Fatmata Monroy MD   325 mg at 06/02/24 0840    guaiFENesin (MUCINEX) extended release tablet 600 mg  600 mg Oral BID Álvaro Herron PA-C   600 mg at 06/02/24 0840    budesonide-formoterol (SYMBICORT) 160-4.5 MCG/ACT inhaler 2 puff  2 puff Inhalation BID RT Álvaro Herron PA-C   2 puff at 06/02/24 0814    hydrALAZINE (APRESOLINE) injection 10 mg  10 mg IntraVENous Q6H PRN Álvaro Herron PA-C   10 mg at 06/01/24 0850    pantoprazole (PROTONIX) injection 40 mg  40 mg IntraVENous BID Mac Varela MD   40 mg at 06/02/24 0840    
         Nephrology follow-up          Patient: Kvng Arellano MRN: [id number]  SSN: [id number]    YOB: 1960  Age: 64 y.o.  Sex: male      Subjective:   I have seen patient at the bedside  He looks comfortable today  Weaning down on his nasal cannula O2  Blood pressures are normal to elevated  Received IV Lasix 40 mg x 1 dose yesterday  Afebrile  Resolved leukocytosis  Worse JACQUE  Resolved hypoglycemia  Past Medical History:   Diagnosis Date    CVS disease     Diabetes (HCC)     High cholesterol     HTN (hypertension)      Past Surgical History:   Procedure Laterality Date    HERNIA REPAIR        History reviewed. No pertinent family history.  Social History     Tobacco Use    Smoking status: Never    Smokeless tobacco: Never   Substance Use Topics    Alcohol use: Never      Current Facility-Administered Medications   Medication Dose Route Frequency Provider Last Rate Last Admin    mirtazapine (REMERON SOL-TAB) disintegrating tablet 7.5 mg  7.5 mg Oral Nightly Cam Menjivar MD   7.5 mg at 06/04/24 2125    furosemide (LASIX) injection 40 mg  40 mg IntraVENous Once Fatmata Monroy MD        piperacillin-tazobactam (ZOSYN) 3,375 mg in sodium chloride 0.9 % 50 mL IVPB (mini-bag)  3,375 mg IntraVENous Q12H Shailesh Wright PA-C   Stopped at 06/05/24 1300    dextrose 10 % infusion   IntraVENous Continuous Fatmata Monroy MD 30 mL/hr at 06/04/24 2201 New Bag at 06/04/24 2201    sodium bicarbonate tablet 650 mg  650 mg Oral TID WC Fatmata Monroy MD   650 mg at 06/05/24 1606    hydrALAZINE (APRESOLINE) tablet 100 mg  100 mg Oral TID Fatmata Monroy MD   100 mg at 06/05/24 1606    ipratropium 0.5 mg-albuterol 2.5 mg (DUONEB) nebulizer solution 1 Dose  1 Dose Inhalation Q4H PRN William Borjas MD        prochlorperazine (COMPAZINE) injection 10 mg  10 mg IntraVENous Q6H PRN Álvaro Herron PA-C   10 mg at 06/01/24 1554    ferrous sulfate (IRON 325) tablet 325 mg  325 mg Oral BID Fatmata Monroy MD   325 
         Nephrology follow-up          Patient: Kvng Arellano MRN: [id number]  SSN: [id number]    YOB: 1960  Age: 64 y.o.  Sex: male      Subjective:   I have seen patient at the bedside  He said not feeling better today and did not want to eat his breakfast  On RA  Blood pressures are normal  Afebrile  Resolved leukocytosis  Resolving JACQUE  Urine output is better  Bladder scan no evidence of urinary retention  On IV fluids    Past Medical History:   Diagnosis Date    CVS disease     Diabetes (HCC)     High cholesterol     HTN (hypertension)      Past Surgical History:   Procedure Laterality Date    HERNIA REPAIR        History reviewed. No pertinent family history.  Social History     Tobacco Use    Smoking status: Never    Smokeless tobacco: Never   Substance Use Topics    Alcohol use: Never      Current Facility-Administered Medications   Medication Dose Route Frequency Provider Last Rate Last Admin    0.45 % sodium chloride infusion   IntraVENous Continuous Fatmata Monroy  mL/hr at 06/10/24 0711 New Bag at 06/10/24 0711    mirtazapine (REMERON SOL-TAB) disintegrating tablet 7.5 mg  7.5 mg Oral Nightly Cam Menjivar MD   7.5 mg at 06/09/24 2046    furosemide (LASIX) injection 40 mg  40 mg IntraVENous Once Fatmata Monroy MD        piperacillin-tazobactam (ZOSYN) 3,375 mg in sodium chloride 0.9 % 50 mL IVPB (mini-bag)  3,375 mg IntraVENous Q12H Shailesh Wright PA-C 12.5 mL/hr at 06/10/24 0912 3,375 mg at 06/10/24 0912    sodium bicarbonate tablet 650 mg  650 mg Oral TID WC Fatmata Monroy MD   650 mg at 06/09/24 1708    hydrALAZINE (APRESOLINE) tablet 100 mg  100 mg Oral TID Fatmata Monroy MD   100 mg at 06/09/24 2046    ipratropium 0.5 mg-albuterol 2.5 mg (DUONEB) nebulizer solution 1 Dose  1 Dose Inhalation Q4H PRN William Borjas MD        prochlorperazine (COMPAZINE) injection 10 mg  10 mg IntraVENous Q6H PRN Álvaro Herron PA-C   10 mg at 06/01/24 1554    ferrous sulfate (IRON 
         Nephrology follow-up          Patient: Kvng Arellano MRN: [id number]  SSN: [id number]    YOB: 1960  Age: 64 y.o.  Sex: male      Subjective:   I have seen patient at the bedside  He looks comfortable today  On RA  Blood pressures are normal  Afebrile  Resolved leukocytosis  Worse JACQUE  Resolved hypoglycemia  Past Medical History:   Diagnosis Date    CVS disease     Diabetes (HCC)     High cholesterol     HTN (hypertension)      Past Surgical History:   Procedure Laterality Date    HERNIA REPAIR        History reviewed. No pertinent family history.  Social History     Tobacco Use    Smoking status: Never    Smokeless tobacco: Never   Substance Use Topics    Alcohol use: Never      Current Facility-Administered Medications   Medication Dose Route Frequency Provider Last Rate Last Admin    mirtazapine (REMERON SOL-TAB) disintegrating tablet 7.5 mg  7.5 mg Oral Nightly Cam Menjivar MD   7.5 mg at 06/05/24 2233    furosemide (LASIX) injection 40 mg  40 mg IntraVENous Once Fatmata Monroy MD        piperacillin-tazobactam (ZOSYN) 3,375 mg in sodium chloride 0.9 % 50 mL IVPB (mini-bag)  3,375 mg IntraVENous Q12H Shailesh Wright PA-C   Stopped at 06/06/24 1406    sodium bicarbonate tablet 650 mg  650 mg Oral TID WC Fatmata Monroy MD   650 mg at 06/06/24 1154    hydrALAZINE (APRESOLINE) tablet 100 mg  100 mg Oral TID Fatmata Monroy MD   100 mg at 06/06/24 1440    ipratropium 0.5 mg-albuterol 2.5 mg (DUONEB) nebulizer solution 1 Dose  1 Dose Inhalation Q4H PRN William Borjas MD        prochlorperazine (COMPAZINE) injection 10 mg  10 mg IntraVENous Q6H PRN Álvaro Herron PA-C   10 mg at 06/01/24 1554    ferrous sulfate (IRON 325) tablet 325 mg  325 mg Oral BID Fatmata Monroy MD   325 mg at 06/06/24 0955    guaiFENesin (MUCINEX) extended release tablet 600 mg  600 mg Oral BID Álvaro Herron PA-C   600 mg at 06/06/24 0955    budesonide-formoterol (SYMBICORT) 160-4.5 MCG/ACT inhaler 2 
      Hospitalist Progress Note    NAME:   Kvng Arellano   : 1960   MRN: [id number]     Date/Time: 2024 9:38 AM  Patient PCP: Karen Samano APRN - NP    Estimated discharge date: 24 hours  Barriers: IV antibiotics, CXR, IV fluids, nephrology clearance    Hospital course:  Patient is a 64-year-old male with a past medical history of type II DM, hypertension, diastolic heart failure, stroke, HLD, CKD stage III, CVA and dementia who was admitted on 2024 from nursing home with coffee-ground emesis.  CT of the chest revealed bilateral multifocal groundglass opacities, suspected aspiration.  Patient was initiated on IV Rocephin and azithromycin after blood cultures obtained.  Also, patient noted to have JACQUE so was initiated on IV fluids with nephrology consult.  Hemoglobin dropped with bright red heme hematemesis.  Antiplatelet held and GI consult placed.  GI evaluated patient and feels this is most likely secondary to Krala-Pan tear, recommends considering EGD on Monday or Tuesday, but delayed due to concerns with consent.  Patient has caregiver at bedside and requesting for goals of care discussion, but patient has no living relatives as power of .  Psych evaluated patient this morning and feels patient is able to make medical decisions at this time so case management will attempt to get proper paperwork to caregiver to make medical decisions.  Also, patient lost IV access so we will need either midline or PICC placed.  Patient has experienced persistent metabolic acidosis so nephrology initiated bicarb drip, but patient has not received medication due to lack of IV access. Repeat CXR on 2024 revealed ill-defined patchy, lobar densities appear increased compared to previous CXR.  Patient also has elevated WBC and requiring supplemental oxygen at this time.  Also, with suspected aspiration will discontinue IV Rocephin and azithromycin as medication does not provide anaerobic coverage 
      Hospitalist Progress Note    NAME:   Kvng Arellano   : 1960   MRN: [id number]     Date/Time: 2024 11:37 AM  Patient PCP: Karen Samano APRN - NP    Estimated discharge date: 24 hours  Barriers: IV antibiotics, repeat CXR in the morning    Hospital course:  Patient is a 64-year-old male with a past medical history of type II DM, hypertension, diastolic heart failure, stroke, HLD, CKD stage III, CVA and dementia who was admitted on 2024 from nursing home with coffee-ground emesis.  CT of the chest revealed bilateral multifocal groundglass opacities, suspected aspiration.  Patient was initiated on IV Rocephin and azithromycin after blood cultures obtained.  Also, patient noted to have JACQUE so was initiated on IV fluids with nephrology consult.  Hemoglobin dropped with bright red heme hematemesis.  Antiplatelet held and GI consult placed.  GI evaluated patient and feels this is most likely secondary to Karla-Pan tear, recommends considering EGD on Monday or Tuesday, but delayed due to concerns with consent.  Patient has caregiver at bedside and requesting for goals of care discussion, but patient has no living relatives as power of .  Psych evaluated patient this morning and feels patient is able to make medical decisions at this time so case management will attempt to get proper paperwork to caregiver to make medical decisions.  Also, patient lost IV access so we will need either midline or PICC placed.  Patient has experienced persistent metabolic acidosis so nephrology initiated bicarb drip, but patient has not received medication due to lack of IV access. Repeat CXR on 2024 revealed ill-defined patchy, lobar densities appear increased compared to previous CXR.  Patient also has elevated WBC and requiring supplemental oxygen at this time.  Also, with suspected aspiration will discontinue IV Rocephin and azithromycin as medication does not provide anaerobic coverage and 
      Hospitalist Progress Note    NAME:   Kvng Arellano   : 1960   MRN: [id number]     Date/Time: 2024 10:38 AM  Patient PCP: Karen Samano APRN - NP    Estimated discharge date: 48 hours  Barriers: Clinical improvement, JACQUE resolution, possible GI bleed, repeat CXR      Assessment / Plan:  Possible aspiration pneumonia  - Continue IV Rocephin and azithromycin, should finish ABX tomorrow  - Mycoplasma negative  - Leukocytosis resolved  - SLP recommended upright positioning during p.o. intake, slow rate, small single sips and bites  - Continue Mucinex  - Repeat CXR pending today    Acute hypoxic respiratory failure, resolved  - Currently stable on room air  - Wean as tolerated with SpO2 goal greater than 90%  - Continue Symbicort    Possible GI bleed  - Hemoglobin decreased to 8.1, now stable at 8.2  - Fecal occult positive  - GI recommending likely Karla-Pan tear, continue PPI, H&H, possible EGD early next week  - Will make n.p.o. at midnight for possible EGD tomorrow  - Hold antiplatelets, heparin  - Monitor and transfuse for hemoglobin less than 7  - IV Protonix    JACQUE on CKD  Hypernatremia  - Creatinine improved to 3.8 today  - Nephrology following, appreciate recs  - IVF switched to 0.2 NS    Hypokalemia  - K 3.4, replete  - Magnesium 1.9    Hypertension  - Patient takes some oral medications however refuses others but unclear if he is truly taking all oral meds  - IV labetalol and hydralazine as needed with parameters  - Continue amlodipine 10 mg, Lopressor 50 mg twice daily, hydralazine 25 mg 3 times daily    Type 2 diabetes  - Continue sliding scale insulin and Accu-Cheks  - A1c 5.1%    Hypothyroidism  - TSH elevated at 4.02, check free T4  - Continue Synthroid    Hyperlipidemia  - Continue statin    Diastolic heart failure  - Currently appears euvolemic, monitor volume status closely    History of CVA  - Continue ASA/Plavix upon resolution of GI bleed  - Continue statin    Dementia  - No 
      Hospitalist Progress Note    NAME:   Kvng Arellano   : 1960   MRN: [id number]     Date/Time: 2024 12:34 PM  Patient PCP: Karen Samano APRN - NP    Estimated discharge date: 48 hours  Barriers: IV antibiotics, wean supplemental O2    Hospital course:  Patient is a 64-year-old male with a past medical history of type II DM, hypertension, diastolic heart failure, stroke, HLD, CKD stage III, CVA and dementia who was admitted on 2024 from nursing home with coffee-ground emesis.  CT of the chest revealed bilateral multifocal groundglass opacities, suspected aspiration.  Patient was initiated on IV Rocephin and azithromycin after blood cultures obtained.  Also, patient noted to have JACQUE so was initiated on IV fluids with nephrology consult.  Hemoglobin dropped with bright red heme hematemesis.  Antiplatelet held and GI consult placed.  GI evaluated patient and feels this is most likely secondary to Karla-Pan tear, recommends considering EGD on Monday or Tuesday, but delayed due to concerns with consent.  Patient has caregiver at bedside and requesting for goals of care discussion, but patient has no living relatives as power of .  Psych evaluated patient this morning and feels patient is able to make medical decisions at this time so case management will attempt to get proper paperwork to caregiver to make medical decisions.  Also, patient lost IV access so we will need either midline or PICC placed.  Patient has experienced persistent metabolic acidosis so nephrology initiated bicarb drip, but patient has not received medication due to lack of IV access. Repeat CXR on 2024 revealed ill-defined patchy, lobar densities appear increased compared to previous CXR.  Patient also has elevated WBC and requiring supplemental oxygen at this time.  Also, with suspected aspiration will discontinue IV Rocephin and azithromycin as medication does not provide anaerobic coverage and initiate 
      Hospitalist Progress Note    NAME:   Kvng Arellano   : 1960   MRN: [id number]     Date/Time: 6/3/2024 9:31 AM  Patient PCP: Karen Samano APRN - NP    Estimated discharge date: 48 hours  Barriers: Clinical improvement, JACQUE resolution      Assessment / Plan:  Suspected aspiration pneumonia  - Continue IV Rocephin and azithromycin, Mucinex  - Mycoplasma negative  - SLP recommended upright positioning during p.o. intake, slow rate, small single sips and bites  - Repeat CXR with mildly increased lobular densities however patient symptomatically improved and is on room air    Dementia  Anorexia  - Patient seems to be refusing oral intake, refusing appetite stimulant  - Psychiatry consult  - Dietitian consult  - Patient's caregiver at bedside requesting hospice information and CODE STATUS discussion however unclear if caregiver is true medical decision maker.  CM and social work consult to assist with determining medical decision-making    Possible GI bleed  - Hemoglobin decreased to 8.1, now stable at 9.2  - Fecal occult positive  - GI recommending likely Karla-Pan tear, EGD attempted morning 6/3 however patient slightly confused secondary to hypoglycemia and unable to sign consent  - Hold antiplatelets, heparin  - Monitor and transfuse for hemoglobin less than 7  - IV Protonix    JACQUE on CKD  Hypernatremia  Metabolic acidosis  - Creatinine improved to 3.58  - Nephrology following, appreciate recs  - Continue bicarb drip    Hypokalemia, improving  - K 3.5 after repletion  - Magnesium 1.9    Hypertension  - Labile blood pressure secondary to refusal of some antihypertensives  - IV labetalol and hydralazine as needed with parameters  - Continue amlodipine 10 mg, Lopressor 50 mg twice daily  - Hydralazine increased to 50 mg 3 times daily    Type 2 diabetes with hypoglycemia  - Continue sliding scale insulin and Accu-Cheks  - A1c 5.1%  - Patient with refusal for p.o. intake    Acute hypoxic respiratory 
      Hospitalist Progress Note    NAME:   Kvng Arellano   : 1960   MRN: [id number]     Date/Time: 2024 11:41 AM  Patient PCP: Karen Samano APRN - NP    Estimated discharge date: 24 hours  Barriers: IV antibiotics, IV fluids and nephrology clearance    Hospital course:  Patient is a 64-year-old male with a past medical history of type II DM, hypertension, diastolic heart failure, stroke, HLD, CKD stage III, CVA and dementia who was admitted on 2024 from nursing home with coffee-ground emesis.  CT of the chest revealed bilateral multifocal groundglass opacities, suspected aspiration.  Patient was initiated on IV Rocephin and azithromycin after blood cultures obtained.  Also, patient noted to have JACQUE so was initiated on IV fluids with nephrology consult.  Hemoglobin dropped with bright red heme hematemesis.  Antiplatelet held and GI consult placed.  GI evaluated patient and feels this is most likely secondary to Karla-Pan tear, recommends considering EGD on Monday or Tuesday, but delayed due to concerns with consent.  Patient has caregiver at bedside and requesting for goals of care discussion, but patient has no living relatives as power of .  Psych evaluated patient this morning and feels patient is able to make medical decisions at this time so case management will attempt to get proper paperwork to caregiver to make medical decisions.  Also, patient lost IV access so we will need either midline or PICC placed.  Patient has experienced persistent metabolic acidosis so nephrology initiated bicarb drip, but patient has not received medication due to lack of IV access. Repeat CXR on 2024 revealed ill-defined patchy, lobar densities appear increased compared to previous CXR.  Patient also has elevated WBC and requiring supplemental oxygen at this time.  Also, with suspected aspiration will discontinue IV Rocephin and azithromycin as medication does not provide anaerobic coverage 
   06/02/24 0800   RT Protocol   History Pulmonary Disease 2   Respiratory pattern 0   Breath sounds 0   Cough 0   Indications for Bronchodilator Therapy Decreased or absent breath sounds   Bronchodilator Assessment Score 2       
  Pharmacy Note     Zosyn 3.375 gm IV Q6H ordered for treatment of aspiration pneumonia. Per [facility] Policy, Zosyn will be changed to 4.5 gm once followed by 3.375 gm IV Q12H.     Estimated Creatinine Clearance: Estimated Creatinine Clearance: 17 mL/min (A) (based on SCr of 3.33 mg/dL (H)).    BMI:  Body mass index is 18.25 kg/m².    Rationale for Adjustment:  [facility] B-Lactam extended infusion policy    Pharmacy will continue to monitor and adjust dose as necessary.      Please call with any questions.    Thank you,  Christi [facility]   
  Physician Progress Note      PATIENT:               NEY GARCIA  [facility] #:                  [id number]  :                       1960  ADMIT DATE:       2024 4:46 AM  DISCH DATE:  RESPONDING  PROVIDER #:        Shailesh Wright          QUERY TEXT:    Patient admitted with Possible aspiration PNA. Noted to have severe   malnutrition per RD consult note dated 6/3. If possible, please document in   progress notes and discharge summary if you are evaluating and /or treating   any of the following:    The medical record reflects the following:  Risk Factors: 63 y/o, Aspiration PNA< Acute respiratory failure, anorexia    Clinical Indicators: Per RD Malnutrition Assessment:  *  Malnutrition Status:  Severe malnutrition (24 1614)  *  Context:  Acute Illness  *  Findings of the 6 clinical characteristics of malnutrition: (+ findings)  +Energy Intake:  50% or less of estimated energy requirements for 5 or more   days  +Weight Loss:  Greater than 2% over 1 week    Treatment: RD Consult with Nutrition Recommendations/Plan:  1. Initiate ONS with meals  2. Monitor po intake,weight and labs    ASPEN Criteria:    https://aspenjournals.onlinelibrary.alcaraz.com/doi/full/10.1177/055634387649814  5  Options provided:  -- Protein calorie malnutrition severe  -- Other - I will add my own diagnosis  -- Disagree - Not applicable / Not valid  -- Disagree - Clinically unable to determine / Unknown  -- Refer to Clinical Documentation Reviewer    PROVIDER RESPONSE TEXT:    This patient has severe protein calorie malnutrition.    Query created by: Nhung Andrade on 2024 5:20 PM      Electronically signed by:  Shailesh Wright 2024 8:33 AM          
 responded to spiritual consult for pt requesting to complete an AMD. Pt is resting and requests  return later.  provided active listening and ministry of presence at bedside.     will return later for ACP conversation.    Rev. Saima Holland MDIV   can be reached by calling the  at [facility]  [phone]     
 returned for ACP conversation and to complete Advance Medical Directive.  reviewed chart note from psychiatrist who deemed the patient to have capacity.  assessment is that the pt  is unable to complete an AMD at this time. Pt could not spell his last name and regarding the agent selection, pt provided the name of a  individual.  conferred with CM post visit at  informing her the  is refusing to complete the AMD at this time.    Pt is seated upright in bed eating his lunch and invites the  remain for the visit. He reports he is feeling better today     Invited pt to articulate his feelings. Explored pt's ability to respond appropriately to guided questions.  provided active listening and a peaceful and supportive presence.    Please contact Spiritual Care for any emotional/spiritual needs and/or further referrals. Thank you.    Rev. Saima Holland MDIV   can be reached by calling the  at [facility]  [phone]     
4 Eyes Skin Assessment     NAME:  Kvng Arellano  YOB: 1960  MEDICAL RECORD NUMBER:  [id number]    The patient is being assessed for  Per unit protocol every Wednesday    I agree that at least one RN has performed a thorough Head to Toe Skin Assessment on the patient. ALL assessment sites listed below have been assessed.      Areas assessed by both nurses:    Head, Face, Ears, Shoulders, Back, Chest, Arms, Elbows, Hands, Sacrum. Buttock, Coccyx, Ischium, Legs. Feet and Heels, and Under Medical Devices         Does the Patient have a Wound? No noted wound(s)       Amarjit Prevention initiated by RN: Yes  Wound Care Orders initiated by RN: No    Pressure Injury (Stage 3,4, Unstageable, DTI, NWPT, and Complex wounds) if present, place Wound referral order by RN under : No    New Ostomies, if present place, Ostomy referral order under : No     Nurse 1 eSignature: Electronically signed by Angelia Vasquez RN on 6/5/24 at 2:32 AM EDT    **SHARE this note so that the co-signing nurse can place an eSignature**    Nurse 2 eSignature: Electronically signed by Christine Green RN on 6/5/24 at 2:38 AM EDT   
4 Eyes Skin Assessment     NAME:  Kvng Arellano  YOB: 1960  MEDICAL RECORD NUMBER:  [id number]    The patient is being assessed for  Admission    I agree that at least one RN has performed a thorough Head to Toe Skin Assessment on the patient. ALL assessment sites listed below have been assessed.      Areas assessed by both nurses:    Head, Face, Ears, Shoulders, Back, Chest, Arms, Elbows, Hands, Sacrum. Buttock, Coccyx, Ischium, Legs. Feet and Heels, and Under Medical Devices         Does the Patient have a Wound? No noted wound(s)       Amarjit Prevention initiated by RN: Yes  Wound Care Orders initiated by RN: No    Pressure Injury (Stage 3,4, Unstageable, DTI, NWPT, and Complex wounds) if present, place Wound referral order by RN under : No    New Ostomies, if present place, Ostomy referral order under : No     Nurse 1 eSignature: Electronically signed by Sierra Jones RN on 5/30/24 at 2:54 PM EDT    **SHARE this note so that the co-signing nurse can place an eSignature**    Nurse 2 eSignature: Electronically signed by PEDRO BARLOW RN on 5/30/24 at 2:58 PM EDT    
Assumed care of patient at this time. Bedside shift report received from CORRY Mary  
At 0942am, patient FBG was 44mg/dl, rechecked and recorded 44mg/dL. Patient was given orange juice. Attending provider Germaine was notified.    At 10:43am, blood glucose was rechecked and recorded 43mg/dL. Prescribed 10% dextrose given.  At 10:52, RBG is 152mg/dL  
Attempted dysphagia tx. Patient refuses PO intake stating \"I just don't fee like eating\". He was agreeable to 3oz water trial to complete swallow screen post fall. Patient passed and screen documented discussed w/ RN. ST will cont to follow.  
Attempted to see pt at 2:38 this PM, however pt sleepy. Difficult to stay awake. Will continue to follow and attempt at a later time/date.   
Comprehensive Nutrition Assessment    Type and Reason for Visit:  Reassess    Nutrition Recommendations/Plan:   Adjust diet to 4 CHO/meal   Adjust ONS for glucose control  Monitor po intake, weight and labs     Malnutrition Assessment:  Malnutrition Status:  Severe malnutrition (05/31/24 1614)    Context:  Acute Illness     Findings of the 6 clinical characteristics of malnutrition:  Energy Intake:  50% or less of estimated energy requirements for 5 or more days  Weight Loss:  Greater than 2% over 1 week     Body Fat Loss:  Unable to assess     Muscle Mass Loss:  Unable to assess    Fluid Accumulation:  Unable to assess     Strength:  Not Performed    Nutrition Assessment:    Patient admitted with CAB Pneumonia, currently Soft and Bite Size diet,po intake 1-25%, patient with N/V upon visit, states can't keep anything down. Current weight is down 6.8kg/11% in <1 month. Labs and meds reviewed. 06/05/24 Follow up. Patient sleeping at time of visit, documented intake ~25%. Labs reviewed, glu levels are elevated, patient recieves insulin, will adjust diet and ONS to aid in glucose control.    Nutrition Related Findings:    NFPE deferred Wound Type: None       Current Nutrition Intake & Therapies:    Average Meal Intake: 1-25%  Average Supplements Intake: None Ordered  ADULT DIET; Dysphagia - Soft and Bite Sized  ADULT ORAL NUTRITION SUPPLEMENT; Lunch, Dinner; Frozen Oral Supplement  ADULT ORAL NUTRITION SUPPLEMENT; Breakfast; Standard High Calorie/High Protein Oral Supplement    Anthropometric Measures:  Height: 172.7 cm (5' 7.99\")  Ideal Body Weight (IBW): 154 lbs (70 kg)    Admission Body Weight: 54.4 kg (119 lb 14.9 oz)  Current Body Weight: 54.5 kg (120 lb 2.4 oz), 78 % IBW.    Current BMI (kg/m2): 18.3  Usual Body Weight: 61.2 kg (134 lb 14.7 oz)  % Weight Change (Calculated): -10.9                    BMI Categories: Underweight (BMI less than 18.5)    Estimated Daily Nutrient Needs:  Energy Requirements Based 
Comprehensive Nutrition Assessment    Type and Reason for Visit:  Reassess    Nutrition Recommendations/Plan:   Continue Dysphagia diet  Increase to 4 CHO per previous RD note  Confirm pt getting DM ONS     Malnutrition Assessment:  Malnutrition Status:  Severe malnutrition (05/31/24 1614)    Context:  Acute Illness     Findings of the 6 clinical characteristics of malnutrition:  Energy Intake:  50% or less of estimated energy requirements for 5 or more days  Weight Loss:  Greater than 2% over 1 week     Body Fat Loss:  Unable to assess     Muscle Mass Loss:  Unable to assess    Fluid Accumulation:  Unable to assess     Strength:  Not Performed    Nutrition Assessment:        Patient admitted with CAB Pneumonia, currently Soft and Bite Size diet,po intake 1-25%, patient with N/V upon visit, states can't keep anything down. Current weight is down 6.8kg/11% in <1 month. Labs and meds reviewed. 06/05/24 Follow up. Patient sleeping at time of visit, documented intake ~25%. Labs reviewed, glu levels are elevated, patient recieves insulin, will adjust diet and ONS to aid in glucose control.     6/7/2024  Pt seen today in follow up. Continues with lower po intake than desirable. 25-50% of meals. Noted po supplement in room; unopened. Noted pt has psych consult re: eating behaviors. Unable to awaken patient at visit today. No updated weight. Noted Lantus held currently. POC Glu 109,126,111,127,141,100,196,134mg/dl. Good overall BG.    Nutrition Related Findings:    NFPE - noted orbital region shows mild subQ fat loss. Wound Type: None       Current Nutrition Intake & Therapies:    Average Meal Intake: 1-25%, 26-50%  Average Supplements Intake: Unable to assess (unknown -)  ADULT DIET; Dysphagia - Soft and Bite Sized; 3 carb choices (45 gm/meal)  ADULT ORAL NUTRITION SUPPLEMENT; Breakfast, Lunch, Dinner; Diabetic Oral Supplement    Anthropometric Measures:  Height: 172.7 cm (5' 7.99\")  Ideal Body Weight (IBW): 154 lbs 
Consult received, chart reviewed and spoke with nsg, pt and Stepan Herron PA-C. Pt admitted with suspected aspiration pna related to coffee ground emesis, JACQUE. Nsg reports pt vomited following intake of clear liquids this morning. Pt reports at this time that he feels as though he will vomit if he takes anything by mouth. Noted drop in HgB as well.  GI consult has been placed by PA and nsg reports will administer Zofran as ordered. At this time, will hold bedside sw evaluation.  Pt was seen by SLP in 2022 and was on reg/thin diet at that time. Pt reports current diet remains reg/thin. Will cont to follow and complete evaluation as indicated.   
Janine Kinsey called in regards to an update on patient's status. RN notified patient. Patient gave permission to give her an update. Ms. Kinsey stated she would like to speak to CM. Writer notified her of CM hours. Stated she would call back tomorrow  
OCCUPATIONAL THERAPY TREATMENT  Patient: Kvng Arellano (64 y.o. male)  Date: 6/6/2024  Primary Diagnosis: Community acquired bacterial pneumonia [J15.9]  Acute renal failure, unspecified acute renal failure type (HCC) [N17.9]  Multifocal pneumonia [J18.9]  Nausea and vomiting, unspecified vomiting type [R11.2]  Procedure(s) (LRB):  ESOPHAGOGASTRODUODENOSCOPY (N/A) 3 Days Post-Op   Precautions: Bed Alarm, Fall Risk                Recommendations for nursing mobility: Out of bed to chair for meals, Encourage HEP in prep for ADLs/mobility; see handout for details, Use of bed/chair alarm for safety, Use of BSC for toileting , AD and gt belt for bed to chair , and Assist x2    In place during session: Peripheral IV, External Catheter, and EKG/telemetry   Chart, occupational therapy assessment, plan of care, and goals were reviewed.  ASSESSMENT  Patient continues with skilled OT services and is progressing towards goals. Pt semi supine in bed upon OT arrival, agreeable to session. Pt A&O x 1. Pt required overall Min A and extended amount of time for all bed mobility.  Pt found to be wet w/ urine once seated eob and required min/mod A to don/doff hospital gown d/t snaps and IV lines. Pt completed 2 sts w/ rw and mod/max A .  Pt initially c/o of dizziness on 1st stand and requested to sit back down. Dizziness quickly resolved and pt returned to standing.  Pt t/f ed to recliner w/ use of RW and Max a.  Posterior leaning noted during t/f that required max level of assist for standing balance. Pt seated in recliner w/ legs elevated and call bell with in reach.  Pt instructed to call staff when ready to go back to bed and not to stand on his own. Pt verbalizing understanding and was able to point to which button to push on control to call nursing.  Chair alarm placed in chair and turned on. Pt required set up of meal tray. Side table place in front of pt and left w/ all known needs met. . (See below for objective details 
Patient arrives to [facility] from ED. Pt lethargic and oriented to self. Pt hypertensive, notified MD. Stated he will place PRN orders  
Patient found lying in bed resting with eyes closed easily arousalable. Patient has a flat affect and needs extra coaching to participate in care. Once patient coached, patient agreeable however still very apathetic. Patient left upper arm midline IV line dressing changed. Dressing dry and intact. No s/s of infection or infiltration. Patient tolerated well. Will continue to monitor. No complaints at this time.   
Patient stable for DC. Midline removed. Attempted to call report to [place], no answer.   
Per RN, patient states that he does not like to eat.   Upon entry of room, Am tray on bedside table and no items consumed. Dietary services, enters and delivers noontime tray and patient declines po trials and states he does not like to eat.   Rec: psych consult vs appetite stimulant.   ST to follow.   
RN noted unwitnessed fall. Patient was up in chair and \"was trying to get back to bed.\" Fall precautions in place. VS stable. Patient assisted to bed by 2 RN's. Patient denies hitting his head. Denies pain. 2 RN's transported patient to CT for STAT head CT. Provider notified. Fall precautions in place, call bell within reach   
RT Inhaler-Nebulizer Bronchodilator Protocol Note    There is a bronchodilator order in the chart from a provider indicating to follow the RT Bronchodilator Protocol and there is an “Initiate RT Inhaler-Nebulizer Bronchodilator Protocol” order as well (see protocol at bottom of note).    CXR Findings:  XR CHEST PORTABLE    Result Date: 5/30/2024  Mild bilateral midlung patchy airspace disease, left greater than right. Recommend short-term follow-up to confirm resolution. CT can be performed for further evaluation, if indicated.       The findings from the last RT Protocol Assessment were as follows:   History Pulmonary Disease: Chronic pulmonary disease  Respiratory Pattern: Regular pattern and RR 12-20 bpm  Breath Sounds: Slightly diminished and/or crackles  Cough: Strong, spontaneous, non-productive  Indication for Bronchodilator Therapy: Decreased or absent breath sounds  Bronchodilator Assessment Score: 4    Aerosolized bronchodilator medication orders have been revised according to the RT Inhaler-Nebulizer Bronchodilator Protocol below.    Respiratory Therapist to perform RT Therapy Protocol Assessment initially then follow the protocol.  Repeat RT Therapy Protocol Assessment PRN for score 0-3 or on second treatment, BID, and PRN for scores above 3.    No Indications - adjust the frequency to every 6 hours PRN wheezing or bronchospasm, if no treatments needed after 48 hours then discontinue using Per Protocol order mode.     If indication present, adjust the RT bronchodilator orders based on the Bronchodilator Assessment Score as indicated below.  Use Inhaler orders unless patient has one or more of the following: on home nebulizer, not able to hold breath for 10 seconds, is not alert and oriented, cannot activate and use MDI correctly, or respiratory rate 25 breaths per minute or more, then use the equivalent nebulizer order(s) with same Frequency and PRN reasons based on the score.  If a patient is on this 
Urinalysis order noted. Patient has not voided since arrival to unit. Patient bladder scanned 275mL. Patient states he does not feel like he has to go. Notified MD orders to straight cath if bladder scan > 300mL  
includes Bible reading and prayer.      explored pt's feelings/concerns. Validated pt's feelings. Asked guided questions regarding pt's grief process, familial relationships, and Advent beliefs/practices, and the nature and presence of God. Provided grief care, comforting words, and a peaceful and comforting presence. Informed pt of  availability.    Please contact Spiritual Health for any emotional/spiritual needs and/or further referrals. Thank you.    Rev. Saima Holland MDIV   can be reached by calling the  at [facility]  [phone]         
mg  600 mg Oral BID Álvaro Herron PA-C   600 mg at 06/08/24 0925    budesonide-formoterol (SYMBICORT) 160-4.5 MCG/ACT inhaler 2 puff  2 puff Inhalation BID RT Álvaro Herron PA-C   2 puff at 06/08/24 0846    hydrALAZINE (APRESOLINE) injection 10 mg  10 mg IntraVENous Q6H PRN Álvaro Herron PA-C   10 mg at 06/01/24 0850    pantoprazole (PROTONIX) injection 40 mg  40 mg IntraVENous BID Mac Varela MD   40 mg at 06/08/24 0926    amLODIPine (NORVASC) tablet 10 mg  10 mg Oral Daily Saroj Avalos MD   10 mg at 06/08/24 0925    atorvastatin (LIPITOR) tablet 40 mg  40 mg Oral Daily Saroj Avalos MD   40 mg at 06/08/24 0925    [Held by provider] clopidogrel (PLAVIX) tablet 75 mg  75 mg Oral Daily Saroj Avalos MD   75 mg at 05/31/24 0857    donepezil (ARICEPT) tablet 5 mg  5 mg Oral QAM Saroj Avalos MD   5 mg at 06/08/24 0926    [Held by provider] insulin glargine (LANTUS) injection vial 15 Units  15 Units SubCUTAneous Nightly Saroj Avalos MD   15 Units at 06/02/24 2132    [Held by provider] losartan (COZAAR) tablet 25 mg  25 mg Oral Daily Saroj Avalos MD        metoprolol tartrate (LOPRESSOR) tablet 50 mg  50 mg Oral BID Saroj Avalos MD   50 mg at 06/08/24 0925    vitamin D (ERGOCALCIFEROL) capsule 50,000 Units  50,000 Units Oral Weekly Saroj Avalos MD   50,000 Units at 06/07/24 0939    levothyroxine (SYNTHROID) tablet 25 mcg  25 mcg Oral Daily Saroj Avalos MD   25 mcg at 06/08/24 0611    tamsulosin (FLOMAX) capsule 0.4 mg  0.4 mg Oral Daily Saroj Avalos MD   0.4 mg at 06/08/24 0925    traZODone (DESYREL) tablet 50 mg  50 mg Oral Nightly PRN Saroj Avalos MD   50 mg at 06/05/24 0533    insulin lispro (HUMALOG,ADMELOG) injection vial 0-4 Units  0-4 Units SubCUTAneous TID WC Saroj Avalos MD   1 Units at 06/06/24 1153    insulin lispro (HUMALOG,ADMELOG) injection vial 0-4 Units  0-4 Units SubCUTAneous Nightly Saroj Avalos MD        sodium chloride flush 
Oriented to person  Cognition  Overall Cognitive Status: Exceptions  Arousal/Alertness: Delayed responses to stimuli  Following Commands: Follows one step commands with repetition;Follows one step commands with increased time  Attention Span: Difficulty attending to directions  Safety Judgement: Impaired;Decreased awareness of need for assistance;Decreased awareness of need for safety  Problem Solving: Decreased awareness of errors;Assistance required to correct errors made;Assistance required to implement solutions;Able to problem solve independently;Assistance required to generate solutions;Assistance required to identify errors made  Insights: Decreased awareness of deficits  Initiation: Requires cues for some  Sequencing: Requires cues for some    Functional Mobility and Transfers for ADLs:  Bed Mobility:  Bed Mobility Training  Bed Mobility Training: No      ADL Intervention:    Grooming: Setup   Grooming Skilled Clinical Factors: Washing face semi supien in bed.    Therapeutic exercise:  Completed semi supine in bed w/ mod verbal/ visual and tactile cues for proper technique and self pacing.   Exercise Sets Reps AROM AAROM PROM Self PROM Comments   Shoulder flex/ext 1 12 [x] [] [] []    Elbow flex/ext 1 12 [x] [] [] []    Wrist flex/ext 1 12 [x] [] [] []    Hand flex/ ext 1 12 [x] [] [] []      Pain Ratin/10   Pain Intervention(s):   pain is at a level acceptable to the patient    Activity Tolerance:   Fair  and requires frequent rest breaks    After treatment patient left in no apparent distress:   Bed locked and returned to lowest position, Patient left in no apparent distress in bed, Call bell within reach, and Side rails x3, and nsg updated     COMMUNICATION/EDUCATION:   The patient’s plan of care was discussed with: Registered nurse    Patient Education  Education Given To: Patient  Education Provided: Role of Therapy;Plan of Care;ADL Adaptive Strategies;Transfer Training  Education Method: 
hypertension, diastolic heart failure stroke, hyperlipidemia, CKD stage III who presented to the emergency department with nausea vomiting.  Patient resides at [facility].  Apparently had 3 episodes of coffee-ground emesis.   Patient was on 3 L of nasal cannula on arrival to the ED.  Labs revealing K of 5.6 elevated creatinine of 5.93.  Leukocytosis.  CT of the chest revealing bilateral multifocal groundglass opacities.  IV ceftriaxone and azithromycin initiated and blood cultures drawn for sepsis.  Nephrology consulted.  Hemoglobin dropped with bright red hematemesis, GI consult placed and antiplatelets held.  GI recommending possible Karla-Pan tear, consider EGD Monday or Tuesday.  SLP following.     Subjective:  Patient seen in room for follow-up.  He reports he feels slightly better today, his breathing feels more normal but he is still nauseous.  We discussed switching his antinausea medicines and continuing to wean oxygen.  Objective:     VITALS:   Last 24hrs VS reviewed since prior progress note. Most recent are:  Patient Vitals for the past 24 hrs:   BP Temp Temp src Pulse Resp SpO2 Height   06/01/24 0904 -- -- -- -- -- 99 % --   06/01/24 0849 (!) 185/94 -- -- -- -- -- --   06/01/24 0752 (!) 187/99 97.7 °F (36.5 °C) -- 95 16 98 % --   06/01/24 0100 (!) 153/91 97.9 °F (36.6 °C) Axillary 96 18 99 % --   05/31/24 2048 -- -- -- -- -- 97 % --   05/31/24 2045 (!) 170/89 97.7 °F (36.5 °C) Axillary 87 18 97 % --   05/31/24 1624 -- -- -- -- -- -- 1.727 m (5' 7.99\")   05/31/24 1545 (!) 151/78 97.5 °F (36.4 °C) Oral 76 18 100 % --   05/31/24 1300 (!) 144/80 -- -- -- -- -- --   05/31/24 1215 (!) 177/86 -- -- 76 19 -- --   05/31/24 1200 (!) 175/91 -- -- -- -- -- --   05/31/24 1102 -- -- -- 77 18 99 % --   05/31/24 1045 (!) 187/91 97.8 °F (36.6 °C) Oral 78 19 99 % --           Intake/Output Summary (Last 24 hours) at 6/1/2024 0925  Last data filed at 6/1/2024 0622  Gross per 24 hour   Intake --   Output 2204 
Alert        Assessment/Plan:   Acute kidney injury on chronic kidney disease stage IV  2/2  prerenal azotemia from volume depletion plus on losartan.  On admission BUN/creatinine 60/5.30  Resolving JACQUE as BUN 50/3.80  Labs are pending today  Baseline creatinine was 2.6 on 10/3/2023  He was volume down  [place] UA and pending UPCR  No hydronephrosis per CT of the abdomen  Continue IV hydration  On no potential nephrotoxins    Hypernatremia  Free water deficit  Sodium 148->140  continue hypotonic IV fluid    Metabolic acidosis  Normal anion gap  Hyperchloremic  CO2 18  Started on on bicarb drip    Hypokalemia  K is 3.4  Received KCl replacement    Hyperkalemia  Due to acute kidney injury plus on losartan  K was 5.6  Received p.o. Lokelma 10 g x 1 dose  Resolved    Hypertension  Elevated blood pressures  Agree to hold losartan due to JACQUE/hyperkalemia  On amlodipine 10 mg daily and metoprolol 50 mg twice a day  As needed IV labetalol  Increasing hydralazine 100 mg p.o. TID    Acute hypoxic respiratory failure  Likely from aspiration pneumonia  He was sent for recurrent vomiting in the nursing home  Chest x-ray bilateral patchy opacities  He was put on 3 L nasal cannula  Now on room air  On IV antibiotics    Nausea vomiting  Etiology unclear  CT abdomen no acute pathology  Normal lipase LFTs  IV fluids  .  Dementia  With behavioral disturbances  On Aricept 5 mg daily  Along with Wellbutrin and Zoloft  He seems to be calm and comfortable today    Anemia  Anemia of chronic kidney disease  Hemoglobin 8.2  TSAT 17 and ferritin level 369  Will give erythropoietin subcu     DVT prophylaxis  Agree with unfractionated subcu heparin    Hypoglycemia  Noticed episodes of low blood sugars  On D5 drip  Plan:       Signed By: Fatmata Monroy MD     Radha 3, 2024       
JVD  Chest: Fair bilateral air entry  Heart: normal sounds  Abdomen: soft and non tender   : no gilmore  Lower Extremities: no edema  Skin: no rash  Neuro: Alert        Assessment/Plan:   Acute kidney injury on chronic kidney disease stage IV  2/2  prerenal azotemia from volume depletion plus on losartan.  On admission BUN/creatinine 60/5.30  Resolving JACQUE as BUN/Cr 38/3.3.  Baseline creatinine was 2.6 on 10/3/2023  He was volume down  [place] UA and pending UPCR  No hydronephrosis per CT of the abdomen  DC IV hydration  On no potential nephrotoxins    Hypernatremia  Free water deficit  Sodium 148->135  discontinue hypotonic IV fluid    Metabolic acidosis  Normal anion gap  Hyperchloremic  CO2 18->20  Dc bicarb drip  Po NaHCO3 650 mg tid w meals.    Hypokalemia  K 3.4->3.5  Received KCl replacement    Hyperkalemia  Due to acute kidney injury plus on losartan  K was 5.6  Received p.o. Lokelma 10 g x 1 dose  Resolved    Hypertension  Elevated blood pressures  Agree to hold losartan due to JACQUE/hyperkalemia  On amlodipine 10 mg daily and metoprolol 50 mg twice a day  As needed IV labetalol  Increasing hydralazine 100 mg p.o. TID    Acute hypoxic respiratory failure  Likely from aspiration pneumonia  He was sent for recurrent vomiting in the nursing home  Chest x-ray bilateral patchy opacities  He was put on 3 L nasal cannula  was on room air but low sats today ? Fluid oveload  Iv lasix 40 mg x 1   DC IVF  On IV antibiotics    Nausea vomiting  Etiology unclear  CT abdomen no acute pathology  Normal lipase LFTs  DC IV fluids  .  Dementia  With behavioral disturbances  On Aricept 5 mg daily  Along with Wellbutrin and Zoloft  He seems to be calm and comfortable today    Anemia  Anemia of chronic kidney disease  Hemoglobin 8.2  TSAT 17 and ferritin level 369  Will give erythropoietin subcu     DVT prophylaxis  Agree with unfractionated subcu heparin    Hypoglycemia  Noticed episodes of low blood sugars  D10 drip 30 
a face to face encounter and independently examined this patient on 5/31/2024, as outlined below:    Review of Systems   Constitutional:  Positive for fatigue. Negative for diaphoresis and fever.   Respiratory:  Positive for cough and shortness of breath. Negative for wheezing.    Cardiovascular:  Negative for chest pain and palpitations.   Gastrointestinal:  Positive for nausea. Negative for abdominal pain and vomiting.   Musculoskeletal:  Negative for back pain and neck pain.   Skin:  Negative for rash and wound.   Neurological:  Negative for dizziness and light-headedness.   Psychiatric/Behavioral:  Negative for confusion and dysphoric mood.         PHYSICAL EXAM:  Physical Exam  Constitutional:       General: He is not in acute distress.     Appearance: He is ill-appearing.   Cardiovascular:      Rate and Rhythm: Normal rate and regular rhythm.      Pulses: Normal pulses.   Pulmonary:      Effort: Pulmonary effort is normal. No respiratory distress.      Breath sounds: Normal breath sounds. No wheezing.   Abdominal:      General: Abdomen is flat. There is no distension.      Palpations: Abdomen is soft.      Tenderness: There is no abdominal tenderness.   Musculoskeletal:         General: No swelling or tenderness. Normal range of motion.   Skin:     General: Skin is warm and dry.   Neurological:      Mental Status: He is alert and oriented to person, place, and time. Mental status is at baseline.   Psychiatric:         Mood and Affect: Mood normal.         Behavior: Behavior normal.          Reviewed most current lab test results and cultures  YES  Reviewed most current radiology test results   YES  Review and summation of old records today    NO  Reviewed patient's current orders and MAR    YES  PMH/SH reviewed - no change compared to H&P  ________________________________________________________________________  Total NON critical care TIME:  35  Minutes    Total CRITICAL CARE TIME Spent:   Minutes non 
edema  Skin: no rash  Neuro: Alert        Assessment/Plan:   Acute kidney injury on chronic kidney disease stage IV  2/2  prerenal azotemia from volume depletion plus on losartan.  On admission BUN/creatinine 60/5.30  Resolving JACQUE as BUN/Cr 38/3.3.  Creatinine bumped again to 3.61->3.8->4.1  Baseline creatinine was 2.6 on 10/3/2023  [place] UA and pending UPCR  No hydronephrosis per CT of the abdomen  Resume slow IVF.  On no potential nephrotoxins    Hypernatremia  Free water deficit  Sodium 148->141  discontinued hypotonic IV fluid    Metabolic acidosis  Normal anion gap  Hyperchloremic  CO2 19-->23  off bicarb drip  Po NaHCO3 650 mg tid w meals.    Hypokalemia  K 3.4  Po KCl replacement    Hypertension  better blood pressures  Agree to hold losartan due to JACQUE/hyperkalemia  On amlodipine 10 mg daily and metoprolol 50 mg twice a day  As needed IV labetalol  on hydralazine 100 mg p.o. TID    Acute hypoxic respiratory failure  Likely from aspiration pneumonia  He was sent for recurrent vomiting in the nursing home  Chest x-ray bilateral patchy opacities  He was put on 3 L nasal cannula  was on room air but low sats today ? Fluid oveload  Iv lasix 40 mg x 1   DC IVF  On IV antibiotics    Nausea vomiting  Etiology unclear  CT abdomen no acute pathology  Normal lipase LFTs  DC IV fluids  .  Dementia  With behavioral disturbances  On Aricept 5 mg daily  Along with Wellbutrin and Zoloft  He seems to be calm and comfortable today    Anemia  Anemia of chronic kidney disease  Hemoglobin 8.2  TSAT 17 and ferritin level 369  Will give erythropoietin subcu     DVT prophylaxis  Agree with unfractionated subcu heparin    Hypoglycemia  Noticed episodes of low blood sugars  Resolved  Discontinue D10 drip  Plan:       Signed By: Fatmata Monroy MD     June 7, 2024       
losartan  K was 5.6  Received p.o. Lokelma 10 g x 1 dose  Resolved    Hypertension  Elevated blood pressures  Agree to hold losartan due to JACQUE/hyperkalemia  On amlodipine 10 mg daily and metoprolol 50 mg twice a day  As needed IV labetalol  Advised the nurse to give dose of IV labetalol    Acute hypoxic respiratory failure  Likely from aspiration pneumonia  He was sent for recurrent vomiting in the nursing home  Chest x-ray bilateral patchy opacities  He was put on 3 L nasal cannula  On IV antibiotics    Nausea vomiting  Etiology unclear  CT abdomen no acute pathology  Normal lipase LFTs  IV fluids  .  Dementia  With behavioral disturbances  On Aricept 5 mg daily  Along with Wellbutrin and Zoloft  He seems to be calm and comfortable today    Anemia  Anemia of chronic kidney disease  Hemoglobin 9.1  Sending iron studies and ferritin level  Will give erythropoietin subcu    DVT prophylaxis  Agree with unfractionated subcu heparin      Plan:       Signed By: Fatmata Monroy MD     May 31, 2024       
H&P  ________________________________________________________________________  Care Plan discussed with:    Comments   Patient x    Family      RN x    Care Manager     Consultant                        Multidiciplinary team rounds were held today with , nursing, pharmacist and clinical coordinator.  Patient's plan of care was discussed; medications were reviewed and discharge planning was addressed.     ________________________________________________________________________  Total NON critical care TIME:  35  Minutes    Total CRITICAL CARE TIME Spent:   Minutes non procedure based      Comments   >50% of visit spent in counseling and coordination of care     ________________________________________________________________________  Shailesh Wright PA-C     Procedures: see electronic medical records for all procedures/Xrays and details which were not copied into this note but were reviewed prior to creation of Plan.      LABS:  I reviewed today's most current labs and imaging studies.  Pertinent labs include:  Recent Labs     06/06/24  0752 06/07/24  0600   WBC 6.9 7.0   HGB 8.1* 8.3*   HCT 24.1* 24.7*    251       Recent Labs     06/06/24  0752 06/07/24  0600    142   K 3.4* 3.5   * 114*   CO2 23 21   BUN 38* 38*   PHOS 3.5 3.1   ALT 27  --          Signed: Shailesh Wright PA-C   
contrast administration. Brain and bone windows were generated. CT dose reduction was achieved through use of a standardized protocol tailored for this examination and automatic exposure control for dose modulation. FINDINGS: HEAD: Encephalomalacia along the left frontal operculum and perirolandic left frontoparietal lobe. There is no evidence of acute infarct, hemorrhage, or extraaxial fluid collection. The ventricles are normal in size and position. Basilar cisterns are patent. No midline shift. Partially empty sella configuration without significant optic nerve or optic disc abnormality, indeterminate clinical significance. The paranasal sinuses, mastoid air cells, and middle ears are clear. The orbital contents are within normal limits. There is mild scalp lesion with hematoma measuring up to 1.0 x 2.5 cm along posterior right parietal region. Associated overlying laceration with small soft tissue emphysema also noted. No associated calvarial fracture. CERVICAL SPINE: Craniocervical junction alignment is anatomic. There is no significant vertebral listhesis. Facet joints alignments are anatomic. No acute displaced fracture or vertebral body compression deformity. Multilevel degenerative disease changes. At least mild spinal canal narrowing at C5-C6 greater than C4-C5 with moderate to severe right greater than left C5-C6 and mild bilateral C4-C5 neuroforaminal narrowing. The paraspinal soft tissues are unremarkable. Retropharyngeal space is grossly normal. Asymmetric prominence of the right laryngeal vestibule compared to the contralateral side, indeterminate. Lung apices are unremarkable.     Head CT demonstrates no evidence of acute intracranial abnormality. Patchy chronic infarctions along the anterior and posterior left MCA territory. Right parietal scalp hematoma/swelling and suggestion of at least small laceration without obvious skull fracture. Cervical spine CT demonstrate no fracture, vertebral listhesis 
medical records for all procedures/Xrays and details which were not copied into this note but were reviewed prior to creation of Plan.      LABS:  I reviewed today's most current labs and imaging studies.  Pertinent labs include:  Recent Labs     06/03/24  1054 06/04/24  0828 06/05/24  0606   WBC 10.5 13.1* 8.7   HGB 9.2* 9.1* 8.7*   HCT 27.8* 26.9* 25.5*    201 220       Recent Labs     06/03/24  1054 06/04/24  0828 06/05/24  0606   * 135*  135* 136   K 3.5 3.5  3.5 3.9   * 106  105 107   CO2 19* 20*  21 19*   BUN 41* 38*  37* 37*   PHOS 3.0 2.8  --    ALT 33 35 35         Signed: Shailesh Wright PA-C   
continue to be, required for treatment that could reasonably be expected to improve the patient's condition and that the patient continues to need, on a daily basis, active treatment furnished directly by or requiring the supervision of inpatient psychiatric facility personnel. In addition, the hospital records show that services furnished were intensive treatment services.    Signed By:   Cam Menjivar MD  6/4/2024

## 2024-06-17 ENCOUNTER — HOSPITAL ENCOUNTER (EMERGENCY)
Facility: HOSPITAL | Age: 64
Discharge: HOME | End: 2024-06-18
Attending: STUDENT IN AN ORGANIZED HEALTH CARE EDUCATION/TRAINING PROGRAM
Payer: MEDICARE

## 2024-06-17 ENCOUNTER — APPOINTMENT (OUTPATIENT)
Facility: HOSPITAL | Age: 64
End: 2024-06-17
Payer: MEDICARE
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
## 2024-06-17 DIAGNOSIS — D64.9 ANEMIA, UNSPECIFIED TYPE: ICD-10-CM

## 2024-06-17 LAB
ANION GAP SERPL CALC-SCNC: 2 MMOL/L (ref 5–15)
BASOPHILS # BLD: 0.1 K/UL (ref 0–0.1)
BASOPHILS NFR BLD: 1 % (ref 0–1)
BUN SERPL-MCNC: 33 MG/DL (ref 6–20)
BUN/CREAT SERPL: 10 (ref 12–20)
CA-I BLD-MCNC: 8.2 MG/DL (ref 8.5–10.1)
CHLORIDE SERPL-SCNC: 115 MMOL/L (ref 97–108)
CO2 SERPL-SCNC: 25 MMOL/L (ref 21–32)
CREAT SERPL-MCNC: 3.42 MG/DL (ref 0.7–1.3)
DIFFERENTIAL METHOD BLD: ABNORMAL
EOSINOPHIL # BLD: 0.3 K/UL (ref 0–0.4)
EOSINOPHIL NFR BLD: 5 % (ref 0–7)
ERYTHROCYTE [DISTWIDTH] IN BLOOD BY AUTOMATED COUNT: 15.2 % (ref 11.5–14.5)
GLUCOSE SERPL-MCNC: 211 MG/DL (ref 65–100)
HCT VFR BLD AUTO: 21.6 % (ref 36.6–50.3)
HEMOCCULT SP1 STL QL: NEGATIVE
HGB BLD-MCNC: 7 G/DL (ref 12.1–17)
IMM GRANULOCYTES # BLD AUTO: 0 K/UL (ref 0–0.04)
IMM GRANULOCYTES NFR BLD AUTO: 0 % (ref 0–0.5)
LYMPHOCYTES # BLD: 0.8 K/UL (ref 0.8–3.5)
LYMPHOCYTES NFR BLD: 12 % (ref 12–49)
MCH RBC QN AUTO: 31 PG (ref 26–34)
MCHC RBC AUTO-ENTMCNC: 32.4 G/DL (ref 30–36.5)
MCV RBC AUTO: 95.6 FL (ref 80–99)
MONOCYTES # BLD: 0.3 K/UL (ref 0–1)
MONOCYTES NFR BLD: 4 % (ref 5–13)
NEUTS SEG # BLD: 4.9 K/UL (ref 1.8–8)
NEUTS SEG NFR BLD: 78 % (ref 32–75)
NRBC # BLD: 0 K/UL (ref 0–0.01)
NRBC BLD-RTO: 0 PER 100 WBC
PLATELET # BLD AUTO: 302 K/UL (ref 150–400)
PMV BLD AUTO: 9.8 FL (ref 8.9–12.9)
POTASSIUM SERPL-SCNC: 5.2 MMOL/L (ref 3.5–5.1)
RBC # BLD AUTO: 2.26 M/UL (ref 4.1–5.7)
SODIUM SERPL-SCNC: 142 MMOL/L (ref 136–145)
WBC # BLD AUTO: 6.3 K/UL (ref 4.1–11.1)

## 2024-06-17 PROCEDURE — 94761 N-INVAS EAR/PLS OXIMETRY MLT: CPT

## 2024-06-17 PROCEDURE — 80048 BASIC METABOLIC PNL TOTAL CA: CPT

## 2024-06-17 PROCEDURE — 36415 COLL VENOUS BLD VENIPUNCTURE: CPT

## 2024-06-17 PROCEDURE — 82272 OCCULT BLD FECES 1-3 TESTS: CPT

## 2024-06-17 PROCEDURE — 70450 CT HEAD/BRAIN W/O DYE: CPT

## 2024-06-17 PROCEDURE — 85025 COMPLETE CBC W/AUTO DIFF WBC: CPT

## 2024-06-17 PROCEDURE — 72125 CT NECK SPINE W/O DYE: CPT

## 2024-06-17 PROCEDURE — 99284 EMERGENCY DEPT VISIT MOD MDM: CPT

## 2024-06-17 ASSESSMENT — PAIN - FUNCTIONAL ASSESSMENT: PAIN_FUNCTIONAL_ASSESSMENT: 0-10

## 2024-06-17 ASSESSMENT — PAIN SCALES - GENERAL: PAINLEVEL_OUTOF10: 0

## 2024-06-17 NOTE — ED TRIAGE NOTES
[facility]  Original call to EMS was for abnormal labs- low hgb. Stated to EMS that he also had a fall this morning, denies LOC, denies blood thinners. C-collar placed for precaution.   Altered mental status at baseline.

## 2024-06-17 NOTE — ED PROVIDER NOTES
[facility] EMERGENCY DEPT  EMERGENCY DEPARTMENT HISTORY AND PHYSICAL EXAM      Date: 6/17/2024  Patient Name: Kvng Arellano  MRN: [id number]  Birthdate 1960  Date of evaluation: 6/17/2024  Provider: Wen Elizabeth MD   Note Started: 4:00 PM EDT 6/17/24    HISTORY OF PRESENT ILLNESS     Chief Complaint   Patient presents with    Abnormal Lab    Fall       History Provided By: Patient    HPI: Kvng Arellano is a 64 y.o. male with PMH of DM, HTN, HLD, and CKD who comes to the ED from SNF after having a mechanical fall.  Reportedly, patient had low hemoglobin.  Today he was transferred out of bed and fell on the side of his head.  He was placed in c-collar brought to the ED.  Patient currently denies any pain.  No recent illnesses.  No recent changes in bowel, dater, renal habits.    PAST MEDICAL HISTORY   Past Medical History:  Past Medical History:   Diagnosis Date    CVS disease     Diabetes (HCC)     High cholesterol     HTN (hypertension)        Past Surgical History:  Past Surgical History:   Procedure Laterality Date    HERNIA REPAIR         Family History:  History reviewed. No pertinent family history.    Social History:  Social History     Tobacco Use    Smoking status: Never    Smokeless tobacco: Never   Vaping Use    Vaping Use: Never used   Substance Use Topics    Alcohol use: Never    Drug use: Never       Allergies:  Allergies   Allergen Reactions    Ace Inhibitors Swelling    Bee Venom      Other reaction(s): other/intolerance    Sulfa Antibiotics Itching       PCP: Karen Samano APRN - NP    Current Meds:   No current facility-administered medications for this encounter.     Current Outpatient Medications   Medication Sig Dispense Refill    amLODIPine (NORVASC) 10 MG tablet Take 1 tablet by mouth daily 90 tablet 0    atorvastatin (LIPITOR) 40 MG tablet Take 1 tablet by mouth daily 90 tablet 0    mirtazapine (REMERON SOL-TAB) 15 MG disintegrating tablet Take 0.5 tablets by mouth nightly 30

## 2024-06-18 VITALS
HEIGHT: 68 IN | RESPIRATION RATE: 13 BRPM | TEMPERATURE: 98.3 F | HEART RATE: 70 BPM | BODY MASS INDEX: 19.7 KG/M2 | OXYGEN SATURATION: 97 % | WEIGHT: 130 LBS | SYSTOLIC BLOOD PRESSURE: 188 MMHG | DIASTOLIC BLOOD PRESSURE: 86 MMHG

## 2024-06-18 NOTE — ED NOTES
Pt to go by [facility] at his time.  Transport has an eta of within the hour.

## 2024-06-23 ENCOUNTER — HOSPITAL ENCOUNTER (EMERGENCY)
Facility: HOSPITAL | Age: 64
Discharge: HOME | End: 2024-06-23
Attending: STUDENT IN AN ORGANIZED HEALTH CARE EDUCATION/TRAINING PROGRAM
Payer: MEDICARE

## 2024-06-23 VITALS
HEIGHT: 68 IN | HEART RATE: 73 BPM | DIASTOLIC BLOOD PRESSURE: 85 MMHG | OXYGEN SATURATION: 100 % | BODY MASS INDEX: 19.7 KG/M2 | RESPIRATION RATE: 19 BRPM | SYSTOLIC BLOOD PRESSURE: 175 MMHG | TEMPERATURE: 98.1 F | WEIGHT: 130 LBS

## 2024-06-23 DIAGNOSIS — D64.9 CHRONIC ANEMIA: Primary | ICD-10-CM

## 2024-06-23 LAB
ABO + RH BLD: NORMAL
ALBUMIN SERPL-MCNC: 2.1 G/DL (ref 3.5–5)
ALBUMIN/GLOB SERPL: 0.5 (ref 1.1–2.2)
ALP SERPL-CCNC: 155 U/L (ref 45–117)
ALT SERPL-CCNC: 27 U/L (ref 12–78)
ANION GAP SERPL CALC-SCNC: 7 MMOL/L (ref 5–15)
AST SERPL W P-5'-P-CCNC: 24 U/L (ref 15–37)
BASOPHILS # BLD: 0 K/UL (ref 0–0.1)
BASOPHILS NFR BLD: 0 % (ref 0–1)
BILIRUB SERPL-MCNC: 0.3 MG/DL (ref 0.2–1)
BLOOD GROUP ANTIBODIES SERPL: NEGATIVE
BUN SERPL-MCNC: 35 MG/DL (ref 6–20)
BUN/CREAT SERPL: 10 (ref 12–20)
CA-I BLD-MCNC: 8.1 MG/DL (ref 8.5–10.1)
CHLORIDE SERPL-SCNC: 110 MMOL/L (ref 97–108)
CO2 SERPL-SCNC: 23 MMOL/L (ref 21–32)
COLLECT DATE STL: NORMAL
CREAT SERPL-MCNC: 3.62 MG/DL (ref 0.7–1.3)
DIFFERENTIAL METHOD BLD: ABNORMAL
EOSINOPHIL # BLD: 0.5 K/UL (ref 0–0.4)
EOSINOPHIL NFR BLD: 6 % (ref 0–7)
ERYTHROCYTE [DISTWIDTH] IN BLOOD BY AUTOMATED COUNT: 15.6 % (ref 11.5–14.5)
GLOBULIN SER CALC-MCNC: 4.1 G/DL (ref 2–4)
GLUCOSE SERPL-MCNC: 126 MG/DL (ref 65–100)
HCT VFR BLD AUTO: 22 % (ref 36.6–50.3)
HEMOCCULT SP1 STL QL: NEGATIVE
HGB BLD-MCNC: 7.2 G/DL (ref 12.1–17)
IMM GRANULOCYTES # BLD AUTO: 0 K/UL (ref 0–0.04)
IMM GRANULOCYTES NFR BLD AUTO: 0 % (ref 0–0.5)
LYMPHOCYTES # BLD: 0.8 K/UL (ref 0.8–3.5)
LYMPHOCYTES NFR BLD: 10 % (ref 12–49)
MCH RBC QN AUTO: 30.9 PG (ref 26–34)
MCHC RBC AUTO-ENTMCNC: 32.7 G/DL (ref 30–36.5)
MCV RBC AUTO: 94.4 FL (ref 80–99)
MONOCYTES # BLD: 0.4 K/UL (ref 0–1)
MONOCYTES NFR BLD: 5 % (ref 5–13)
NEUTS SEG # BLD: 6.3 K/UL (ref 1.8–8)
NEUTS SEG NFR BLD: 79 % (ref 32–75)
NRBC # BLD: 0 K/UL (ref 0–0.01)
NRBC BLD-RTO: 0 PER 100 WBC
PLATELET # BLD AUTO: 347 K/UL (ref 150–400)
PMV BLD AUTO: 9.3 FL (ref 8.9–12.9)
POTASSIUM SERPL-SCNC: 4.7 MMOL/L (ref 3.5–5.1)
PROT SERPL-MCNC: 6.2 G/DL (ref 6.4–8.2)
RBC # BLD AUTO: 2.33 M/UL (ref 4.1–5.7)
SODIUM SERPL-SCNC: 140 MMOL/L (ref 136–145)
SPECIMEN EXP DATE BLD: NORMAL
WBC # BLD AUTO: 8.1 K/UL (ref 4.1–11.1)

## 2024-06-23 PROCEDURE — 80053 COMPREHEN METABOLIC PANEL: CPT

## 2024-06-23 PROCEDURE — 36415 COLL VENOUS BLD VENIPUNCTURE: CPT

## 2024-06-23 PROCEDURE — 85025 COMPLETE CBC W/AUTO DIFF WBC: CPT

## 2024-06-23 PROCEDURE — 99284 EMERGENCY DEPT VISIT MOD MDM: CPT

## 2024-06-23 PROCEDURE — 82272 OCCULT BLD FECES 1-3 TESTS: CPT

## 2024-06-23 PROCEDURE — 86901 BLOOD TYPING SEROLOGIC RH(D): CPT

## 2024-06-23 PROCEDURE — 86900 BLOOD TYPING SEROLOGIC ABO: CPT

## 2024-06-23 PROCEDURE — 86850 RBC ANTIBODY SCREEN: CPT

## 2024-06-23 NOTE — ED PROVIDER NOTES
History     Smoking Tobacco Use: Never     Smokeless Tobacco Use: Never     Passive Exposure: Not on file   Alcohol Use: Not At Risk (6/17/2024)    AUDIT-C     Frequency of Alcohol Consumption: Never     Average Number of Drinks: Patient does not drink     Frequency of Binge Drinking: Never   Financial Resource Strain: Low Risk  (10/3/2023)    Overall Financial Resource Strain (CARDIA)     Difficulty of Paying Living Expenses: Not hard at all   Food Insecurity: No Food Insecurity (5/30/2024)    Hunger Vital Sign     Worried About Running Out of Food in the Last Year: Never true     Ran Out of Food in the Last Year: Never true   Transportation Needs: No Transportation Needs (5/30/2024)    PRAPARE - Transportation     Lack of Transportation (Medical): No     Lack of Transportation (Non-Medical): No   Physical Activity: Not on file   Stress: Not on file   Social Connections: Not on file   Intimate Partner Violence: Not on file   Depression: Not at risk (10/3/2023)    PHQ-2     PHQ-2 Score: 0   Housing Stability: High Risk (5/30/2024)    Housing Stability Vital Sign     Unable to Pay for Housing in the Last Year: No     Number of Places Lived in the Last Year: 4     Unstable Housing in the Last Year: No   Interpersonal Safety: Not At Risk (6/17/2024)    Interpersonal Safety Domain Source: IP Abuse Screening     Physical abuse: Denies     Verbal abuse: Denies     Emotional abuse: Denies     Financial abuse: Denies     Sexual abuse: Denies   Utilities: Not At Risk (5/30/2024)    [facility] Utilities     Threatened with loss of utilities: No       Physical Exam     Vitals:  I reviewed the patient's vital signs  Vitals:    06/23/24 1259 06/23/24 1314 06/23/24 1329 06/23/24 1344   BP: (!) 181/88 (!) 167/93 (!) 172/85 (!) 175/85   Pulse: 74 72 70 73   Resp: 19 19 19 19   Temp:       SpO2:       Weight:       Height:           Physical Exam  Vitals and nursing note reviewed.   HENT:      Head: Normocephalic.      Nose: Nose normal.

## 2024-06-23 NOTE — ED NOTES
RN reviewed discharge instructions with the patient.  The patient verbalized understanding.  Wheelchair  on discharge.

## 2024-06-23 NOTE — DISCHARGE INSTRUCTIONS
13 %    Eosinophils % 6 0 - 7 %    Basophils % 0 0 - 1 %    Immature Granulocytes % 0 0 - 0.5 %    Neutrophils Absolute 6.3 1.8 - 8.0 K/UL    Lymphocytes Absolute 0.8 0.8 - 3.5 K/UL    Monocytes Absolute 0.4 0.0 - 1.0 K/UL    Eosinophils Absolute 0.5 (H) 0.0 - 0.4 K/UL    Basophils Absolute 0.0 0.0 - 0.1 K/UL    Immature Granulocytes Absolute 0.0 0.00 - 0.04 K/UL    Differential Type AUTOMATED     TYPE AND SCREEN    Collection Time: 06/23/24  8:56 AM   Result Value Ref Range    Crossmatch expiration date 06/26/2024,2359     ABO/Rh A Positive     Antibody Screen Negative    Occult Blood, Fecal    Collection Time: 06/23/24  9:14 AM   Result Value Ref Range    Occult Blood, Stool #1 Negative Negative      Date 6/23/2024        Radiologic Studies -   No orders to display     [unfilled]  [unfilled]       If you feel that you have not received excellent quality care or timely care, please ask to speak to the nurse manager. Please choose us in the future for your continued health care needs.   ------------------------------------------------------------------------------------------------------------  The exam and treatment you received in the Emergency Department were for an urgent problem and are not intended as complete care. It is very important that you follow-up with a doctor, nurse practitioner, or physician assistant in a timely manner to:  (1) confirm your diagnosis and review all imaging and lab results,  (2) re-evaluation of changes in your illness and treatment, and  (3) for ongoing care.  If your symptoms become worse or you do not improve as expected and you are unable to reach your usual health care provider, you should return to the Emergency Department. We are available 24 hours a day.     Please take your discharge instructions with you when you go to your follow-up appointment.     If a prescription has been provided, please have it filled as soon as possible to prevent a delay in treatment. Read the entire

## 2024-06-23 NOTE — ED NOTES
TRANSFER - OUT REPORT:    Verbal report given to Mireille on Kvng Arellano  being transferred to [facility] for routine progression of patient care       Report consisted of patient's Situation, Background, Assessment and   Recommendations(SBAR).     Information from the following report(s) Index, Adult Overview, Intake/Output, MAR, and Recent Results was reviewed with the receiving nurse.    [place] Fall Assessment:                           Lines:   Peripheral IV 06/23/24 Left;Ventral Wrist (Active)        Opportunity for questions and clarification was provided.      Patient transported with:

## 2024-09-01 ENCOUNTER — APPOINTMENT (OUTPATIENT)
Facility: HOSPITAL | Age: 64
DRG: 368 | End: 2024-09-01
Payer: MEDICARE

## 2024-09-01 ENCOUNTER — HOSPITAL ENCOUNTER (OUTPATIENT)
Facility: HOSPITAL | Age: 64
Setting detail: OBSERVATION
Discharge: NURSING, REHAB OR LONG-TERM CARE | DRG: 368 | End: 2024-09-05
Attending: EMERGENCY MEDICINE
Payer: MEDICARE

## 2024-09-01 DIAGNOSIS — T68.XXXA HYPOTHERMIA, INITIAL ENCOUNTER: ICD-10-CM

## 2024-09-01 DIAGNOSIS — R09.02 HYPOXEMIA: Primary | ICD-10-CM

## 2024-09-01 DIAGNOSIS — K92.2 GASTROINTESTINAL HEMORRHAGE, UNSPECIFIED GASTROINTESTINAL HEMORRHAGE TYPE: ICD-10-CM

## 2024-09-01 LAB
ABO + RH BLD: NORMAL
ALBUMIN SERPL-MCNC: 2.9 G/DL (ref 3.5–5)
ALBUMIN/GLOB SERPL: 0.6 (ref 1.1–2.2)
ALP SERPL-CCNC: 114 U/L (ref 45–117)
ALT SERPL-CCNC: 11 U/L (ref 12–78)
ANION GAP SERPL CALC-SCNC: 11 MMOL/L (ref 5–15)
APPEARANCE UR: CLEAR
AST SERPL W P-5'-P-CCNC: 13 U/L (ref 15–37)
BACTERIA URNS QL MICRO: NEGATIVE /HPF
BASOPHILS # BLD: 0 K/UL (ref 0–0.1)
BASOPHILS NFR BLD: 0 % (ref 0–1)
BILIRUB SERPL-MCNC: 0.2 MG/DL (ref 0.2–1)
BILIRUB UR QL: NEGATIVE
BLOOD GROUP ANTIBODIES SERPL: NEGATIVE
BUN SERPL-MCNC: 80 MG/DL (ref 6–20)
BUN/CREAT SERPL: 18 (ref 12–20)
CA-I BLD-MCNC: 10 MG/DL (ref 8.5–10.1)
CHLORIDE SERPL-SCNC: 111 MMOL/L (ref 97–108)
CO2 SERPL-SCNC: 24 MMOL/L (ref 21–32)
COLLECT DATE STL: NORMAL
COLOR UR: ABNORMAL
CREAT SERPL-MCNC: 4.49 MG/DL (ref 0.7–1.3)
DIFFERENTIAL METHOD BLD: ABNORMAL
EOSINOPHIL # BLD: 0.1 K/UL (ref 0–0.4)
EOSINOPHIL NFR BLD: 1 % (ref 0–7)
EPITH CASTS URNS QL MICRO: ABNORMAL /LPF
ERYTHROCYTE [DISTWIDTH] IN BLOOD BY AUTOMATED COUNT: 15.6 % (ref 11.5–14.5)
FLUAV RNA SPEC QL NAA+PROBE: NOT DETECTED
FLUBV RNA SPEC QL NAA+PROBE: NOT DETECTED
GLOBULIN SER CALC-MCNC: 4.6 G/DL (ref 2–4)
GLUCOSE SERPL-MCNC: 232 MG/DL (ref 65–100)
GLUCOSE UR STRIP.AUTO-MCNC: NEGATIVE MG/DL
HCT VFR BLD AUTO: 26.4 % (ref 36.6–50.3)
HEMOCCULT SP1 STL QL: NEGATIVE
HGB BLD-MCNC: 8.4 G/DL (ref 12.1–17)
HGB UR QL STRIP: ABNORMAL
IMM GRANULOCYTES # BLD AUTO: 0.1 K/UL (ref 0–0.04)
IMM GRANULOCYTES NFR BLD AUTO: 1 % (ref 0–0.5)
INR PPP: 1 (ref 0.9–1.1)
KETONES UR QL STRIP.AUTO: NEGATIVE MG/DL
LACTATE BLD-SCNC: 0.94 MMOL/L (ref 0.4–2)
LACTATE BLD-SCNC: 2.72 MMOL/L (ref 0.4–2)
LEUKOCYTE ESTERASE UR QL STRIP.AUTO: NEGATIVE
LYMPHOCYTES # BLD: 0.7 K/UL (ref 0.8–3.5)
LYMPHOCYTES NFR BLD: 6 % (ref 12–49)
MCH RBC QN AUTO: 29.9 PG (ref 26–34)
MCHC RBC AUTO-ENTMCNC: 31.8 G/DL (ref 30–36.5)
MCV RBC AUTO: 94 FL (ref 80–99)
MONOCYTES # BLD: 0.3 K/UL (ref 0–1)
MONOCYTES NFR BLD: 3 % (ref 5–13)
NEUTS SEG # BLD: 9.6 K/UL (ref 1.8–8)
NEUTS SEG NFR BLD: 89 % (ref 32–75)
NITRITE UR QL STRIP.AUTO: NEGATIVE
NRBC # BLD: 0 K/UL (ref 0–0.01)
NRBC BLD-RTO: 0 PER 100 WBC
PERFORMED BY:: ABNORMAL
PERFORMED BY:: NORMAL
PH UR STRIP: 5 (ref 5–8)
PLATELET # BLD AUTO: 352 K/UL (ref 150–400)
PMV BLD AUTO: 9.2 FL (ref 8.9–12.9)
POTASSIUM SERPL-SCNC: 4.7 MMOL/L (ref 3.5–5.1)
PROT SERPL-MCNC: 7.5 G/DL (ref 6.4–8.2)
PROT UR STRIP-MCNC: 100 MG/DL
PROTHROMBIN TIME: 13.7 SEC (ref 11.9–14.6)
RBC # BLD AUTO: 2.81 M/UL (ref 4.1–5.7)
RBC #/AREA URNS HPF: ABNORMAL /HPF (ref 0–5)
SARS-COV-2 RNA RESP QL NAA+PROBE: NOT DETECTED
SODIUM SERPL-SCNC: 146 MMOL/L (ref 136–145)
SP GR UR REFRACTOMETRY: 1.01 (ref 1–1.03)
SPECIMEN EXP DATE BLD: NORMAL
UROBILINOGEN UR QL STRIP.AUTO: 0.1 EU/DL (ref 0.1–1)
WBC # BLD AUTO: 10.8 K/UL (ref 4.1–11.1)
WBC URNS QL MICRO: ABNORMAL /HPF (ref 0–4)

## 2024-09-01 PROCEDURE — 80053 COMPREHEN METABOLIC PANEL: CPT

## 2024-09-01 PROCEDURE — 82272 OCCULT BLD FECES 1-3 TESTS: CPT

## 2024-09-01 PROCEDURE — 81001 URINALYSIS AUTO W/SCOPE: CPT

## 2024-09-01 PROCEDURE — 83605 ASSAY OF LACTIC ACID: CPT

## 2024-09-01 PROCEDURE — 71250 CT THORAX DX C-: CPT

## 2024-09-01 PROCEDURE — 96372 THER/PROPH/DIAG INJ SC/IM: CPT

## 2024-09-01 PROCEDURE — 36415 COLL VENOUS BLD VENIPUNCTURE: CPT

## 2024-09-01 PROCEDURE — 96365 THER/PROPH/DIAG IV INF INIT: CPT

## 2024-09-01 PROCEDURE — 6360000002 HC RX W HCPCS: Performed by: EMERGENCY MEDICINE

## 2024-09-01 PROCEDURE — 96374 THER/PROPH/DIAG INJ IV PUSH: CPT

## 2024-09-01 PROCEDURE — 2580000003 HC RX 258: Performed by: EMERGENCY MEDICINE

## 2024-09-01 PROCEDURE — 71045 X-RAY EXAM CHEST 1 VIEW: CPT

## 2024-09-01 PROCEDURE — 99285 EMERGENCY DEPT VISIT HI MDM: CPT

## 2024-09-01 PROCEDURE — 86900 BLOOD TYPING SEROLOGIC ABO: CPT

## 2024-09-01 PROCEDURE — 93005 ELECTROCARDIOGRAM TRACING: CPT | Performed by: EMERGENCY MEDICINE

## 2024-09-01 PROCEDURE — 87040 BLOOD CULTURE FOR BACTERIA: CPT

## 2024-09-01 PROCEDURE — 86850 RBC ANTIBODY SCREEN: CPT

## 2024-09-01 PROCEDURE — 96375 TX/PRO/DX INJ NEW DRUG ADDON: CPT

## 2024-09-01 PROCEDURE — 85610 PROTHROMBIN TIME: CPT

## 2024-09-01 PROCEDURE — 85025 COMPLETE CBC W/AUTO DIFF WBC: CPT

## 2024-09-01 PROCEDURE — 87636 SARSCOV2 & INF A&B AMP PRB: CPT

## 2024-09-01 PROCEDURE — 84439 ASSAY OF FREE THYROXINE: CPT

## 2024-09-01 PROCEDURE — 86901 BLOOD TYPING SEROLOGIC RH(D): CPT

## 2024-09-01 PROCEDURE — 84443 ASSAY THYROID STIM HORMONE: CPT

## 2024-09-01 RX ORDER — ONDANSETRON 2 MG/ML
4 INJECTION INTRAMUSCULAR; INTRAVENOUS ONCE
Status: COMPLETED | OUTPATIENT
Start: 2024-09-01 | End: 2024-09-01

## 2024-09-01 RX ORDER — PANTOPRAZOLE SODIUM 40 MG/10ML
40 INJECTION, POWDER, LYOPHILIZED, FOR SOLUTION INTRAVENOUS DAILY
Status: DISCONTINUED | OUTPATIENT
Start: 2024-09-01 | End: 2024-09-02

## 2024-09-01 RX ORDER — OCTREOTIDE ACETATE 50 UG/ML
50 INJECTION, SOLUTION INTRAVENOUS; SUBCUTANEOUS ONCE
Status: COMPLETED | OUTPATIENT
Start: 2024-09-01 | End: 2024-09-01

## 2024-09-01 RX ADMIN — AZITHROMYCIN DIHYDRATE 500 MG: 500 INJECTION, POWDER, LYOPHILIZED, FOR SOLUTION INTRAVENOUS at 21:54

## 2024-09-01 RX ADMIN — ONDANSETRON 4 MG: 2 INJECTION INTRAMUSCULAR; INTRAVENOUS at 18:38

## 2024-09-01 RX ADMIN — CEFTRIAXONE SODIUM 1000 MG: 1 INJECTION, POWDER, FOR SOLUTION INTRAMUSCULAR; INTRAVENOUS at 21:26

## 2024-09-01 RX ADMIN — OCTREOTIDE ACETATE 50 MCG: 50 INJECTION, SOLUTION INTRAVENOUS; SUBCUTANEOUS at 19:19

## 2024-09-01 RX ADMIN — PANTOPRAZOLE SODIUM 40 MG: 40 INJECTION, POWDER, FOR SOLUTION INTRAVENOUS at 18:30

## 2024-09-01 ASSESSMENT — PAIN SCALES - GENERAL
PAINLEVEL_OUTOF10: 0
PAINLEVEL_OUTOF10: 0

## 2024-09-01 ASSESSMENT — PAIN - FUNCTIONAL ASSESSMENT: PAIN_FUNCTIONAL_ASSESSMENT: 0-10

## 2024-09-01 ASSESSMENT — LIFESTYLE VARIABLES
HOW OFTEN DO YOU HAVE A DRINK CONTAINING ALCOHOL: PATIENT UNABLE TO ANSWER
HOW MANY STANDARD DRINKS CONTAINING ALCOHOL DO YOU HAVE ON A TYPICAL DAY: PATIENT UNABLE TO ANSWER

## 2024-09-01 NOTE — ED PROVIDER NOTES
[facility] EMERGENCY DEPT  EMERGENCY DEPARTMENT HISTORY AND PHYSICAL EXAM      Date: 9/1/2024  Patient Name: Kvng Arellano  MRN: [id number]  Birthdate 1960  Date of evaluation: 9/1/2024  Provider: Dwayne Canales MD   Note Started: 6:29 PM EDT 9/1/24    HISTORY OF PRESENT ILLNESS     Chief Complaint   Patient presents with    GI Bleeding       History Provided By: Patient    HPI: Kvng Arellano is a 64 y.o. male comes from [facility] and rehab altered vomiting dark-colored vomitus and having black stools.  Facility was transferred here with concern for GI bleed.  Patient's not able to provide any history at this point.  There were no treatments prior to arrival.  Patient was also hypoxic.  Placed on nonrebreather oxygen saturations were at 93%.    PAST MEDICAL HISTORY   Past Medical History:  Past Medical History:   Diagnosis Date    CVS disease     Diabetes (HCC)     High cholesterol     HTN (hypertension)        Past Surgical History:  Past Surgical History:   Procedure Laterality Date    HERNIA REPAIR         Family History:  History reviewed. No pertinent family history.    Social History:  Social History     Tobacco Use    Smoking status: Never    Smokeless tobacco: Never   Vaping Use    Vaping status: Never Used   Substance Use Topics    Alcohol use: Never    Drug use: Never       Allergies:  Allergies   Allergen Reactions    Ace Inhibitors Swelling    Bee Venom      Other reaction(s): other/intolerance    Iodine Other (See Comments)    Sulfa Antibiotics Itching       PCP: Karen Samano APRN - NP    Current Meds:   Current Facility-Administered Medications   Medication Dose Route Frequency Provider Last Rate Last Admin    pantoprazole (PROTONIX) injection 40 mg  40 mg IntraVENous Daily Dwayne Canales MD   40 mg at 09/01/24 1830    azithromycin (ZITHROMAX) 500 mg in sodium chloride 0.9 % 250 mL IVPB ([id number])  500 mg IntraVENous NOW Dwayne Canales  mL/hr at 09/01/24 6950

## 2024-09-01 NOTE — ED TRIAGE NOTES
From [facility]    EMS was called for GI bleed- pt was found to be bleeding rectally and per staff, had dark vomit.    Nasal trumpet in place with NRB at 15L

## 2024-09-01 NOTE — ED NOTES
Patient maintained oxygen saturation >95 % on 10 L NRB mask; NBR remove and replaced with NC 6L. Patient tolerating well at this time.     Right nasal trumpet placed by EMS remains intact.     Dr. Canales notified.

## 2024-09-02 ENCOUNTER — ANESTHESIA EVENT (OUTPATIENT)
Facility: HOSPITAL | Age: 64
DRG: 368 | End: 2024-09-02
Payer: MEDICARE

## 2024-09-02 ENCOUNTER — ANESTHESIA (OUTPATIENT)
Facility: HOSPITAL | Age: 64
DRG: 368 | End: 2024-09-02
Payer: MEDICARE

## 2024-09-02 PROBLEM — T68.XXXA HYPOTHERMIA: Status: ACTIVE | Noted: 2024-09-02

## 2024-09-02 PROBLEM — T68.XXXA HYPOTHERMIA, INITIAL ENCOUNTER: Status: ACTIVE | Noted: 2024-09-02

## 2024-09-02 PROBLEM — E03.9 HYPOTHYROIDISM: Chronic | Status: ACTIVE | Noted: 2024-09-02

## 2024-09-02 PROBLEM — T68.XXXA HYPOTHERMIA, INITIAL ENCOUNTER: Status: RESOLVED | Noted: 2024-09-02 | Resolved: 2024-09-02

## 2024-09-02 PROBLEM — E11.9 TYPE 2 DIABETES MELLITUS (HCC): Chronic | Status: ACTIVE | Noted: 2024-09-02

## 2024-09-02 PROBLEM — D50.9 IRON DEFICIENCY ANEMIA: Status: ACTIVE | Noted: 2024-09-02

## 2024-09-02 PROBLEM — E86.0 DEHYDRATION: Status: ACTIVE | Noted: 2024-09-02

## 2024-09-02 LAB
ANION GAP SERPL CALC-SCNC: 8 MMOL/L (ref 5–15)
BASOPHILS # BLD: 0 K/UL (ref 0–0.1)
BASOPHILS NFR BLD: 0 % (ref 0–1)
BUN SERPL-MCNC: 78 MG/DL (ref 6–20)
BUN/CREAT SERPL: 18 (ref 12–20)
CA-I BLD-MCNC: 9.2 MG/DL (ref 8.5–10.1)
CHLORIDE SERPL-SCNC: 115 MMOL/L (ref 97–108)
CO2 SERPL-SCNC: 23 MMOL/L (ref 21–32)
CORTIS SERPL-MCNC: 29.1 UG/DL
CREAT SERPL-MCNC: 4.23 MG/DL (ref 0.7–1.3)
DIFFERENTIAL METHOD BLD: ABNORMAL
EKG ATRIAL RATE: 78 BPM
EKG DIAGNOSIS: NORMAL
EKG P AXIS: 77 DEGREES
EKG P-R INTERVAL: 146 MS
EKG Q-T INTERVAL: 416 MS
EKG QRS DURATION: 112 MS
EKG QTC CALCULATION (BAZETT): 474 MS
EKG R AXIS: 56 DEGREES
EKG T AXIS: 81 DEGREES
EKG VENTRICULAR RATE: 78 BPM
EOSINOPHIL # BLD: 0.4 K/UL (ref 0–0.4)
EOSINOPHIL NFR BLD: 2 % (ref 0–7)
ERYTHROCYTE [DISTWIDTH] IN BLOOD BY AUTOMATED COUNT: 15.8 % (ref 11.5–14.5)
FOLATE SERPL-MCNC: 5.2 NG/ML (ref 5–21)
GLUCOSE BLD STRIP.AUTO-MCNC: 133 MG/DL (ref 65–100)
GLUCOSE BLD STRIP.AUTO-MCNC: 142 MG/DL (ref 65–100)
GLUCOSE BLD STRIP.AUTO-MCNC: 149 MG/DL (ref 65–100)
GLUCOSE BLD STRIP.AUTO-MCNC: 170 MG/DL (ref 65–100)
GLUCOSE BLD STRIP.AUTO-MCNC: 185 MG/DL (ref 65–100)
GLUCOSE BLD STRIP.AUTO-MCNC: 199 MG/DL (ref 65–100)
GLUCOSE SERPL-MCNC: 137 MG/DL (ref 65–100)
HCT VFR BLD AUTO: 24.7 % (ref 36.6–50.3)
HGB BLD-MCNC: 8.1 G/DL (ref 12.1–17)
IMM GRANULOCYTES # BLD AUTO: 0.1 K/UL (ref 0–0.04)
IMM GRANULOCYTES NFR BLD AUTO: 1 % (ref 0–0.5)
IRON SATN MFR SERPL: 9 % (ref 20–50)
IRON SERPL-MCNC: 18 UG/DL (ref 35–150)
LACTATE SERPL-SCNC: 2.3 MMOL/L (ref 0.4–2)
LYMPHOCYTES # BLD: 0.5 K/UL (ref 0.8–3.5)
LYMPHOCYTES NFR BLD: 3 % (ref 12–49)
MCH RBC QN AUTO: 30.5 PG (ref 26–34)
MCHC RBC AUTO-ENTMCNC: 32.8 G/DL (ref 30–36.5)
MCV RBC AUTO: 92.9 FL (ref 80–99)
MONOCYTES # BLD: 0.8 K/UL (ref 0–1)
MONOCYTES NFR BLD: 4 % (ref 5–13)
NEUTS SEG # BLD: 17.8 K/UL (ref 1.8–8)
NEUTS SEG NFR BLD: 90 % (ref 32–75)
NRBC # BLD: 0 K/UL (ref 0–0.01)
NRBC BLD-RTO: 0 PER 100 WBC
PERFORMED BY:: ABNORMAL
PLATELET # BLD AUTO: 363 K/UL (ref 150–400)
PMV BLD AUTO: 9.1 FL (ref 8.9–12.9)
POTASSIUM SERPL-SCNC: 5 MMOL/L (ref 3.5–5.1)
RBC # BLD AUTO: 2.66 M/UL (ref 4.1–5.7)
SODIUM SERPL-SCNC: 146 MMOL/L (ref 136–145)
T4 FREE SERPL-MCNC: 0.7 NG/DL (ref 0.8–1.5)
TIBC SERPL-MCNC: 197 UG/DL (ref 250–450)
TSH SERPL DL<=0.05 MIU/L-ACNC: 1.77 UIU/ML (ref 0.36–3.74)
VIT B12 SERPL-MCNC: 529 PG/ML (ref 193–986)
WBC # BLD AUTO: 19.6 K/UL (ref 4.1–11.1)

## 2024-09-02 PROCEDURE — 7100000000 HC PACU RECOVERY - FIRST 15 MIN: Performed by: INTERNAL MEDICINE

## 2024-09-02 PROCEDURE — 96375 TX/PRO/DX INJ NEW DRUG ADDON: CPT

## 2024-09-02 PROCEDURE — 2580000003 HC RX 258: Performed by: NURSE ANESTHETIST, CERTIFIED REGISTERED

## 2024-09-02 PROCEDURE — 6360000002 HC RX W HCPCS: Performed by: EMERGENCY MEDICINE

## 2024-09-02 PROCEDURE — 3700000000 HC ANESTHESIA ATTENDED CARE: Performed by: INTERNAL MEDICINE

## 2024-09-02 PROCEDURE — 6370000000 HC RX 637 (ALT 250 FOR IP): Performed by: INTERNAL MEDICINE

## 2024-09-02 PROCEDURE — G0378 HOSPITAL OBSERVATION PER HR: HCPCS

## 2024-09-02 PROCEDURE — 7100000001 HC PACU RECOVERY - ADDTL 15 MIN: Performed by: INTERNAL MEDICINE

## 2024-09-02 PROCEDURE — 2500000003 HC RX 250 WO HCPCS: Performed by: NURSE ANESTHETIST, CERTIFIED REGISTERED

## 2024-09-02 PROCEDURE — 96376 TX/PRO/DX INJ SAME DRUG ADON: CPT

## 2024-09-02 PROCEDURE — 0DB38ZX EXCISION OF LOWER ESOPHAGUS, VIA NATURAL OR ARTIFICIAL OPENING ENDOSCOPIC, DIAGNOSTIC: ICD-10-PCS | Performed by: INTERNAL MEDICINE

## 2024-09-02 PROCEDURE — 3600007512: Performed by: INTERNAL MEDICINE

## 2024-09-02 PROCEDURE — 83540 ASSAY OF IRON: CPT

## 2024-09-02 PROCEDURE — 88305 TISSUE EXAM BY PATHOLOGIST: CPT

## 2024-09-02 PROCEDURE — 2580000003 HC RX 258: Performed by: INTERNAL MEDICINE

## 2024-09-02 PROCEDURE — 82746 ASSAY OF FOLIC ACID SERUM: CPT

## 2024-09-02 PROCEDURE — 6360000002 HC RX W HCPCS: Performed by: INTERNAL MEDICINE

## 2024-09-02 PROCEDURE — 82533 TOTAL CORTISOL: CPT

## 2024-09-02 PROCEDURE — 82962 GLUCOSE BLOOD TEST: CPT

## 2024-09-02 PROCEDURE — 6360000002 HC RX W HCPCS: Performed by: NURSE ANESTHETIST, CERTIFIED REGISTERED

## 2024-09-02 PROCEDURE — 36415 COLL VENOUS BLD VENIPUNCTURE: CPT

## 2024-09-02 PROCEDURE — 80048 BASIC METABOLIC PNL TOTAL CA: CPT

## 2024-09-02 PROCEDURE — 85025 COMPLETE CBC W/AUTO DIFF WBC: CPT

## 2024-09-02 PROCEDURE — 3600007502: Performed by: INTERNAL MEDICINE

## 2024-09-02 PROCEDURE — 82607 VITAMIN B-12: CPT

## 2024-09-02 PROCEDURE — C1889 IMPLANT/INSERT DEVICE, NOC: HCPCS | Performed by: INTERNAL MEDICINE

## 2024-09-02 PROCEDURE — 83605 ASSAY OF LACTIC ACID: CPT

## 2024-09-02 PROCEDURE — 3700000001 HC ADD 15 MINUTES (ANESTHESIA): Performed by: INTERNAL MEDICINE

## 2024-09-02 DEVICE — WORKING LENGTH 235CM, WORKING CHANNEL 2.8MM
Type: IMPLANTABLE DEVICE | Site: ESOPHAGUS | Status: FUNCTIONAL
Brand: RESOLUTION 360 CLIP

## 2024-09-02 RX ORDER — HYDRALAZINE HYDROCHLORIDE 25 MG/1
25 TABLET, FILM COATED ORAL 3 TIMES DAILY
Status: DISCONTINUED | OUTPATIENT
Start: 2024-09-02 | End: 2024-09-05 | Stop reason: HOSPADM

## 2024-09-02 RX ORDER — SODIUM CHLORIDE 9 MG/ML
INJECTION, SOLUTION INTRAVENOUS PRN
Status: DISCONTINUED | OUTPATIENT
Start: 2024-09-02 | End: 2024-09-05 | Stop reason: HOSPADM

## 2024-09-02 RX ORDER — INSULIN GLARGINE 100 [IU]/ML
10 INJECTION, SOLUTION SUBCUTANEOUS NIGHTLY
Status: DISCONTINUED | OUTPATIENT
Start: 2024-09-02 | End: 2024-09-04

## 2024-09-02 RX ORDER — LANOLIN ALCOHOL/MO/W.PET/CERES
3 CREAM (GRAM) TOPICAL DAILY
Status: DISCONTINUED | OUTPATIENT
Start: 2024-09-02 | End: 2024-09-05 | Stop reason: HOSPADM

## 2024-09-02 RX ORDER — ATORVASTATIN CALCIUM 40 MG/1
40 TABLET, FILM COATED ORAL DAILY
Status: DISCONTINUED | OUTPATIENT
Start: 2024-09-02 | End: 2024-09-05 | Stop reason: HOSPADM

## 2024-09-02 RX ORDER — PROPOFOL 10 MG/ML
INJECTION, EMULSION INTRAVENOUS
Status: COMPLETED
Start: 2024-09-02 | End: 2024-09-02

## 2024-09-02 RX ORDER — INSULIN LISPRO 100 [IU]/ML
0-4 INJECTION, SOLUTION INTRAVENOUS; SUBCUTANEOUS
Status: DISCONTINUED | OUTPATIENT
Start: 2024-09-02 | End: 2024-09-05 | Stop reason: HOSPADM

## 2024-09-02 RX ORDER — SODIUM CHLORIDE 0.9 % (FLUSH) 0.9 %
5-40 SYRINGE (ML) INJECTION PRN
Status: DISCONTINUED | OUTPATIENT
Start: 2024-09-02 | End: 2024-09-05 | Stop reason: HOSPADM

## 2024-09-02 RX ORDER — LIDOCAINE HYDROCHLORIDE 20 MG/ML
INJECTION, SOLUTION EPIDURAL; INFILTRATION; INTRACAUDAL; PERINEURAL
Status: COMPLETED
Start: 2024-09-02 | End: 2024-09-02

## 2024-09-02 RX ORDER — LORAZEPAM 2 MG/ML
0.5 INJECTION INTRAMUSCULAR EVERY 8 HOURS PRN
Status: DISCONTINUED | OUTPATIENT
Start: 2024-09-02 | End: 2024-09-05 | Stop reason: HOSPADM

## 2024-09-02 RX ORDER — CLOPIDOGREL BISULFATE 75 MG/1
75 TABLET ORAL DAILY
Status: DISCONTINUED | OUTPATIENT
Start: 2024-09-02 | End: 2024-09-05 | Stop reason: HOSPADM

## 2024-09-02 RX ORDER — HYDRALAZINE HYDROCHLORIDE 20 MG/ML
10 INJECTION INTRAMUSCULAR; INTRAVENOUS
Status: COMPLETED | OUTPATIENT
Start: 2024-09-02 | End: 2024-09-02

## 2024-09-02 RX ORDER — LEVOTHYROXINE SODIUM 25 UG/1
25 TABLET ORAL DAILY
Status: DISCONTINUED | OUTPATIENT
Start: 2024-09-02 | End: 2024-09-05 | Stop reason: HOSPADM

## 2024-09-02 RX ORDER — INSULIN LISPRO 100 [IU]/ML
0-4 INJECTION, SOLUTION INTRAVENOUS; SUBCUTANEOUS NIGHTLY
Status: DISCONTINUED | OUTPATIENT
Start: 2024-09-02 | End: 2024-09-05 | Stop reason: HOSPADM

## 2024-09-02 RX ORDER — TAMSULOSIN HYDROCHLORIDE 0.4 MG/1
0.4 CAPSULE ORAL DAILY
Status: DISCONTINUED | OUTPATIENT
Start: 2024-09-02 | End: 2024-09-05 | Stop reason: HOSPADM

## 2024-09-02 RX ORDER — ETOMIDATE 2 MG/ML
INJECTION INTRAVENOUS
Status: COMPLETED
Start: 2024-09-02 | End: 2024-09-02

## 2024-09-02 RX ORDER — ASPIRIN 81 MG/1
81 TABLET ORAL DAILY
Status: DISCONTINUED | OUTPATIENT
Start: 2024-09-02 | End: 2024-09-02

## 2024-09-02 RX ORDER — ACETAMINOPHEN 650 MG/1
650 SUPPOSITORY RECTAL EVERY 6 HOURS PRN
Status: DISCONTINUED | OUTPATIENT
Start: 2024-09-02 | End: 2024-09-05 | Stop reason: HOSPADM

## 2024-09-02 RX ORDER — ONDANSETRON 2 MG/ML
4 INJECTION INTRAMUSCULAR; INTRAVENOUS EVERY 6 HOURS PRN
Status: DISCONTINUED | OUTPATIENT
Start: 2024-09-02 | End: 2024-09-05 | Stop reason: HOSPADM

## 2024-09-02 RX ORDER — GLYCOPYRROLATE 0.2 MG/ML
INJECTION INTRAMUSCULAR; INTRAVENOUS
Status: DISPENSED
Start: 2024-09-02 | End: 2024-09-03

## 2024-09-02 RX ORDER — SUCCINYLCHOLINE/SOD CL,ISO/PF 200MG/10ML
SYRINGE (ML) INTRAVENOUS
Status: COMPLETED
Start: 2024-09-02 | End: 2024-09-02

## 2024-09-02 RX ORDER — METOPROLOL TARTRATE 25 MG/1
12.5 TABLET, FILM COATED ORAL 2 TIMES DAILY
Status: DISCONTINUED | OUTPATIENT
Start: 2024-09-02 | End: 2024-09-05 | Stop reason: HOSPADM

## 2024-09-02 RX ORDER — SODIUM CHLORIDE, SODIUM LACTATE, POTASSIUM CHLORIDE, CALCIUM CHLORIDE 600; 310; 30; 20 MG/100ML; MG/100ML; MG/100ML; MG/100ML
INJECTION, SOLUTION INTRAVENOUS CONTINUOUS
Status: DISCONTINUED | OUTPATIENT
Start: 2024-09-02 | End: 2024-09-05 | Stop reason: HOSPADM

## 2024-09-02 RX ORDER — SODIUM CHLORIDE, SODIUM LACTATE, POTASSIUM CHLORIDE, CALCIUM CHLORIDE 600; 310; 30; 20 MG/100ML; MG/100ML; MG/100ML; MG/100ML
INJECTION, SOLUTION INTRAVENOUS CONTINUOUS PRN
Status: DISCONTINUED | OUTPATIENT
Start: 2024-09-02 | End: 2024-09-02 | Stop reason: SDUPTHER

## 2024-09-02 RX ORDER — SODIUM CHLORIDE 0.9 % (FLUSH) 0.9 %
5-40 SYRINGE (ML) INJECTION EVERY 12 HOURS SCHEDULED
Status: DISCONTINUED | OUTPATIENT
Start: 2024-09-02 | End: 2024-09-05 | Stop reason: HOSPADM

## 2024-09-02 RX ORDER — HYDRALAZINE HYDROCHLORIDE 20 MG/ML
10 INJECTION INTRAMUSCULAR; INTRAVENOUS ONCE
Status: COMPLETED | OUTPATIENT
Start: 2024-09-02 | End: 2024-09-02

## 2024-09-02 RX ORDER — POLYETHYLENE GLYCOL 3350 17 G/17G
17 POWDER, FOR SOLUTION ORAL DAILY PRN
Status: DISCONTINUED | OUTPATIENT
Start: 2024-09-02 | End: 2024-09-05 | Stop reason: HOSPADM

## 2024-09-02 RX ORDER — ONDANSETRON 4 MG/1
4 TABLET, ORALLY DISINTEGRATING ORAL EVERY 8 HOURS PRN
Status: DISCONTINUED | OUTPATIENT
Start: 2024-09-02 | End: 2024-09-05 | Stop reason: HOSPADM

## 2024-09-02 RX ORDER — ROCURONIUM BROMIDE 10 MG/ML
INJECTION, SOLUTION INTRAVENOUS
Status: DISPENSED
Start: 2024-09-02 | End: 2024-09-03

## 2024-09-02 RX ORDER — METOCLOPRAMIDE HYDROCHLORIDE 5 MG/ML
10 INJECTION INTRAMUSCULAR; INTRAVENOUS EVERY 6 HOURS PRN
Status: DISCONTINUED | OUTPATIENT
Start: 2024-09-02 | End: 2024-09-05 | Stop reason: HOSPADM

## 2024-09-02 RX ORDER — MIRTAZAPINE 15 MG/1
7.5 TABLET, ORALLY DISINTEGRATING ORAL NIGHTLY
Status: DISCONTINUED | OUTPATIENT
Start: 2024-09-02 | End: 2024-09-05 | Stop reason: HOSPADM

## 2024-09-02 RX ORDER — ACETAMINOPHEN 325 MG/1
650 TABLET ORAL EVERY 6 HOURS PRN
Status: DISCONTINUED | OUTPATIENT
Start: 2024-09-02 | End: 2024-09-05 | Stop reason: HOSPADM

## 2024-09-02 RX ORDER — SUCCINYLCHOLINE/SOD CL,ISO/PF 200MG/10ML
SYRINGE (ML) INTRAVENOUS PRN
Status: DISCONTINUED | OUTPATIENT
Start: 2024-09-02 | End: 2024-09-02 | Stop reason: SDUPTHER

## 2024-09-02 RX ORDER — ETOMIDATE 2 MG/ML
INJECTION INTRAVENOUS PRN
Status: DISCONTINUED | OUTPATIENT
Start: 2024-09-02 | End: 2024-09-02 | Stop reason: SDUPTHER

## 2024-09-02 RX ORDER — PANTOPRAZOLE SODIUM 40 MG/1
40 TABLET, DELAYED RELEASE ORAL
Status: DISCONTINUED | OUTPATIENT
Start: 2024-09-03 | End: 2024-09-05 | Stop reason: HOSPADM

## 2024-09-02 RX ORDER — TRAZODONE HYDROCHLORIDE 50 MG/1
50 TABLET, FILM COATED ORAL NIGHTLY
Status: DISCONTINUED | OUTPATIENT
Start: 2024-09-02 | End: 2024-09-05 | Stop reason: HOSPADM

## 2024-09-02 RX ORDER — AMLODIPINE BESYLATE 5 MG/1
10 TABLET ORAL DAILY
Status: DISCONTINUED | OUTPATIENT
Start: 2024-09-02 | End: 2024-09-05 | Stop reason: HOSPADM

## 2024-09-02 RX ORDER — BUPROPION HYDROCHLORIDE 150 MG/1
150 TABLET ORAL EVERY MORNING
Status: DISCONTINUED | OUTPATIENT
Start: 2024-09-02 | End: 2024-09-05 | Stop reason: HOSPADM

## 2024-09-02 RX ORDER — DONEPEZIL HYDROCHLORIDE 5 MG/1
5 TABLET, FILM COATED ORAL EVERY MORNING
Status: DISCONTINUED | OUTPATIENT
Start: 2024-09-02 | End: 2024-09-05 | Stop reason: HOSPADM

## 2024-09-02 RX ADMIN — METOPROLOL TARTRATE 12.5 MG: 25 TABLET, FILM COATED ORAL at 08:18

## 2024-09-02 RX ADMIN — PANTOPRAZOLE SODIUM 40 MG: 40 INJECTION, POWDER, FOR SOLUTION INTRAVENOUS at 08:39

## 2024-09-02 RX ADMIN — TAMSULOSIN HYDROCHLORIDE 0.4 MG: 0.4 CAPSULE ORAL at 08:18

## 2024-09-02 RX ADMIN — LORAZEPAM 0.5 MG: 2 INJECTION INTRAMUSCULAR; INTRAVENOUS at 22:17

## 2024-09-02 RX ADMIN — LIDOCAINE HYDROCHLORIDE 60 MG: 20 INJECTION, SOLUTION EPIDURAL; INFILTRATION; INTRACAUDAL; PERINEURAL at 12:36

## 2024-09-02 RX ADMIN — Medication 3 MG: at 20:28

## 2024-09-02 RX ADMIN — ETOMIDATE INJECTION 10 MG: 2 SOLUTION INTRAVENOUS at 12:36

## 2024-09-02 RX ADMIN — HYDRALAZINE HYDROCHLORIDE 10 MG: 20 INJECTION INTRAMUSCULAR; INTRAVENOUS at 12:16

## 2024-09-02 RX ADMIN — PROPOFOL 20 MG: 10 INJECTION, EMULSION INTRAVENOUS at 12:41

## 2024-09-02 RX ADMIN — HYDRALAZINE HYDROCHLORIDE 25 MG: 25 TABLET ORAL at 14:26

## 2024-09-02 RX ADMIN — ONDANSETRON 4 MG: 2 INJECTION INTRAMUSCULAR; INTRAVENOUS at 08:33

## 2024-09-02 RX ADMIN — SODIUM CHLORIDE, PRESERVATIVE FREE 10 ML: 5 INJECTION INTRAVENOUS at 08:49

## 2024-09-02 RX ADMIN — Medication 100 MG: at 12:36

## 2024-09-02 RX ADMIN — POLYETHYLENE GLYCOL-3350 AND ELECTROLYTES 2000 ML: 236; 6.74; 5.86; 2.97; 22.74 POWDER, FOR SOLUTION ORAL at 14:47

## 2024-09-02 RX ADMIN — MIRTAZAPINE 7.5 MG: 15 TABLET, ORALLY DISINTEGRATING ORAL at 20:29

## 2024-09-02 RX ADMIN — HYDRALAZINE HYDROCHLORIDE 25 MG: 25 TABLET ORAL at 20:28

## 2024-09-02 RX ADMIN — HYDRALAZINE HYDROCHLORIDE 10 MG: 20 INJECTION INTRAMUSCULAR; INTRAVENOUS at 02:50

## 2024-09-02 RX ADMIN — METOCLOPRAMIDE 10 MG: 5 INJECTION, SOLUTION INTRAMUSCULAR; INTRAVENOUS at 11:39

## 2024-09-02 RX ADMIN — TRAZODONE HYDROCHLORIDE 50 MG: 50 TABLET ORAL at 20:28

## 2024-09-02 RX ADMIN — SODIUM CHLORIDE, PRESERVATIVE FREE 10 ML: 5 INJECTION INTRAVENOUS at 12:18

## 2024-09-02 RX ADMIN — DONEPEZIL HYDROCHLORIDE 5 MG: 5 TABLET, FILM COATED ORAL at 08:18

## 2024-09-02 RX ADMIN — PROPOFOL 20 MG: 10 INJECTION, EMULSION INTRAVENOUS at 12:48

## 2024-09-02 RX ADMIN — ONDANSETRON 4 MG: 2 INJECTION INTRAMUSCULAR; INTRAVENOUS at 13:32

## 2024-09-02 RX ADMIN — SODIUM CHLORIDE, POTASSIUM CHLORIDE, SODIUM LACTATE AND CALCIUM CHLORIDE: 600; 310; 30; 20 INJECTION, SOLUTION INTRAVENOUS at 12:30

## 2024-09-02 RX ADMIN — ATORVASTATIN CALCIUM 40 MG: 40 TABLET, FILM COATED ORAL at 08:18

## 2024-09-02 RX ADMIN — SODIUM CHLORIDE, POTASSIUM CHLORIDE, SODIUM LACTATE AND CALCIUM CHLORIDE: 600; 310; 30; 20 INJECTION, SOLUTION INTRAVENOUS at 02:48

## 2024-09-02 RX ADMIN — METOPROLOL TARTRATE 12.5 MG: 25 TABLET, FILM COATED ORAL at 20:28

## 2024-09-02 RX ADMIN — HYDRALAZINE HYDROCHLORIDE 25 MG: 25 TABLET ORAL at 08:17

## 2024-09-02 RX ADMIN — ASPIRIN 81 MG: 81 TABLET, COATED ORAL at 08:17

## 2024-09-02 RX ADMIN — BUPROPION HYDROCHLORIDE 150 MG: 150 TABLET, FILM COATED, EXTENDED RELEASE ORAL at 08:17

## 2024-09-02 RX ADMIN — INSULIN GLARGINE 10 UNITS: 100 INJECTION, SOLUTION SUBCUTANEOUS at 20:30

## 2024-09-02 ASSESSMENT — PAIN SCALES - GENERAL
PAINLEVEL_OUTOF10: 0

## 2024-09-02 NOTE — PROGRESS NOTES
RRT Clinical Rounding Nurse Progress Report      SUBJECTIVE: Patient assessed secondary to elevated Deterioration Index Score.      Vitals:    09/02/24 0250 09/02/24 0251 09/02/24 0300 09/02/24 0315   BP: (!) 193/98 (!) 193/98 (!) 188/93 (!) 160/93   Pulse:  87 87 89   Resp:  20 12 12   Temp:  98.5 °F (36.9 °C)     TempSrc:  Oral     SpO2:  100% 100% 100%   Weight:       Height:            DETERIORATION INDEX SCORE: 58    ASSESSMENT:      PLAN:  DI score on arrival to unit elevated, primary RN Christine at bedside at this time.    Yodit Alvarez RN

## 2024-09-02 NOTE — ED NOTES
ED TO INPATIENT SBAR HANDOFF    Patient Name: Kvng Arellano   Preferred Name: Kvng  : 1960  64 y.o.   Family/Caregiver Present: no   Code Status Order: Prior- Provider to update  PO Status: NPO: Effective now  Telemetry Order: No  C-SSRS: Risk of Suicide: No Risk  Sitter none  Restraints:  none  Sepsis Risk Score      Situation  Chief Complaint   Patient presents with    GI Bleeding     Brief Description of Patient's Condition: [facility] due to dark- colored vomitus and dark stools. Patient was placed on a nonrebreather mask via EMS on transit. Patient is currently on NC 2 L. Alert and oriented to self at baseline. Sepsis bundle completed.     Mental Status: oriented and alert; able to follow commands.  Arrived from: [facility]  Imaging:   CT CHEST WO CONTRAST   Final Result   Fluctuating infiltrates as described. Improvement in the left lung but   progression in the right lung      Interval development of mild superior endplate T3 compression fracture as well   as a pathologic sternal fracture with an underlying lytic lesion. Greater   concern for malignancy. Not typical for osteomyelitis.      Esophagitis versus distal esophageal mass. Recommend upper GI electively.      Electronically signed by Ashley Malave      XR CHEST PORTABLE   Final Result   1. No acute disease         Electronically signed by Marci Acevedo        Abnormal labs:   Abnormal Labs Reviewed   CBC WITH AUTO DIFFERENTIAL - Abnormal; Notable for the following components:       Result Value    RBC 2.81 (*)     Hemoglobin 8.4 (*)     Hematocrit 26.4 (*)     RDW 15.6 (*)     Neutrophils % 89 (*)     Lymphocytes % 6 (*)     Monocytes % 3 (*)     Immature Granulocytes % 1 (*)     Neutrophils Absolute 9.6 (*)     Lymphocytes Absolute 0.7 (*)     Immature Granulocytes Absolute 0.1 (*)     All other components within normal limits   COMPREHENSIVE METABOLIC PANEL - Abnormal; Notable  for the following components:    Sodium 146 (*)     Chloride 111 (*)     Glucose 232 (*)     BUN 80 (*)     Creatinine 4.49 (*)     Est, Glom Filt Rate 14 (*)     AST 13 (*)     ALT 11 (*)     Albumin 2.9 (*)     Globulin 4.6 (*)     Albumin/Globulin Ratio 0.6 (*)     All other components within normal limits   POC LACTIC ACID - Abnormal; Notable for the following components:    POC Lactic Acid 2.72 (*)     All other components within normal limits   URINALYSIS WITH MICROSCOPIC - Abnormal; Notable for the following components:    Protein,  (*)     Blood, Urine Small (*)     All other components within normal limits   POCT GLUCOSE - Abnormal; Notable for the following components:    POC Glucose 199 (*)     All other components within normal limits       Background  Allergies:   Allergies   Allergen Reactions    Ace Inhibitors Swelling    Bee Venom      Other reaction(s): other/intolerance    Iodine Other (See Comments)    Sulfa Antibiotics Itching     History:   Past Medical History:   Diagnosis Date    CAD (coronary artery disease)     CVS disease     Dementia (HCC)     Diabetes (HCC)     High cholesterol     HTN (hypertension)     Hypothyroidism 09/02/2024    Type 2 diabetes mellitus (HCC) 09/02/2024       Assessment  Vitals: MEWS Score: 5  Level of Consciousness: Alert (0)   Vitals:    09/02/24 0250 09/02/24 0251 09/02/24 0300 09/02/24 0315   BP: (!) 193/98 (!) 193/98 (!) 188/93 (!) 160/93   Pulse:  87 87 89   Resp:  20 12 12   Temp:  98.5 °F (36.9 °C)     TempSrc:  Oral     SpO2:  100% 100% 100%   Weight:       Height:         Deterioration Index (DI): Deterioration Index: (!) 57.92  Deterioration Index (DI) Interventions Performed:    O2 Flow Rate: O2 Flow Rate (L/min): 2 L/min  O2 Device: O2 Device: Nasal cannula  Cardiac Rhythm:    Critical Lab Results: [unfilled]  Cultures: Cultures:Blood, Flu, and Covid  NIH Score: NIH     Active LDA's:   Peripheral IV 09/01/24 Left Hand (Active)   Site Assessment Clean,

## 2024-09-02 NOTE — PROGRESS NOTES
Rapid Influenza B By PCR Not Detected Not Detected     POC Lactic Acid    Collection Time: 09/01/24  8:57 PM   Result Value Ref Range    POC Lactic Acid 0.94 0.40 - 2.00 mmol/L    Performed by: Yonathan Meneses    Urinalysis with Microscopic    Collection Time: 09/01/24  9:23 PM   Result Value Ref Range    Color, UA Yellow/Straw      Appearance Clear Clear      Specific Gravity, UA 1.013 1.003 - 1.030      pH, Urine 5.0 5.0 - 8.0      Protein,  (A) Negative mg/dL    Glucose, Ur Negative Negative mg/dL    Ketones, Urine Negative Negative mg/dL    Bilirubin, Urine Negative Negative      Blood, Urine Small (A) Negative      Urobilinogen, Urine 0.1 0.1 - 1.0 EU/dL    Nitrite, Urine Negative Negative      Leukocyte Esterase, Urine Negative Negative      WBC, UA 0-4 0 - 4 /hpf    RBC, UA 5-10 0 - 5 /hpf    Epithelial Cells, UA Few Few /lpf    BACTERIA, URINE Negative Negative /hpf   POCT Glucose    Collection Time: 09/02/24  2:18 AM   Result Value Ref Range    POC Glucose 199 (H) 65 - 100 mg/dL    Performed by: Yonathan Meneses    POCT Glucose    Collection Time: 09/02/24  7:49 AM   Result Value Ref Range    POC Glucose 142 (H) 65 - 100 mg/dL    Performed by: Mari De Oliveira    Lactic Acid    Collection Time: 09/02/24  7:54 AM   Result Value Ref Range    Lactic Acid, Plasma 2.3 (HH) 0.4 - 2.0 mmol/L   Basic Metabolic Panel w/ Reflex to MG    Collection Time: 09/02/24  7:54 AM   Result Value Ref Range    Sodium 146 (H) 136 - 145 mmol/L    Potassium 5.0 3.5 - 5.1 mmol/L    Chloride 115 (H) 97 - 108 mmol/L    CO2 23 21 - 32 mmol/L    Anion Gap 8 5 - 15 mmol/L    Glucose 137 (H) 65 - 100 mg/dL    BUN 78 (H) 6 - 20 mg/dL    Creatinine 4.23 (H) 0.70 - 1.30 mg/dL    BUN/Creatinine Ratio 18 12 - 20      Est, Glom Filt Rate 15 (L) >60 ml/min/1.73m2    Calcium 9.2 8.5 - 10.1 mg/dL   CBC with Auto Differential    Collection Time: 09/02/24  7:54 AM   Result Value Ref Range    WBC 19.6 (H) 4.1 - 11.1 K/uL    RBC 2.66 (L) 4.10 -  5.70 M/uL    Hemoglobin 8.1 (L) 12.1 - 17.0 g/dL    Hematocrit 24.7 (L) 36.6 - 50.3 %    MCV 92.9 80.0 - 99.0 FL    MCH 30.5 26.0 - 34.0 PG    MCHC 32.8 30.0 - 36.5 g/dL    RDW 15.8 (H) 11.5 - 14.5 %    Platelets 363 150 - 400 K/uL    MPV 9.1 8.9 - 12.9 FL    Nucleated RBCs 0.0 0.0  WBC    nRBC 0.00 0.00 - 0.01 K/uL    Neutrophils % 90 (H) 32 - 75 %    Lymphocytes % 3 (L) 12 - 49 %    Monocytes % 4 (L) 5 - 13 %    Eosinophils % 2 0 - 7 %    Basophils % 0 0 - 1 %    Immature Granulocytes % 1 (H) 0 - 0.5 %    Neutrophils Absolute 17.8 (H) 1.8 - 8.0 K/UL    Lymphocytes Absolute 0.5 (L) 0.8 - 3.5 K/UL    Monocytes Absolute 0.8 0.0 - 1.0 K/UL    Eosinophils Absolute 0.4 0.0 - 0.4 K/UL    Basophils Absolute 0.0 0.0 - 0.1 K/UL    Immature Granulocytes Absolute 0.1 (H) 0.00 - 0.04 K/UL    Differential Type AUTOMATED         CT CHEST WO CONTRAST   Final Result   Fluctuating infiltrates as described. Improvement in the left lung but   progression in the right lung      Interval development of mild superior endplate T3 compression fracture as well   as a pathologic sternal fracture with an underlying lytic lesion. Greater   concern for malignancy. Not typical for osteomyelitis.      Esophagitis versus distal esophageal mass. Recommend upper GI electively.      Electronically signed by Ashley Malave      XR CHEST PORTABLE   Final Result   1. No acute disease         Electronically signed by Marci Acevedo             Discussion/MDM:     [] High (any 2)    A. Problems (any 1)  [] Acute/Chronic Illness/injury posing threat to life or bodily function:    [] Severe exacerbation of chronic illness:    ---------------------------------------------------------------------  B. Risk of Treatment (any 1)   [] Drugs/treatments that require intensive monitoring for toxicity include:    [] IV ABX requiring serial renal monitoring for nephrotoxicity:     [] IV Narcotic analgesia for adverse drug reaction  [] Aggressive IV diuresis

## 2024-09-02 NOTE — ANESTHESIA PRE PROCEDURE
Never                                Counseling given: Not Answered      Vital Signs (Current):   Vitals:    09/02/24 0426 09/02/24 0748 09/02/24 0819 09/02/24 1115   BP: (!) 161/96 (!) 163/87 (!) 161/93 (!) 191/95   Pulse: 99 89     Resp: 16      Temp: 98.4 °F (36.9 °C) 97.5 °F (36.4 °C)  98.6 °F (37 °C)   TempSrc: Oral Oral  Rectal   SpO2: 98% 96%     Weight:       Height:                                                  BP Readings from Last 3 Encounters:   09/02/24 (!) 191/95   06/23/24 (!) 175/85   06/18/24 (!) 188/86       NPO Status:                                                                                 BMI:   Wt Readings from Last 3 Encounters:   09/01/24 59 kg (130 lb)   06/23/24 59 kg (130 lb)   06/17/24 59 kg (130 lb)     Body mass index is 19.77 kg/m².    CBC:   Lab Results   Component Value Date/Time    WBC 19.6 09/02/2024 07:54 AM    RBC 2.66 09/02/2024 07:54 AM    HGB 8.1 09/02/2024 07:54 AM    HCT 24.7 09/02/2024 07:54 AM    MCV 92.9 09/02/2024 07:54 AM    RDW 15.8 09/02/2024 07:54 AM     09/02/2024 07:54 AM       CMP:   Lab Results   Component Value Date/Time     09/02/2024 07:54 AM    K 5.0 09/02/2024 07:54 AM     09/02/2024 07:54 AM    CO2 23 09/02/2024 07:54 AM    BUN 78 09/02/2024 07:54 AM    CREATININE 4.23 09/02/2024 07:54 AM    LABGLOM 15 09/02/2024 07:54 AM    LABGLOM 26 10/03/2023 02:06 PM    LABGLOM 43 11/25/2022 05:00 AM    GLUCOSE 137 09/02/2024 07:54 AM    CALCIUM 9.2 09/02/2024 07:54 AM    BILITOT 0.2 09/01/2024 06:37 PM    ALKPHOS 114 09/01/2024 06:37 PM    AST 13 09/01/2024 06:37 PM    ALT 11 09/01/2024 06:37 PM       POC Tests:   Recent Labs     09/02/24  1133   POCGLU 149*       Coags:   Lab Results   Component Value Date/Time    PROTIME 13.7 09/01/2024 06:37 PM    INR 1.0 09/01/2024 06:37 PM       HCG (If Applicable): No results found for: \"PREGTESTUR\", \"PREGSERUM\", \"HCG\", \"HCGQUANT\"     ABGs: No results found for: \"PHART\", \"PO2ART\", \"GSQ5CER\",

## 2024-09-02 NOTE — ED NOTES
Patient B/p has been liable; trending hypertensive. MAP > 65 and stable at this time.    Dr Canales notified; no orders at this time.

## 2024-09-02 NOTE — ED NOTES
Patient trending hypertensive; alert and stable at this time. Patient denies headache or any other complaints.     Dr. Harkins notified; see orders.

## 2024-09-02 NOTE — ED NOTES
Oral temperature WNL (98.4); bare hugger removed at this time. Patient covered with warm blankets.     Dr. Gaviria notified.

## 2024-09-02 NOTE — PROGRESS NOTES
Hospitalist Progress Note               Daily Progress Note: 9/2/2024      Hospital Day: 2     Chief complaint:   Chief Complaint   Patient presents with    GI Bleeding        Subjective:   Hospital course to date    Kvng Arellano is a 64-year-old male resident of [facility] who has a history of mental illness, hypothyroidism, hypertension and coronary disease who comes into the ED with a complaint of hypothermia and having dark stools and bloody emesis.     Upon arriving to the emergency room her fecal occult blood test was checked and was negative.  Patient was also hypoxemic.  In the ED patient was treated empirically with antibiotics, octreotide and PPIs.  His body temperature was 93.2.  Odette hugger was put on his temperature is 98.4 now.    Patient is DNR    GI was consulted      --------  Patient is seen today for follow-up.  Patient keeps repeating that he has a hernia upon examination of the stomach I personally did not feel any hernias.  His BUN is 78 and creatinine 4.23.  His lactic acid is 2.3 his WBC count is 19.6    On CT there is a fluctuant infiltrates, improvement in the left lung but progression in the right lung.  There is also interval development of mild superior endplate T3 compression fracture as well as pathologic sternal fracture with underlying lytic lesion.  Greater concern for malignancy clearly not typical for osteomyelitis.  Esophagitis versus distal esophageal mass        Medications reviewed  Current Facility-Administered Medications   Medication Dose Route Frequency    amLODIPine (NORVASC) tablet 10 mg  10 mg Oral Daily    aspirin EC tablet 81 mg  81 mg Oral Daily    atorvastatin (LIPITOR) tablet 40 mg  40 mg Oral Daily    buPROPion (WELLBUTRIN XL) extended release tablet 150 mg  150 mg Oral QAM    clopidogrel (PLAVIX) tablet 75 mg  75 mg Oral Daily    donepezil (ARICEPT) tablet 5 mg  5 mg Oral QAM    hydrALAZINE (APRESOLINE) tablet 25 mg  25 mg Oral

## 2024-09-02 NOTE — ANESTHESIA POSTPROCEDURE EVALUATION
Department of Anesthesiology  Postprocedure Note    Patient: Kvng Arellano  MRN: [id number]  YOB: 1960  Date of evaluation: 9/2/2024    Procedure Summary       Date: 09/02/24 Room / Location: [facility] ENDO 04 / [facility]    Anesthesia Start: 1230 Anesthesia Stop: 1304    Procedure: ESOPHAGOGASTRODUODENOSCOPY (Upper GI Region) Diagnosis:       Gastrointestinal hemorrhage, unspecified gastrointestinal hemorrhage type      (Gastrointestinal hemorrhage, unspecified gastrointestinal hemorrhage type [K92.2])    Surgeons: Jay Ibarra MD Responsible Provider: Ash Drake MD    Anesthesia Type: General ASA Status: 4 - Emergent            Anesthesia Type: General    Branden Phase I: Branden Score: 8    Branden Phase II:      Anesthesia Post Evaluation    Patient location during evaluation: PACU  Patient participation: complete - patient cannot participate  Level of consciousness: sleepy but conscious  Pain score: 0  Airway patency: patent  Nausea & Vomiting: no nausea and no vomiting  Cardiovascular status: hemodynamically stable  Respiratory status: acceptable  Hydration status: stable  Multimodal analgesia pain management approach    No notable events documented.

## 2024-09-02 NOTE — PLAN OF CARE
Problem: Chronic Conditions and Co-morbidities  Goal: Patient's chronic conditions and co-morbidity symptoms are monitored and maintained or improved  Outcome: Progressing     Problem: Discharge Planning  Goal: Discharge to home or other facility with appropriate resources  Outcome: Progressing     Problem: Pain  Goal: Verbalizes/displays adequate comfort level or baseline comfort level  Outcome: Progressing     Problem: Skin/Tissue Integrity  Goal: Absence of new skin breakdown  Description: 1.  Monitor for areas of redness and/or skin breakdown  2.  Assess vascular access sites hourly  3.  Every 4-6 hours minimum:  Change oxygen saturation probe site  4.  Every 4-6 hours:  If on nasal continuous positive airway pressure, respiratory therapy assess nares and determine need for appliance change or resting period.  Outcome: Progressing     Problem: Confusion  Goal: Confusion, delirium, dementia, or psychosis is improved or at baseline  Description: INTERVENTIONS:  1. Assess for possible contributors to thought disturbance, including medications, impaired vision or hearing, underlying metabolic abnormalities, dehydration, psychiatric diagnoses, and notify attending LIP  2. [place] high risk fall precautions, as indicated  3. Provide frequent short contacts to provide reality reorientation, refocusing and direction  4. Decrease environmental stimuli, including noise as appropriate  5. Monitor and intervene to maintain adequate nutrition, hydration, elimination, sleep and activity  6. If unable to ensure safety without constant attention obtain sitter and review sitter guidelines with assigned personnel  7. Initiate Psychosocial CNS and Spiritual Care consult, as indicated  Outcome: Progressing     Problem: Safety - Adult  Goal: Free from fall injury  Outcome: Progressing

## 2024-09-02 NOTE — CONSULTS
Gastroenterology Consult Note        Patient: Kvng Arellano MRN: [id number]  SSN: [id number]    YOB: 1960  Age: 64 y.o.  Sex: male      Subjective:      Kvng rAellano is a 64 y.o. male who is being seen for gi bleeding  .64 y.o. male resident of [facility] with pmh of bipolar disorder, dementia, dysphagia,  who presents to the ER for having dark stools and bloody emesis. History from chart mostly.   In Er he was hypoxemia. CT showed T3 compression fracture as well as a pathologic sternal fracture with an underlying lytic lesion. Greater concern for malignancy. Not typical for osteomyelitis. Esophagitis versus distal esophageal mass. Recommend upper GI electively. Patient was treated empirically with antibiotics, octreotide, and PPI. Now he is off O 2 and no short of breath  Past Medical History:   Diagnosis Date    Anemia     Benign prostatic hyperplasia with lower urinary tract symptoms     Bipolar 1 disorder (HCC)     CAD (coronary artery disease)     Chronic kidney disease (CKD), stage III (moderate) (HCC)     CVS disease     Dementia (HCC)     Diabetes (HCC)     Dysphagia     Enterocolitis due to Clostridium difficile     High cholesterol     HTN (hypertension)     Hypothyroidism 09/02/2024    Myocardial infarction acute (HCC)     Pneumonia     Respiratory failure, unspecified with hypoxia (HCC)     TIA (transient ischemic attack)     Type 2 diabetes mellitus (HCC) 09/02/2024     Past Surgical History:   Procedure Laterality Date    HERNIA REPAIR        History reviewed. No pertinent family history.  Social History     Tobacco Use    Smoking status: Never    Smokeless tobacco: Never   Substance Use Topics    Alcohol use: Never      Current Facility-Administered Medications   Medication Dose Route Frequency Provider Last Rate Last Admin    amLODIPine (NORVASC) tablet 10 mg  10 mg Oral Daily Adrian Harkins MD        aspirin EC tablet 81 mg  81 mg

## 2024-09-02 NOTE — ED NOTES
Nasal trumpet and NC noted on patient's linen when this writer entered the room; NC was re-placed. Patient stable at this time.

## 2024-09-02 NOTE — PROGRESS NOTES
4 Eyes Skin Assessment     NAME:  Kvng Arellano  YOB: 1960  MEDICAL RECORD NUMBER:  [id number]    The patient is being assessed for  Admission    I agree that at least one RN has performed a thorough Head to Toe Skin Assessment on the patient. ALL assessment sites listed below have been assessed.      Areas assessed by both nurses:    Head, Face, Ears, Shoulders, Back, Chest, Arms, Elbows, Hands, Sacrum. Buttock, Coccyx, Ischium, Legs. Feet and Heels, and Under Medical Devices         Does the Patient have a Wound? No noted wound(s)       Amarjit Prevention initiated by RN: Yes  Wound Care Orders initiated by RN: No    Pressure Injury (Stage 3,4, Unstageable, DTI, NWPT, and Complex wounds) if present, place Wound referral order by RN under : No    New Ostomies, if present place, Ostomy referral order under : No     Nurse 1 eSignature: Electronically signed by Christine Garcia RN on 9/2/24 at 4:35 AM EDT    **SHARE this note so that the co-signing nurse can place an eSignature**    Nurse 2 eSignature: {Esignature:[id number]}

## 2024-09-02 NOTE — H&P
V2.0  History and Physical      Name:  Kvng Arellano /Age/Sex: 1960  (64 y.o. male)   MRN & CSN:  [id number] & [id number] Encounter Date/Time: 2024 12:48 AM EDT   Location:  ER PCP: Karen Samano APRN - NP       Hospital Day: 2    Assessment and Plan:   Kvng Arellano is a 64 y.o. male resident of [facility] with pmh of bipolar disorder, dementia, dysphagia, C. difficile, diabetes mellitus, CKD, hypothyroidism, hypertension, coronary disease who presents to the ER from the nursing home with staff stating that he has been having dark stools and bloody emesis.    Hospital Problems             Last Modified POA    * (Principal) Hypothermia 2024 Yes    Dehydration 2024 Yes    Hypothyroidism (Chronic) 2024 Yes    Iron deficiency anemia 2024 Yes    Primary hypertension 2024 Yes    Type 2 diabetes mellitus (HCC) (Chronic) 2024 Yes         Principal Problem:    Hypothermia  No evidence for sepsis.  Consider adrenal insufficiency and undertreated hypothyroidism.  His last T4 was at the very lowest of normal at 0.8 suggesting that he may need further replacement depending on the values we are going to obtain.  Plan:   A.m. cortisol level  TSH with reflex free T4    Active Problems:    Dehydration  As evidenced by increased BUN and acute kidney injury  Plan:   Gentle IV hydration with LR at 50 mL/h    Hypothyroidism  Plan:   Continue prior dose pending further study    Iron deficiency anemia  Plan:   Although his medication list shows supplemental iron but he is not absorbing it and iron deficient.  If he is low on iron he should receive IV iron prior to discharge.    Primary hypertension  Since he is warmed up he is actually now hypertensive  Plan:   Restart usual blood pressure medications    Type 2 diabetes mellitus (HCC)  Plan:   Patient on 10 units of Lantus daily.  Continue Lantus  Sliding scale insulin and fingerstick glucose checks       Admit to  MD Mayelin   sodium chloride (SODIUM CHLORIDE) 0.9 % solution Infuse 50 mLs intravenously every hour as needed (Dihydration) Started 5/29/24 to 5/31/24    Mayelin Stahl MD   metoprolol tartrate (LOPRESSOR) 25 MG tablet Take 0.5 tablets by mouth 2 times daily    Mayelin Stahl MD   donepezil (ARICEPT) 5 MG tablet Take 1 tablet by mouth every morning    Mayelin Stahl MD   ASPIRIN LOW DOSE 81 MG EC tablet TAKE ONE TABLET BY MOUTH DAILY. 1/23/24   Maryuri Cartagena PA-C   clopidogrel (PLAVIX) 75 MG tablet Take 1 tablet by mouth daily 10/3/23   Maryuri Cartagena PA-C   vitamin D (ERGOCALCIFEROL) 1.25 MG (21310 UT) CAPS capsule Take 1 capsule by mouth once a week 10/3/23   Maryuri Cartagena PA-C   insulin glargine (LANTUS SOLOSTAR) 100 UNIT/ML injection pen Inject 15 Units into the skin nightly  Patient taking differently: Inject 10 Units into the skin 10/3/23   Yifan Hirsch APRN - NP       Physical Exam:    Physical Exam     General: Confused, pulling at EKG leads and other equipment, pale, thin, appears chronically ill  Cardiovascular: Regular rate.  Respiratory: Clear to auscultation  Gastrointestinal: Soft, non tender  Musculoskeletal: No edema  Skin: warm, dry  Neuro: Oriented only to self, nonfocal        Past Medical History:   PMHx   Past Medical History:   Diagnosis Date    CAD (coronary artery disease)     CVS disease     Dementia (HCC)     Diabetes (HCC)     High cholesterol     HTN (hypertension)     Hypothyroidism 09/02/2024    Type 2 diabetes mellitus (HCC) 09/02/2024     PSHX:  has a past surgical history that includes hernia repair.  Allergies:   Allergies   Allergen Reactions    Ace Inhibitors Swelling    Bee Venom      Other reaction(s): other/intolerance    Iodine Other (See Comments)    Sulfa Antibiotics Itching     Fam HX:    family history is not on file.  Soc HX:   Social History     Socioeconomic History    Marital status:      Spouse name: None    Number of

## 2024-09-02 NOTE — PLAN OF CARE
Problem: Chronic Conditions and Co-morbidities  Goal: Patient's chronic conditions and co-morbidity symptoms are monitored and maintained or improved  9/2/2024 1905 by Cassandra Coleman RN  Outcome: Progressing  Flowsheets (Taken 9/2/2024 0920)  Care Plan - Patient's Chronic Conditions and Co-Morbidity Symptoms are Monitored and Maintained or Improved: Monitor and assess patient's chronic conditions and comorbid symptoms for stability, deterioration, or improvement  9/2/2024 0550 by Christine Garcia RN  Outcome: Progressing     Problem: Discharge Planning  Goal: Discharge to home or other facility with appropriate resources  9/2/2024 1905 by Cassandra Coleman RN  Outcome: Progressing  Flowsheets (Taken 9/2/2024 0920)  Discharge to home or other facility with appropriate resources: Identify barriers to discharge with patient and caregiver  9/2/2024 0550 by Christine Garcia RN  Outcome: Progressing     Problem: Pain  Goal: Verbalizes/displays adequate comfort level or baseline comfort level  9/2/2024 1905 by Cassandra Coleman RN  Outcome: Progressing  9/2/2024 0550 by Christine Garcia RN  Outcome: Progressing     Problem: Skin/Tissue Integrity  Goal: Absence of new skin breakdown  Description: 1.  Monitor for areas of redness and/or skin breakdown  2.  Assess vascular access sites hourly  3.  Every 4-6 hours minimum:  Change oxygen saturation probe site  4.  Every 4-6 hours:  If on nasal continuous positive airway pressure, respiratory therapy assess nares and determine need for appliance change or resting period.  9/2/2024 1905 by Cassandra Coleman RN  Outcome: Progressing  9/2/2024 0550 by Christine Garcia RN  Outcome: Progressing     Problem: Confusion  Goal: Confusion, delirium, dementia, or psychosis is improved or at baseline  Description: INTERVENTIONS:  1. Assess for possible contributors to thought disturbance, including medications, impaired vision or hearing, underlying metabolic

## 2024-09-03 ENCOUNTER — APPOINTMENT (OUTPATIENT)
Facility: HOSPITAL | Age: 64
DRG: 368 | End: 2024-09-03
Payer: MEDICARE

## 2024-09-03 ENCOUNTER — ANESTHESIA (OUTPATIENT)
Facility: HOSPITAL | Age: 64
DRG: 368 | End: 2024-09-03
Payer: MEDICARE

## 2024-09-03 ENCOUNTER — ANESTHESIA EVENT (OUTPATIENT)
Facility: HOSPITAL | Age: 64
DRG: 368 | End: 2024-09-03
Payer: MEDICARE

## 2024-09-03 LAB
ANION GAP SERPL CALC-SCNC: 6 MMOL/L (ref 5–15)
BASOPHILS # BLD: 0 K/UL (ref 0–0.1)
BASOPHILS NFR BLD: 0 % (ref 0–1)
BUN SERPL-MCNC: 64 MG/DL (ref 6–20)
BUN/CREAT SERPL: 17 (ref 12–20)
CA-I BLD-MCNC: 9.4 MG/DL (ref 8.5–10.1)
CHLORIDE SERPL-SCNC: 116 MMOL/L (ref 97–108)
CO2 SERPL-SCNC: 26 MMOL/L (ref 21–32)
CREAT SERPL-MCNC: 3.77 MG/DL (ref 0.7–1.3)
DIFFERENTIAL METHOD BLD: ABNORMAL
EOSINOPHIL # BLD: 0.7 K/UL (ref 0–0.4)
EOSINOPHIL NFR BLD: 5 % (ref 0–7)
ERYTHROCYTE [DISTWIDTH] IN BLOOD BY AUTOMATED COUNT: 15.9 % (ref 11.5–14.5)
GLUCOSE BLD STRIP.AUTO-MCNC: 119 MG/DL (ref 65–100)
GLUCOSE BLD STRIP.AUTO-MCNC: 128 MG/DL (ref 65–100)
GLUCOSE BLD STRIP.AUTO-MCNC: 141 MG/DL (ref 65–100)
GLUCOSE BLD STRIP.AUTO-MCNC: 209 MG/DL (ref 65–100)
GLUCOSE BLD STRIP.AUTO-MCNC: 53 MG/DL (ref 65–100)
GLUCOSE BLD STRIP.AUTO-MCNC: 71 MG/DL (ref 65–100)
GLUCOSE BLD STRIP.AUTO-MCNC: 74 MG/DL (ref 65–100)
GLUCOSE SERPL-MCNC: 50 MG/DL (ref 65–100)
HCT VFR BLD AUTO: 24.8 % (ref 36.6–50.3)
HGB BLD-MCNC: 7.9 G/DL (ref 12.1–17)
IMM GRANULOCYTES # BLD AUTO: 0.1 K/UL (ref 0–0.04)
IMM GRANULOCYTES NFR BLD AUTO: 0 % (ref 0–0.5)
LYMPHOCYTES # BLD: 0.7 K/UL (ref 0.8–3.5)
LYMPHOCYTES NFR BLD: 4 % (ref 12–49)
MCH RBC QN AUTO: 29.5 PG (ref 26–34)
MCHC RBC AUTO-ENTMCNC: 31.9 G/DL (ref 30–36.5)
MCV RBC AUTO: 92.5 FL (ref 80–99)
MONOCYTES # BLD: 0.7 K/UL (ref 0–1)
MONOCYTES NFR BLD: 4 % (ref 5–13)
NEUTS SEG # BLD: 13.4 K/UL (ref 1.8–8)
NEUTS SEG NFR BLD: 87 % (ref 32–75)
NRBC # BLD: 0 K/UL (ref 0–0.01)
NRBC BLD-RTO: 0 PER 100 WBC
PERFORMED BY:: ABNORMAL
PERFORMED BY:: NORMAL
PERFORMED BY:: NORMAL
PLATELET # BLD AUTO: 349 K/UL (ref 150–400)
PMV BLD AUTO: 9.7 FL (ref 8.9–12.9)
POTASSIUM SERPL-SCNC: 4 MMOL/L (ref 3.5–5.1)
RBC # BLD AUTO: 2.68 M/UL (ref 4.1–5.7)
SODIUM SERPL-SCNC: 148 MMOL/L (ref 136–145)
WBC # BLD AUTO: 15.6 K/UL (ref 4.1–11.1)

## 2024-09-03 PROCEDURE — 6370000000 HC RX 637 (ALT 250 FOR IP): Performed by: INTERNAL MEDICINE

## 2024-09-03 PROCEDURE — G0378 HOSPITAL OBSERVATION PER HR: HCPCS

## 2024-09-03 PROCEDURE — 6360000002 HC RX W HCPCS

## 2024-09-03 PROCEDURE — 96376 TX/PRO/DX INJ SAME DRUG ADON: CPT

## 2024-09-03 PROCEDURE — 82962 GLUCOSE BLOOD TEST: CPT

## 2024-09-03 PROCEDURE — 2709999900 HC NON-CHARGEABLE SUPPLY: Performed by: INTERNAL MEDICINE

## 2024-09-03 PROCEDURE — 70450 CT HEAD/BRAIN W/O DYE: CPT

## 2024-09-03 PROCEDURE — 3600007511: Performed by: INTERNAL MEDICINE

## 2024-09-03 PROCEDURE — 2580000003 HC RX 258

## 2024-09-03 PROCEDURE — 3600007501: Performed by: INTERNAL MEDICINE

## 2024-09-03 PROCEDURE — 85025 COMPLETE CBC W/AUTO DIFF WBC: CPT

## 2024-09-03 PROCEDURE — 80048 BASIC METABOLIC PNL TOTAL CA: CPT

## 2024-09-03 PROCEDURE — 36415 COLL VENOUS BLD VENIPUNCTURE: CPT

## 2024-09-03 RX ORDER — GLUCAGON 1 MG/ML
1 KIT INJECTION PRN
Status: DISCONTINUED | OUTPATIENT
Start: 2024-09-03 | End: 2024-09-05 | Stop reason: HOSPADM

## 2024-09-03 RX ORDER — HYDRALAZINE HYDROCHLORIDE 20 MG/ML
10 INJECTION INTRAMUSCULAR; INTRAVENOUS ONCE
Status: COMPLETED | OUTPATIENT
Start: 2024-09-03 | End: 2024-09-03

## 2024-09-03 RX ORDER — DEXTROSE MONOHYDRATE 100 MG/ML
INJECTION, SOLUTION INTRAVENOUS CONTINUOUS PRN
Status: DISCONTINUED | OUTPATIENT
Start: 2024-09-03 | End: 2024-09-05 | Stop reason: HOSPADM

## 2024-09-03 RX ADMIN — Medication 3 MG: at 20:59

## 2024-09-03 RX ADMIN — PANTOPRAZOLE SODIUM 40 MG: 40 TABLET, DELAYED RELEASE ORAL at 06:32

## 2024-09-03 RX ADMIN — HYDRALAZINE HYDROCHLORIDE 25 MG: 25 TABLET ORAL at 14:49

## 2024-09-03 RX ADMIN — SERTRALINE HYDROCHLORIDE 150 MG: 50 TABLET ORAL at 06:31

## 2024-09-03 RX ADMIN — TRAZODONE HYDROCHLORIDE 50 MG: 50 TABLET ORAL at 20:59

## 2024-09-03 RX ADMIN — HYDRALAZINE HYDROCHLORIDE 25 MG: 25 TABLET ORAL at 20:59

## 2024-09-03 RX ADMIN — INSULIN GLARGINE 10 UNITS: 100 INJECTION, SOLUTION SUBCUTANEOUS at 21:07

## 2024-09-03 RX ADMIN — DEXTROSE MONOHYDRATE 250 ML: 100 INJECTION, SOLUTION INTRAVENOUS at 08:16

## 2024-09-03 RX ADMIN — METOPROLOL TARTRATE 12.5 MG: 25 TABLET, FILM COATED ORAL at 11:45

## 2024-09-03 RX ADMIN — POLYETHYLENE GLYCOL-3350 AND ELECTROLYTES 2000 ML: 236; 6.74; 5.86; 2.97; 22.74 POWDER, FOR SOLUTION ORAL at 14:50

## 2024-09-03 RX ADMIN — HYDRALAZINE HYDROCHLORIDE 25 MG: 25 TABLET ORAL at 11:52

## 2024-09-03 RX ADMIN — AMLODIPINE BESYLATE 10 MG: 5 TABLET ORAL at 11:46

## 2024-09-03 RX ADMIN — METOPROLOL TARTRATE 12.5 MG: 25 TABLET, FILM COATED ORAL at 20:59

## 2024-09-03 RX ADMIN — TAMSULOSIN HYDROCHLORIDE 0.4 MG: 0.4 CAPSULE ORAL at 11:46

## 2024-09-03 RX ADMIN — LEVOTHYROXINE SODIUM 25 MCG: 0.03 TABLET ORAL at 06:32

## 2024-09-03 RX ADMIN — HYDRALAZINE HYDROCHLORIDE 10 MG: 20 INJECTION INTRAMUSCULAR; INTRAVENOUS at 11:19

## 2024-09-03 RX ADMIN — BUPROPION HYDROCHLORIDE 150 MG: 150 TABLET, FILM COATED, EXTENDED RELEASE ORAL at 11:46

## 2024-09-03 RX ADMIN — MIRTAZAPINE 7.5 MG: 15 TABLET, ORALLY DISINTEGRATING ORAL at 20:59

## 2024-09-03 RX ADMIN — ATORVASTATIN CALCIUM 40 MG: 40 TABLET, FILM COATED ORAL at 11:44

## 2024-09-03 RX ADMIN — DONEPEZIL HYDROCHLORIDE 5 MG: 5 TABLET, FILM COATED ORAL at 11:46

## 2024-09-03 ASSESSMENT — PAIN SCALES - GENERAL
PAINLEVEL_OUTOF10: 0
PAINLEVEL_OUTOF10: 0

## 2024-09-03 NOTE — CARE COORDINATION
DCP: return to LTC @ [facility]    Pt anticipated to remain inpt for 24 hours, pending GI clearance, colonoscopy today, diet advancement, monitoring blood sugars    1057: CM received message from [facility] asking if therapy had seen pt. CM stated therapy has not seen pt, but will reach out for orders. CM informed [facility] that pt has been admitted under observation status.

## 2024-09-03 NOTE — ANESTHESIA PRE PROCEDURE
09/03/2024 05:03 AM    LABGLOM 26 10/03/2023 02:06 PM    LABGLOM 43 11/25/2022 05:00 AM    GLUCOSE 50 09/03/2024 05:03 AM    CALCIUM 9.4 09/03/2024 05:03 AM    BILITOT 0.2 09/01/2024 06:37 PM    ALKPHOS 114 09/01/2024 06:37 PM    AST 13 09/01/2024 06:37 PM    ALT 11 09/01/2024 06:37 PM       POC Tests:   Recent Labs     09/02/24  2017   POCGLU 133*       Coags:   Lab Results   Component Value Date/Time    PROTIME 13.7 09/01/2024 06:37 PM    INR 1.0 09/01/2024 06:37 PM       HCG (If Applicable): No results found for: \"PREGTESTUR\", \"PREGSERUM\", \"HCG\", \"HCGQUANT\"     ABGs: No results found for: \"PHART\", \"PO2ART\", \"QPV4OKW\", \"HEX8SMB\", \"BEART\", \"L2XVGOSD\"     Type & Screen (If Applicable):  No results found for: \"LABABO\"    Drug/Infectious Status (If Applicable):  No results found for: \"HIV\", \"HEPCAB\"    COVID-19 Screening (If Applicable):   Lab Results   Component Value Date/Time    COVID19 Not Detected 09/01/2024 07:53 PM           Anesthesia Evaluation  Patient summary reviewed and Nursing notes reviewed  Airway: Mallampati: II  TM distance: >3 FB   Neck ROM: full  Mouth opening: > = 3 FB   Dental:    (+) poor dentition      Pulmonary:normal exam  breath sounds clear to auscultation  (+) pneumonia:                                      Cardiovascular:    (+) hypertension:, past MI:, CAD:, CHF:, hyperlipidemia        Rhythm: regular  Rate: normal                    Neuro/Psych:   (+) TIA, psychiatric history:dementia            GI/Hepatic/Renal:   (+) renal disease: CRI          Endo/Other:    (+) DiabetesType II DM, hypothyroidism, blood dyscrasia: anemia and anticoagulation therapy:..                  ROS comment: T3 compression fx with concern for malignancy Abdominal:             Vascular: negative vascular ROS.         Other Findings:             Anesthesia Plan      general     ASA 4     (Standard ASA monitors: continuous EKG, BP, HR, pulse oximeter, temperature, and capnography.)  Induction:

## 2024-09-03 NOTE — PROGRESS NOTES
Gastroenterology Progress Note        Patient: Kvng Arellano MRN: [id number]  SSN: [id number]    YOB: 1960  Age: 64 y.o.  Sex: male      Admit Date: 9/1/2024    LOS: 0 days     Subjective:   Patient examined, had bowel prep last night, no severe abdominal pain  Past Medical History:   Diagnosis Date    Anemia     Benign prostatic hyperplasia with lower urinary tract symptoms     Bipolar 1 disorder (HCC)     CAD (coronary artery disease)     Chronic kidney disease (CKD), stage III (moderate) (HCC)     CVS disease     Dementia (HCC)     Diabetes (HCC)     Dysphagia     Enterocolitis due to Clostridium difficile     High cholesterol     HTN (hypertension)     Hypothyroidism 09/02/2024    Myocardial infarction acute (HCC)     Pneumonia     Respiratory failure, unspecified with hypoxia (HCC)     TIA (transient ischemic attack)     Type 2 diabetes mellitus (HCC) 09/02/2024        Current Facility-Administered Medications:     glucose chewable tablet 16 g, 4 tablet, Oral, PRN, Laura Rojas MD    dextrose bolus 10% 125 mL, 125 mL, IntraVENous, PRN **OR** dextrose bolus 10% 250 mL, 250 mL, IntraVENous, PRN, Laura Rojas MD, Stopped at 09/03/24 1133    glucagon injection 1 mg, 1 mg, SubCUTAneous, PRN, Laura Rojas MD    dextrose 10 % infusion, , IntraVENous, Continuous PRN, Laura Rojas MD    amLODIPine (NORVASC) tablet 10 mg, 10 mg, Oral, Daily, Adrian Harkins MD, 10 mg at 09/03/24 1146    atorvastatin (LIPITOR) tablet 40 mg, 40 mg, Oral, Daily, Adrian Harkins MD, 40 mg at 09/03/24 1144    buPROPion (WELLBUTRIN XL) extended release tablet 150 mg, 150 mg, Oral, Shahana VILLEDA Robert K, MD, 150 mg at 09/03/24 1146    [Held by provider] clopidogrel (PLAVIX) tablet 75 mg, 75 mg, Oral, Daily, Adrian Harkins MD    donepezil (ARICEPT) tablet 5 mg, 5 mg, Oral, Shahana VILLEDA Robert K, MD, 5 mg at 09/03/24 1146    hydrALAZINE (APRESOLINE) tablet 25 mg, 25 mg, Oral, TID,

## 2024-09-03 NOTE — PROGRESS NOTES
Hospitalist Progress Note               Daily Progress Note: 9/3/2024      Hospital Day: 3     Chief complaint:   Chief Complaint   Patient presents with    GI Bleeding        Subjective:   Hospital course to date    Kvng Arellano is a 64-year-old male resident of [facility] who has a history of bipolar disorder, dementia, hypothyroidism, hypertension and coronary disease who comes into the ED with a complaint of hypothermia and having dark stools and bloody emesis.     Upon arriving to the emergency room her fecal occult blood test was checked and was negative.  Patient was also hypoxemic.  In the ED patient was treated empirically with antibiotics, octreotide and PPIs.  His body temperature was 93.2.  Odette hugger was put on his temperature is 98.4 now.    Patient is DNR    GI was consulted    On CT there is a fluctuant infiltrates, improvement in the left lung but progression in the right lung.  There is also interval development of mild superior endplate T3 compression fracture as well as pathologic sternal fracture with underlying lytic lesion.  Greater concern for malignancy clearly not typical for osteomyelitis.  Esophagitis versus distal esophageal mass    Upper endoscopy was done the day of admission and did not show any mass.    --------  Patient was examined today.  Patient was hypoglycemic in the morning and was given IV D10 treatment.  Blood glucose improved, however patient remained obtunded and not following commands or opening eyes.  CT head was ordered and results are pending.  Afternoon patient was more awake and started yelling.  His blood pressure was up to 200s and he was given 1 dose of IV hydralazine  He was taken to colonoscopy by GI today      Medications reviewed  Current Facility-Administered Medications   Medication Dose Route Frequency    glucose chewable tablet 16 g  4 tablet Oral PRN    dextrose bolus 10% 125 mL  125 mL IntraVENous PRN    Or    dextrose  bolus 10% 250 mL  250 mL IntraVENous PRN    glucagon injection 1 mg  1 mg SubCUTAneous PRN    dextrose 10 % infusion   IntraVENous Continuous PRN    amLODIPine (NORVASC) tablet 10 mg  10 mg Oral Daily    atorvastatin (LIPITOR) tablet 40 mg  40 mg Oral Daily    buPROPion (WELLBUTRIN XL) extended release tablet 150 mg  150 mg Oral QAM    [Held by provider] clopidogrel (PLAVIX) tablet 75 mg  75 mg Oral Daily    donepezil (ARICEPT) tablet 5 mg  5 mg Oral QAM    hydrALAZINE (APRESOLINE) tablet 25 mg  25 mg Oral TID    insulin glargine (LANTUS) injection vial 10 Units  10 Units SubCUTAneous Nightly    levothyroxine (SYNTHROID) tablet 25 mcg  25 mcg Oral Daily    melatonin tablet 3 mg  3 mg Oral Daily    metoprolol tartrate (LOPRESSOR) tablet 12.5 mg  12.5 mg Oral BID    mirtazapine (REMERON SOL-TAB) disintegrating tablet 7.5 mg  7.5 mg Oral Nightly    sertraline (ZOLOFT) tablet 150 mg  150 mg Oral Daily    tamsulosin (FLOMAX) capsule 0.4 mg  0.4 mg Oral Daily    traZODone (DESYREL) tablet 50 mg  50 mg Oral Nightly    sodium chloride flush 0.9 % injection 5-40 mL  5-40 mL IntraVENous 2 times per day    sodium chloride flush 0.9 % injection 5-40 mL  5-40 mL IntraVENous PRN    0.9 % sodium chloride infusion   IntraVENous PRN    ondansetron (ZOFRAN-ODT) disintegrating tablet 4 mg  4 mg Oral Q8H PRN    Or    ondansetron (ZOFRAN) injection 4 mg  4 mg IntraVENous Q6H PRN    polyethylene glycol (GLYCOLAX) packet 17 g  17 g Oral Daily PRN    acetaminophen (TYLENOL) tablet 650 mg  650 mg Oral Q6H PRN    Or    acetaminophen (TYLENOL) suppository 650 mg  650 mg Rectal Q6H PRN    insulin lispro (HUMALOG,ADMELOG) injection vial 0-4 Units  0-4 Units SubCUTAneous TID WC    insulin lispro (HUMALOG,ADMELOG) injection vial 0-4 Units  0-4 Units SubCUTAneous Nightly    lactated ringers IV soln infusion   IntraVENous Continuous    metoclopramide (REGLAN) injection 10 mg  10 mg IntraVENous Q6H PRN    pantoprazole (PROTONIX) tablet 40 mg  40 mg

## 2024-09-03 NOTE — PLAN OF CARE
Problem: Chronic Conditions and Co-morbidities  Goal: Patient's chronic conditions and co-morbidity symptoms are monitored and maintained or improved  Outcome: Progressing     Problem: Discharge Planning  Goal: Discharge to home or other facility with appropriate resources  Outcome: Progressing     Problem: Pain  Goal: Verbalizes/displays adequate comfort level or baseline comfort level  Outcome: Progressing     Problem: Skin/Tissue Integrity  Goal: Absence of new skin breakdown  Description: 1.  Monitor for areas of redness and/or skin breakdown  2.  Assess vascular access sites hourly  3.  Every 4-6 hours minimum:  Change oxygen saturation probe site  4.  Every 4-6 hours:  If on nasal continuous positive airway pressure, respiratory therapy assess nares and determine need for appliance change or resting period.  Outcome: Progressing     Problem: Confusion  Goal: Confusion, delirium, dementia, or psychosis is improved or at baseline  Description: INTERVENTIONS:  1. Assess for possible contributors to thought disturbance, including medications, impaired vision or hearing, underlying metabolic abnormalities, dehydration, psychiatric diagnoses, and notify attending LIP  2. [place] high risk fall precautions, as indicated  3. Provide frequent short contacts to provide reality reorientation, refocusing and direction  4. Decrease environmental stimuli, including noise as appropriate  5. Monitor and intervene to maintain adequate nutrition, hydration, elimination, sleep and activity  6. If unable to ensure safety without constant attention obtain sitter and review sitter guidelines with assigned personnel  7. Initiate Psychosocial CNS and Spiritual Care consult, as indicated  Outcome: Progressing     Problem: Safety - Adult  Goal: Free from fall injury  Outcome: Progressing     Problem: Safety - Medical Restraint  Goal: Remains free of injury from restraints (Restraint for Interference with Medical  Device)  Description: INTERVENTIONS:  1. Determine that other, less restrictive measures have been tried or would not be effective before applying the restraint  2. Evaluate the patient's condition at the time of restraint application  3. Inform patient/family regarding the reason for restraint  4. Q2H: Monitor safety, psychosocial status, comfort, nutrition and hydration  Outcome: Progressing  Flowsheets (Taken 9/3/2024 0033 by Hannah Moreland RN)  Remains free of injury from restraints (restraint for interference with medical device): Every 2 hours: Monitor safety, psychosocial status, comfort, nutrition and hydration

## 2024-09-03 NOTE — PROGRESS NOTES
Called by RN as patient is less responsive, he was hypoglycemic earlier and got d10% IV treatment, BG is currently 118. Patient is moaning, not following commands and does not open his eyes. Will order CT head w/o contrast. Off note he has h/o dementia

## 2024-09-04 ENCOUNTER — ANESTHESIA EVENT (OUTPATIENT)
Facility: HOSPITAL | Age: 64
DRG: 368 | End: 2024-09-04
Payer: MEDICARE

## 2024-09-04 ENCOUNTER — APPOINTMENT (OUTPATIENT)
Facility: HOSPITAL | Age: 64
DRG: 368 | End: 2024-09-04
Payer: MEDICARE

## 2024-09-04 ENCOUNTER — ANESTHESIA (OUTPATIENT)
Facility: HOSPITAL | Age: 64
DRG: 368 | End: 2024-09-04
Payer: MEDICARE

## 2024-09-04 PROBLEM — K92.0 HEMATEMESIS: Status: ACTIVE | Noted: 2024-09-04

## 2024-09-04 LAB
ANION GAP SERPL CALC-SCNC: 8 MMOL/L (ref 5–15)
BASOPHILS # BLD: 0 K/UL (ref 0–0.1)
BASOPHILS NFR BLD: 0 % (ref 0–1)
BUN SERPL-MCNC: 48 MG/DL (ref 6–20)
BUN/CREAT SERPL: 15 (ref 12–20)
CA-I BLD-MCNC: 8.8 MG/DL (ref 8.5–10.1)
CHLORIDE SERPL-SCNC: 112 MMOL/L (ref 97–108)
CO2 SERPL-SCNC: 26 MMOL/L (ref 21–32)
CREAT SERPL-MCNC: 3.22 MG/DL (ref 0.7–1.3)
DIFFERENTIAL METHOD BLD: ABNORMAL
EOSINOPHIL # BLD: 1.1 K/UL (ref 0–0.4)
EOSINOPHIL NFR BLD: 11 % (ref 0–7)
ERYTHROCYTE [DISTWIDTH] IN BLOOD BY AUTOMATED COUNT: 15.7 % (ref 11.5–14.5)
GLUCOSE BLD STRIP.AUTO-MCNC: 186 MG/DL (ref 65–100)
GLUCOSE BLD STRIP.AUTO-MCNC: 201 MG/DL (ref 65–100)
GLUCOSE BLD STRIP.AUTO-MCNC: 54 MG/DL (ref 65–100)
GLUCOSE BLD STRIP.AUTO-MCNC: 66 MG/DL (ref 65–100)
GLUCOSE BLD STRIP.AUTO-MCNC: 91 MG/DL (ref 65–100)
GLUCOSE SERPL-MCNC: 95 MG/DL (ref 65–100)
HCT VFR BLD AUTO: 22.2 % (ref 36.6–50.3)
HGB BLD-MCNC: 7.5 G/DL (ref 12.1–17)
IMM GRANULOCYTES # BLD AUTO: 0 K/UL (ref 0–0.04)
IMM GRANULOCYTES NFR BLD AUTO: 0 % (ref 0–0.5)
LACTATE SERPL-SCNC: 0.5 MMOL/L (ref 0.4–2)
LYMPHOCYTES # BLD: 0.7 K/UL (ref 0.8–3.5)
LYMPHOCYTES NFR BLD: 7 % (ref 12–49)
MCH RBC QN AUTO: 30.5 PG (ref 26–34)
MCHC RBC AUTO-ENTMCNC: 33.8 G/DL (ref 30–36.5)
MCV RBC AUTO: 90.2 FL (ref 80–99)
MONOCYTES # BLD: 0.5 K/UL (ref 0–1)
MONOCYTES NFR BLD: 5 % (ref 5–13)
NEUTS SEG # BLD: 7.3 K/UL (ref 1.8–8)
NEUTS SEG NFR BLD: 77 % (ref 32–75)
NRBC # BLD: 0 K/UL (ref 0–0.01)
NRBC BLD-RTO: 0 PER 100 WBC
PERFORMED BY:: ABNORMAL
PERFORMED BY:: NORMAL
PERFORMED BY:: NORMAL
PLATELET # BLD AUTO: 299 K/UL (ref 150–400)
PMV BLD AUTO: 9.1 FL (ref 8.9–12.9)
POTASSIUM SERPL-SCNC: 3.6 MMOL/L (ref 3.5–5.1)
RBC # BLD AUTO: 2.46 M/UL (ref 4.1–5.7)
RBC MORPH BLD: ABNORMAL
SODIUM SERPL-SCNC: 146 MMOL/L (ref 136–145)
WBC # BLD AUTO: 9.6 K/UL (ref 4.1–11.1)

## 2024-09-04 PROCEDURE — 2580000003 HC RX 258: Performed by: NURSE ANESTHETIST, CERTIFIED REGISTERED

## 2024-09-04 PROCEDURE — 7100000010 HC PHASE II RECOVERY - FIRST 15 MIN: Performed by: INTERNAL MEDICINE

## 2024-09-04 PROCEDURE — 80048 BASIC METABOLIC PNL TOTAL CA: CPT

## 2024-09-04 PROCEDURE — 6360000004 HC RX CONTRAST MEDICATION

## 2024-09-04 PROCEDURE — 2500000003 HC RX 250 WO HCPCS: Performed by: NURSE ANESTHETIST, CERTIFIED REGISTERED

## 2024-09-04 PROCEDURE — 3600007502: Performed by: INTERNAL MEDICINE

## 2024-09-04 PROCEDURE — 6360000002 HC RX W HCPCS: Performed by: NURSE ANESTHETIST, CERTIFIED REGISTERED

## 2024-09-04 PROCEDURE — G0378 HOSPITAL OBSERVATION PER HR: HCPCS

## 2024-09-04 PROCEDURE — 3700000001 HC ADD 15 MINUTES (ANESTHESIA): Performed by: INTERNAL MEDICINE

## 2024-09-04 PROCEDURE — 7100000011 HC PHASE II RECOVERY - ADDTL 15 MIN: Performed by: INTERNAL MEDICINE

## 2024-09-04 PROCEDURE — 6370000000 HC RX 637 (ALT 250 FOR IP): Performed by: INTERNAL MEDICINE

## 2024-09-04 PROCEDURE — 88305 TISSUE EXAM BY PATHOLOGIST: CPT

## 2024-09-04 PROCEDURE — 85025 COMPLETE CBC W/AUTO DIFF WBC: CPT

## 2024-09-04 PROCEDURE — 36415 COLL VENOUS BLD VENIPUNCTURE: CPT

## 2024-09-04 PROCEDURE — 82962 GLUCOSE BLOOD TEST: CPT

## 2024-09-04 PROCEDURE — 74176 CT ABD & PELVIS W/O CONTRAST: CPT

## 2024-09-04 PROCEDURE — 2709999900 HC NON-CHARGEABLE SUPPLY: Performed by: INTERNAL MEDICINE

## 2024-09-04 PROCEDURE — 2580000003 HC RX 258

## 2024-09-04 PROCEDURE — 2060000000 HC ICU INTERMEDIATE R&B

## 2024-09-04 PROCEDURE — 0DBN8ZZ EXCISION OF SIGMOID COLON, VIA NATURAL OR ARTIFICIAL OPENING ENDOSCOPIC: ICD-10-PCS | Performed by: INTERNAL MEDICINE

## 2024-09-04 PROCEDURE — 83605 ASSAY OF LACTIC ACID: CPT

## 2024-09-04 PROCEDURE — 3600007512: Performed by: INTERNAL MEDICINE

## 2024-09-04 PROCEDURE — 3700000000 HC ANESTHESIA ATTENDED CARE: Performed by: INTERNAL MEDICINE

## 2024-09-04 RX ORDER — SODIUM CHLORIDE 9 MG/ML
INJECTION, SOLUTION INTRAVENOUS CONTINUOUS PRN
Status: DISCONTINUED | OUTPATIENT
Start: 2024-09-04 | End: 2024-09-04 | Stop reason: SDUPTHER

## 2024-09-04 RX ORDER — LIDOCAINE HYDROCHLORIDE 20 MG/ML
INJECTION, SOLUTION EPIDURAL; INFILTRATION; INTRACAUDAL; PERINEURAL PRN
Status: DISCONTINUED | OUTPATIENT
Start: 2024-09-04 | End: 2024-09-04 | Stop reason: SDUPTHER

## 2024-09-04 RX ORDER — PROPOFOL 10 MG/ML
INJECTION, EMULSION INTRAVENOUS PRN
Status: DISCONTINUED | OUTPATIENT
Start: 2024-09-04 | End: 2024-09-04 | Stop reason: SDUPTHER

## 2024-09-04 RX ADMIN — ATORVASTATIN CALCIUM 40 MG: 40 TABLET, FILM COATED ORAL at 08:38

## 2024-09-04 RX ADMIN — LEVOTHYROXINE SODIUM 25 MCG: 0.03 TABLET ORAL at 05:21

## 2024-09-04 RX ADMIN — PANTOPRAZOLE SODIUM 40 MG: 40 TABLET, DELAYED RELEASE ORAL at 05:21

## 2024-09-04 RX ADMIN — Medication 3 MG: at 21:04

## 2024-09-04 RX ADMIN — DEXTROSE MONOHYDRATE 125 ML: 100 INJECTION, SOLUTION INTRAVENOUS at 11:43

## 2024-09-04 RX ADMIN — HYDRALAZINE HYDROCHLORIDE 25 MG: 25 TABLET ORAL at 08:38

## 2024-09-04 RX ADMIN — DONEPEZIL HYDROCHLORIDE 5 MG: 5 TABLET, FILM COATED ORAL at 08:38

## 2024-09-04 RX ADMIN — SERTRALINE HYDROCHLORIDE 150 MG: 50 TABLET ORAL at 05:21

## 2024-09-04 RX ADMIN — DIATRIZOATE MEGLUMINE AND DIATRIZOATE SODIUM 30 ML: 660; 100 LIQUID ORAL; RECTAL at 21:04

## 2024-09-04 RX ADMIN — TAMSULOSIN HYDROCHLORIDE 0.4 MG: 0.4 CAPSULE ORAL at 08:38

## 2024-09-04 RX ADMIN — SODIUM CHLORIDE: 9 INJECTION, SOLUTION INTRAVENOUS at 15:18

## 2024-09-04 RX ADMIN — AMLODIPINE BESYLATE 10 MG: 5 TABLET ORAL at 08:37

## 2024-09-04 RX ADMIN — METOPROLOL TARTRATE 12.5 MG: 25 TABLET, FILM COATED ORAL at 08:38

## 2024-09-04 RX ADMIN — PROPOFOL 30 MG: 10 INJECTION, EMULSION INTRAVENOUS at 15:23

## 2024-09-04 RX ADMIN — BUPROPION HYDROCHLORIDE 150 MG: 150 TABLET, FILM COATED, EXTENDED RELEASE ORAL at 08:38

## 2024-09-04 RX ADMIN — LIDOCAINE HYDROCHLORIDE 60 MG: 20 SOLUTION INTRAVENOUS at 15:22

## 2024-09-04 RX ADMIN — MIRTAZAPINE 7.5 MG: 15 TABLET, ORALLY DISINTEGRATING ORAL at 21:04

## 2024-09-04 RX ADMIN — PROPOFOL 30 MG: 10 INJECTION, EMULSION INTRAVENOUS at 15:29

## 2024-09-04 RX ADMIN — TRAZODONE HYDROCHLORIDE 50 MG: 50 TABLET ORAL at 21:04

## 2024-09-04 RX ADMIN — PROPOFOL 30 MG: 10 INJECTION, EMULSION INTRAVENOUS at 15:26

## 2024-09-04 RX ADMIN — PROPOFOL 30 MG: 10 INJECTION, EMULSION INTRAVENOUS at 15:22

## 2024-09-04 RX ADMIN — HYDRALAZINE HYDROCHLORIDE 25 MG: 25 TABLET ORAL at 14:17

## 2024-09-04 RX ADMIN — METOPROLOL TARTRATE 12.5 MG: 25 TABLET, FILM COATED ORAL at 21:04

## 2024-09-04 RX ADMIN — HYDRALAZINE HYDROCHLORIDE 25 MG: 25 TABLET ORAL at 21:04

## 2024-09-04 ASSESSMENT — PAIN SCALES - GENERAL
PAINLEVEL_OUTOF10: 0
PAINLEVEL_OUTOF10: 0

## 2024-09-04 NOTE — ANESTHESIA PRE PROCEDURE
0838    [Held by provider] clopidogrel (PLAVIX) tablet 75 mg  75 mg Oral Daily Adrian Harkins MD        donepezil (ARICEPT) tablet 5 mg  5 mg Oral QAM Adrian Harkins MD   5 mg at 09/04/24 0838    hydrALAZINE (APRESOLINE) tablet 25 mg  25 mg Oral TID Adrian Harkins MD   25 mg at 09/04/24 0838    insulin glargine (LANTUS) injection vial 10 Units  10 Units SubCUTAneous Nightly Adrian Harkins MD   10 Units at 09/03/24 2107    levothyroxine (SYNTHROID) tablet 25 mcg  25 mcg Oral Daily Adrian Harkins MD   25 mcg at 09/04/24 0521    melatonin tablet 3 mg  3 mg Oral Daily Adrian Harkins MD   3 mg at 09/03/24 2059    metoprolol tartrate (LOPRESSOR) tablet 12.5 mg  12.5 mg Oral BID Adrian Harkins MD   12.5 mg at 09/04/24 0838    mirtazapine (REMERON SOL-TAB) disintegrating tablet 7.5 mg  7.5 mg Oral Nightly Adrian Harkins MD   7.5 mg at 09/03/24 2059    sertraline (ZOLOFT) tablet 150 mg  150 mg Oral Daily Adrian Harkins MD   150 mg at 09/04/24 0521    tamsulosin (FLOMAX) capsule 0.4 mg  0.4 mg Oral Daily Adrian Harkins MD   0.4 mg at 09/04/24 0838    traZODone (DESYREL) tablet 50 mg  50 mg Oral Nightly Adrian Harkins MD   50 mg at 09/03/24 2059    sodium chloride flush 0.9 % injection 5-40 mL  5-40 mL IntraVENous 2 times per day Adrian Harkins MD   10 mL at 09/02/24 0849    sodium chloride flush 0.9 % injection 5-40 mL  5-40 mL IntraVENous PRN Adrian Harkins MD   10 mL at 09/02/24 1218    0.9 % sodium chloride infusion   IntraVENous PRN Adrian Harkins MD        ondansetron (ZOFRAN-ODT) disintegrating tablet 4 mg  4 mg Oral Q8H PRN Adrian Harkins MD        Or    ondansetron (ZOFRAN) injection 4 mg  4 mg IntraVENous Q6H PRN Adrian Harkins MD   4 mg at 09/02/24 1332    polyethylene glycol (GLYCOLAX) packet 17 g  17 g Oral Daily PRN Adrian Harkins MD        acetaminophen (TYLENOL) tablet 650 mg  650 mg Oral Q6H PRN Shahana,

## 2024-09-04 NOTE — PROGRESS NOTES
Spoke with MD santos about pt bp being consistently high. No new orders at this time. She verbalized being aware.

## 2024-09-04 NOTE — CARE COORDINATION
09/04/24 1123   Service Assessment   Patient Orientation Unable to Assess   Cognition Other (see comment)   History Provided By Medical Record   Primary Caregiver Other (Comment)  (LTC at [facility])   Accompanied By/Relationship alone   Support Systems Family Members   Patient's Healthcare Decision Maker is: Patient Declined (Legal Next of Kin Remains as Decision Maker)   PCP Verified by CM No   Prior Functional Level Assistance with the following:;Bathing;Dressing;Toileting;Feeding;Cooking;Housework;Shopping;Mobility   Current Functional Level Assistance with the following:;Bathing;Dressing;Toileting;Feeding;Cooking;Housework;Mobility;Shopping   Can patient return to prior living arrangement Yes   Ability to make needs known: Poor   Family able to assist with home care needs: No   Would you like for me to discuss the discharge plan with any other family members/significant others, and if so, who? Yes   Financial Resources Medicare;Medicaid   Community Resources None   CM/SW Referral Other (see comment)  (discharge planning)   Discharge Planning   Patient expects to be discharged to: Long-term care  ([facility])     Pt to return to LTC at [facility] when medically cleared for dc.

## 2024-09-04 NOTE — PROGRESS NOTES
4 Eyes Skin Assessment     NAME:  Kvng Arellano  YOB: 1960  MEDICAL RECORD NUMBER:  [id number]    The patient is being assessed for  Other 4 eyes    I agree that at least one RN has performed a thorough Head to Toe Skin Assessment on the patient. ALL assessment sites listed below have been assessed.      Areas assessed by both nurses:    Head, Face, Ears, Shoulders, Back, Chest, Arms, Elbows, Hands, Sacrum. Buttock, Coccyx, Ischium, Legs. Feet and Heels, and Under Medical Devices         Does the Patient have a Wound? No noted wound(s)       Amarjit Prevention initiated by RN: Yes  Wound Care Orders initiated by RN: No    Pressure Injury (Stage 3,4, Unstageable, DTI, NWPT, and Complex wounds) if present, place Wound referral order by RN under : No    New Ostomies, if present place, Ostomy referral order under : No     Nurse 1 eSignature: Electronically signed by Cassandra Coleman RN on 9/4/24 at 5:49 PM EDT    **SHARE this note so that the co-signing nurse can place an eSignature**    Nurse 2 eSignature: Electronically signed by Maxine Sahu RN on 9/4/24 at 5:50 PM EDT

## 2024-09-04 NOTE — PROGRESS NOTES
Gastroenterology Progress Note        Patient: Kvng Arellano MRN: [id number]  SSN: [id number]    YOB: 1960  Age: 64 y.o.  Sex: male      Admit Date: 9/1/2024    LOS: 0 days     Subjective:   Patient examined, had bowel prep last night, appeared to be ready for colonoscopy,  Past Medical History:   Diagnosis Date    Anemia     Benign prostatic hyperplasia with lower urinary tract symptoms     Bipolar 1 disorder (HCC)     CAD (coronary artery disease)     Chronic kidney disease (CKD), stage III (moderate) (HCC)     CVS disease     Dementia (HCC)     Diabetes (HCC)     Dysphagia     Enterocolitis due to Clostridium difficile     High cholesterol     HTN (hypertension)     Hypothyroidism 09/02/2024    Myocardial infarction acute (HCC)     Pneumonia     Respiratory failure, unspecified with hypoxia (HCC)     TIA (transient ischemic attack)     Type 2 diabetes mellitus (HCC) 09/02/2024        Current Facility-Administered Medications:     glucose chewable tablet 16 g, 4 tablet, Oral, PRN, Laura Rojas MD    dextrose bolus 10% 125 mL, 125 mL, IntraVENous, PRN, Stopped at 09/04/24 1203 **OR** dextrose bolus 10% 250 mL, 250 mL, IntraVENous, PRN, Laura Rojas MD, Stopped at 09/03/24 1133    glucagon injection 1 mg, 1 mg, SubCUTAneous, PRN, Laura Rojas MD    dextrose 10 % infusion, , IntraVENous, Continuous PRN, Laura Rojas MD    amLODIPine (NORVASC) tablet 10 mg, 10 mg, Oral, Daily, Adrian Harkins MD, 10 mg at 09/04/24 0837    atorvastatin (LIPITOR) tablet 40 mg, 40 mg, Oral, Daily, Adrian Harkins MD, 40 mg at 09/04/24 0838    buPROPion (WELLBUTRIN XL) extended release tablet 150 mg, 150 mg, Oral, Shahana VILLEDA Robert K, MD, 150 mg at 09/04/24 0838    [Held by provider] clopidogrel (PLAVIX) tablet 75 mg, 75 mg, Oral, Daily, Adrian Harkins MD    donepezil (ARICEPT) tablet 5 mg, 5 mg, Oral, Shahana VILLEDA Robert K, MD, 5 mg at 09/04/24 0838    hydrALAZINE (APRESOLINE)  Improvement in the left lung but   progression in the right lung      Interval development of mild superior endplate T3 compression fracture as well   as a pathologic sternal fracture with an underlying lytic lesion. Greater   concern for malignancy. Not typical for osteomyelitis.      Esophagitis versus distal esophageal mass. Recommend upper GI electively.      Electronically signed by Ashley Malave      XR CHEST PORTABLE   Final Result   1. No acute disease         Electronically signed by Marci Acevedo         [unfilled]  Assessment:     Principal Problem:    Hypothermia  Active Problems:    Primary hypertension    Hypothyroidism    Type 2 diabetes mellitus (HCC)    Dehydration    Iron deficiency anemia    Hematemesis  Resolved Problems:    Hypothermia, initial encounter  Bloody emesis and dark stools  -anemia, iron deficiency     -CT showing possible esophageal mass  EGD showed esophagitis with background Owusu's esophagus, biopsy taken, no evidence of portal hypertension.      Sepsis hypothermia, resolved,  Plan:   On antibiotic coverage  Hold ASA and plavix  PPI p.o.,  Hemoglobin drop down again after blood transfusion,  Had repeated bowel preparation,   Ready for colonoscopy today at medical emergency, will I will get consent from the polyps chronic, patient had metastatic change.      Signed By:              Thank you for allowing me to participate in this patients care    DISCLAIMER:  Please note that this report was generated to utilize Dragon Dictation system, the software is designed to speed over the accuracy.  Every effort has been done to attempt to correct this mistakes upon review, but there still might be some inadvertent errors in grammar and spelling.  Please utilize caution while reading the report due to this  inherent and potential for grammatical mistakes.    This physician  works as a inpatient consult gastroenterologist only, any result not available at time of inpatient discharge and/or

## 2024-09-04 NOTE — PROGRESS NOTES
Hospitalist Progress Note               Daily Progress Note: 9/4/2024      Hospital Day: 4     Chief complaint:   Chief Complaint   Patient presents with    GI Bleeding        Subjective:   Hospital course to date    Kvng Arellano is a 64-year-old male resident of [facility] who has a history of bipolar disorder, dementia, hypothyroidism, hypertension and coronary disease who comes into the ED with a complaint of hypothermia and having dark stools and bloody emesis.     Upon arriving to the emergency room her fecal occult blood test was checked and was negative.  Patient was also hypoxemic.  In the ED patient was treated empirically with antibiotics, octreotide and PPIs.  His body temperature was 93.2.  Odette hugger was put on his temperature is 98.4 now.    Patient is DNR    GI was consulted  Patient got 1 unit of PRBC    On CT there is a fluctuant infiltrates, improvement in the left lung but progression in the right lung.  There is also interval development of mild superior endplate T3 compression fracture as well as pathologic sternal fracture with underlying lytic lesion.  Greater concern for malignancy clearly not typical for osteomyelitis.  Esophagitis versus distal esophageal mass.    Upper endoscopy was done the day of admission and did not show any mass.    Patient had an episode of hypoglycemia on 9/3, which resolved with D10 administration.    Patient supposed to had colonoscopy on 9/3 but he had poor bowel prep, so it was rescheduled for today 9/4, results are pending.    --------  Patient was examined today.  Patient is more awake today and can answer simple questions.  He states he does not have any pain but he feels weak.      Medications reviewed  Current Facility-Administered Medications   Medication Dose Route Frequency    glucose chewable tablet 16 g  4 tablet Oral PRN    dextrose bolus 10% 125 mL  125 mL IntraVENous PRN    Or    dextrose bolus 10% 250 mL  250 mL  pathologic sternal fracture with underlying lytic lesion.  Consult nephrology  Continue his home medication  Continue SSI blood sugars are fluctuating with frequent episodes of hypoglycemia, will stop Lantus for now  Continue fluids for mild hyponatremia  Will monitor anemia and order 1 unit of PRBC if hemoglobin is persistently less than 8.  Hemoglobin has been trending down  Delirium precaution, avoid lab draws in the middle of the night, keep the room quite, sunlight during the day, avoid necessary lab draw  Will order CT abdomen and pelvis for better evaluation for metastatic disease.    Prognosis appears very poor    Patient has no family members and does not have the capacity to make decisions for himself.  For, there is no one to get consent for procedures.  However, patient likely has underlying malignancy and presented with vomiting blood.  This is considered life and limb, consent for EGD and colonoscopy and anesthesia is implied    Code status: DNR    Social determinants of health: none    Estimated discharge date//time frame/disposition: More than 48 hours    Barriers to discharge: Pending CT, colonoscopy      Laura Rojas MD

## 2024-09-04 NOTE — CARE COORDINATION
DCP: return to LTC @ [facility]    Pt anticipated to be cleared for dc in 24-48 hours. Colonoscopy rescheduled for today, will need GI clearance. Ortho may be consulted for compression fracture. Attending to upgrade pt to inpt per IDR update.     1125: CM observed pt now upgraded from obs to inpt status.

## 2024-09-04 NOTE — ANESTHESIA POSTPROCEDURE EVALUATION
Department of Anesthesiology  Postprocedure Note    Patient: Kvng Arellano  MRN: [id number]  YOB: 1960  Date of evaluation: 9/4/2024    Procedure Summary       Date: 09/04/24 Room / Location: [facility] 03 / [facility] ENDOSCOPY    Anesthesia Start: 1518 Anesthesia Stop: 1534    Procedure: COLONOSCOPY DIAGNOSTIC (Lower GI Region) Diagnosis:       Gastrointestinal hemorrhage, unspecified gastrointestinal hemorrhage type      (Gastrointestinal hemorrhage, unspecified gastrointestinal hemorrhage type [K92.2])    Surgeons: Jay Ibarra MD Responsible Provider: Ash Drake MD    Anesthesia Type: MAC ASA Status: 4 - Emergent            Anesthesia Type: MAC    Branden Phase I: Branden Score: 8    Branden Phase II:      Anesthesia Post Evaluation    Patient location during evaluation: bedside (Endoscopy Unit)  Patient participation: complete - patient participated  Level of consciousness: sleepy but conscious  Pain score: 0  Airway patency: patent  Nausea & Vomiting: no nausea and no vomiting  Cardiovascular status: hemodynamically stable  Respiratory status: acceptable  Hydration status: stable  Comments: This patient remained on the inpatient bed.  The patient was handed off to the endoscopy nursing team.  All questions regarding pre-, intra-, and postoperative care were answered.  Multimodal analgesia pain management approach    No notable events documented.

## 2024-09-04 NOTE — PLAN OF CARE
Problem: Chronic Conditions and Co-morbidities  Goal: Patient's chronic conditions and co-morbidity symptoms are monitored and maintained or improved  9/4/2024 0904 by Cassandra Coleman RN  Outcome: Progressing  9/4/2024 0025 by Yesenia Perez RN  Outcome: Progressing     Problem: Discharge Planning  Goal: Discharge to home or other facility with appropriate resources  9/4/2024 0904 by Cassandra Coleman RN  Outcome: Progressing  9/4/2024 0025 by Yesenia Perez RN  Outcome: Progressing     Problem: Pain  Goal: Verbalizes/displays adequate comfort level or baseline comfort level  9/4/2024 0904 by Cassandra Coleman RN  Outcome: Progressing  9/4/2024 0025 by Yesenia Perez RN  Outcome: Progressing     Problem: Skin/Tissue Integrity  Goal: Absence of new skin breakdown  Description: 1.  Monitor for areas of redness and/or skin breakdown  2.  Assess vascular access sites hourly  3.  Every 4-6 hours minimum:  Change oxygen saturation probe site  4.  Every 4-6 hours:  If on nasal continuous positive airway pressure, respiratory therapy assess nares and determine need for appliance change or resting period.  9/4/2024 0904 by Cassandra Coleman RN  Outcome: Progressing  9/4/2024 0025 by Yesenia Perez RN  Outcome: Progressing     Problem: Confusion  Goal: Confusion, delirium, dementia, or psychosis is improved or at baseline  Description: INTERVENTIONS:  1. Assess for possible contributors to thought disturbance, including medications, impaired vision or hearing, underlying metabolic abnormalities, dehydration, psychiatric diagnoses, and notify attending LIP  2. [place] high risk fall precautions, as indicated  3. Provide frequent short contacts to provide reality reorientation, refocusing and direction  4. Decrease environmental stimuli, including noise as appropriate  5. Monitor and intervene to maintain adequate nutrition, hydration, elimination, sleep and activity  6. If unable to ensure safety without constant

## 2024-09-04 NOTE — PLAN OF CARE
Problem: Chronic Conditions and Co-morbidities  Goal: Patient's chronic conditions and co-morbidity symptoms are monitored and maintained or improved  9/4/2024 0025 by Yesenia Perez RN  Outcome: Progressing  9/3/2024 1219 by Cassandra Coleman RN  Outcome: Progressing  Flowsheets (Taken 9/3/2024 0845)  Care Plan - Patient's Chronic Conditions and Co-Morbidity Symptoms are Monitored and Maintained or Improved: Monitor and assess patient's chronic conditions and comorbid symptoms for stability, deterioration, or improvement     Problem: Discharge Planning  Goal: Discharge to home or other facility with appropriate resources  9/4/2024 0025 by Yesenia Perez RN  Outcome: Progressing  9/3/2024 1219 by Cassandra Coleman RN  Outcome: Progressing  Flowsheets (Taken 9/3/2024 0845)  Discharge to home or other facility with appropriate resources: Identify barriers to discharge with patient and caregiver     Problem: Pain  Goal: Verbalizes/displays adequate comfort level or baseline comfort level  9/4/2024 0025 by Yesenia Perez RN  Outcome: Progressing  9/3/2024 1219 by Cassandra Coleman RN  Outcome: Progressing     Problem: Skin/Tissue Integrity  Goal: Absence of new skin breakdown  Description: 1.  Monitor for areas of redness and/or skin breakdown  2.  Assess vascular access sites hourly  3.  Every 4-6 hours minimum:  Change oxygen saturation probe site  4.  Every 4-6 hours:  If on nasal continuous positive airway pressure, respiratory therapy assess nares and determine need for appliance change or resting period.  9/4/2024 0025 by Yesenia Perez RN  Outcome: Progressing  9/3/2024 1219 by Cassandra Coleamn RN  Outcome: Progressing     Problem: Confusion  Goal: Confusion, delirium, dementia, or psychosis is improved or at baseline  Description: INTERVENTIONS:  1. Assess for possible contributors to thought disturbance, including medications, impaired vision or hearing, underlying metabolic abnormalities, dehydration,

## 2024-09-04 NOTE — PROGRESS NOTES
Pt refused to drink anymore bowel prep educated pt on the importance of this medication. Pt continued to refused

## 2024-09-05 VITALS
HEIGHT: 68 IN | HEART RATE: 69 BPM | WEIGHT: 130 LBS | OXYGEN SATURATION: 95 % | SYSTOLIC BLOOD PRESSURE: 156 MMHG | BODY MASS INDEX: 19.7 KG/M2 | RESPIRATION RATE: 16 BRPM | DIASTOLIC BLOOD PRESSURE: 82 MMHG | TEMPERATURE: 98.2 F

## 2024-09-05 LAB
BACTERIA SPEC CULT: NORMAL
GLUCOSE BLD STRIP.AUTO-MCNC: 133 MG/DL (ref 65–100)
GLUCOSE BLD STRIP.AUTO-MCNC: 175 MG/DL (ref 65–100)
GLUCOSE BLD STRIP.AUTO-MCNC: 81 MG/DL (ref 65–100)
Lab: NORMAL
PERFORMED BY:: ABNORMAL
PERFORMED BY:: ABNORMAL
PERFORMED BY:: NORMAL
PSA SERPL-MCNC: 0.2 NG/ML (ref 0.01–4)

## 2024-09-05 PROCEDURE — 82962 GLUCOSE BLOOD TEST: CPT

## 2024-09-05 PROCEDURE — 2580000003 HC RX 258: Performed by: INTERNAL MEDICINE

## 2024-09-05 PROCEDURE — 6360000002 HC RX W HCPCS: Performed by: HOSPITALIST

## 2024-09-05 PROCEDURE — G0103 PSA SCREENING: HCPCS

## 2024-09-05 PROCEDURE — 6370000000 HC RX 637 (ALT 250 FOR IP): Performed by: INTERNAL MEDICINE

## 2024-09-05 PROCEDURE — 96376 TX/PRO/DX INJ SAME DRUG ADON: CPT

## 2024-09-05 PROCEDURE — 36415 COLL VENOUS BLD VENIPUNCTURE: CPT

## 2024-09-05 PROCEDURE — G0378 HOSPITAL OBSERVATION PER HR: HCPCS

## 2024-09-05 RX ORDER — HYDRALAZINE HYDROCHLORIDE 20 MG/ML
5 INJECTION INTRAMUSCULAR; INTRAVENOUS EVERY 4 HOURS PRN
Status: DISCONTINUED | OUTPATIENT
Start: 2024-09-05 | End: 2024-09-05 | Stop reason: HOSPADM

## 2024-09-05 RX ADMIN — ATORVASTATIN CALCIUM 40 MG: 40 TABLET, FILM COATED ORAL at 08:42

## 2024-09-05 RX ADMIN — HYDRALAZINE HYDROCHLORIDE 5 MG: 20 INJECTION, SOLUTION INTRAMUSCULAR; INTRAVENOUS at 03:40

## 2024-09-05 RX ADMIN — SODIUM CHLORIDE, PRESERVATIVE FREE 10 ML: 5 INJECTION INTRAVENOUS at 08:47

## 2024-09-05 RX ADMIN — TAMSULOSIN HYDROCHLORIDE 0.4 MG: 0.4 CAPSULE ORAL at 08:42

## 2024-09-05 RX ADMIN — PANTOPRAZOLE SODIUM 40 MG: 40 TABLET, DELAYED RELEASE ORAL at 06:33

## 2024-09-05 RX ADMIN — HYDRALAZINE HYDROCHLORIDE 25 MG: 25 TABLET ORAL at 08:42

## 2024-09-05 RX ADMIN — DONEPEZIL HYDROCHLORIDE 5 MG: 5 TABLET, FILM COATED ORAL at 08:42

## 2024-09-05 RX ADMIN — SERTRALINE HYDROCHLORIDE 150 MG: 50 TABLET ORAL at 06:33

## 2024-09-05 RX ADMIN — SODIUM CHLORIDE, POTASSIUM CHLORIDE, SODIUM LACTATE AND CALCIUM CHLORIDE: 600; 310; 30; 20 INJECTION, SOLUTION INTRAVENOUS at 12:48

## 2024-09-05 RX ADMIN — METOPROLOL TARTRATE 12.5 MG: 25 TABLET, FILM COATED ORAL at 08:42

## 2024-09-05 RX ADMIN — AMLODIPINE BESYLATE 10 MG: 5 TABLET ORAL at 08:42

## 2024-09-05 RX ADMIN — HYDRALAZINE HYDROCHLORIDE 25 MG: 25 TABLET ORAL at 14:04

## 2024-09-05 RX ADMIN — BUPROPION HYDROCHLORIDE 150 MG: 150 TABLET, FILM COATED, EXTENDED RELEASE ORAL at 08:42

## 2024-09-05 RX ADMIN — LEVOTHYROXINE SODIUM 25 MCG: 0.03 TABLET ORAL at 06:33

## 2024-09-05 ASSESSMENT — PAIN SCALES - GENERAL
PAINLEVEL_OUTOF10: 0

## 2024-09-05 NOTE — PROGRESS NOTES
Patient refusing new IV placement, patient pulling arm away from RN and stating \"no\". RN attempted to educate patient on reason for PIV change, no evidence of learning noted.

## 2024-09-05 NOTE — CARE COORDINATION
DCP: return to LTC @ [facility]    Pt anticipated to be cleared for today vs 24 hours pending labs. Awaiting GI clearance.     1432: CM received notification from attending, pt is cleared for dc today. CM sent message to [facility] asking if they are able to accept back today, awaiting reply.     1459: CM notified by [facility] that they are able to accept pt today into room 127A. CM called to setup transport with [facility].             Transition of Care Plan:    RUR: 22%  Prior Level of Functioning: needs assistance  Disposition: return to LTC @ [facility]  If SNF or IPR: Date FOC offered: n/a  Date FOC received: n/a  Accepting facility: [facility]  Date authorization started with reference number: n/a  Date authorization received and expires: n/a  Follow up appointments: unit secretary to setup as needed  DME needed: none  Transportation at discharge: Hospital to Home 1730 pickup time  IM/IMM Medicare/ letter given: 1st IMM given 9/5/2024  Is patient a  and connected with [facility]? no   If yes, was [place] transfer form completed and [facility] notified? N/a  Caregiver Contact: called Adele Mcfadden  Discharge Caregiver contacted prior to discharge? yes  Care Conference needed? no  Barriers to discharge: none    Primary nurse can call report to [facility] at [phone]. Pt going to room 127A.

## 2024-09-05 NOTE — DISCHARGE SUMMARY
Physician Discharge Summary     Patient ID:    Kvng Arellano  [id number]  64 y.o.  1960    Admit date: 9/1/2024    Discharge date : 9/5/2024      Final Diagnoses:   Hypoxemia [R09.02]  Hypothermia, initial encounter [T68.XXXA]  Hematemesis [K92.0]      Reason for Hospitalization:  Kvng Arellano is a 64-year-old male resident of [facility] who has a history of bipolar disorder, dementia, hypothyroidism, hypertension and coronary disease who comes into the ED with a complaint of hypothermia and having dark stools and bloody emesis.      Upon arriving to the emergency room her fecal occult blood test was checked and was negative.  Patient was also hypoxemic.  In the ED patient was treated empirically with antibiotics, octreotide and PPIs.  His body temperature was 93.2.  Odette hugger was put on his temperature is 98.4 now.     Patient is DNR    Hospital Course:     GI was consulted  Patient got 1 unit of PRBC     On CT there is a fluctuant infiltrates, improvement in the left lung but progression in the right lung.  There is also interval development of mild superior endplate T3 compression fracture as well as pathologic sternal fracture with underlying lytic lesion.  Greater concern for malignancy clearly not typical for osteomyelitis.  Esophagitis versus distal esophageal mass.     Upper endoscopy was done the day of admission and did not show any mass.     Patient had few episodes of hypoglycemia during this admission and received appropriate treatment.    Patient underwent colonoscopy on 9/4, no evidence of malignancy.  There was small 6 mm polyps which was removed and biopsies pending.  Also internal hemorrhoids.    As his CT suggest superior endplate T3 compression fracture as well as pathological sternal fracture with underlying lytic lesions there was concern for malignancy.    His PSA level is normal  CT of the chest abdomen pelvis did not show any  evidence of malignancy.  Initially CT chest was concerning for esophageal malignancy, however on EGD it was excluded    Follow-up outpatient:  Patient needs CBC to be repeated within 1 week after discharge to make sure his hemoglobin is stable  He needs outpatient follow-up with primary care with possible referral to oncology if malignancy suspicions is high  Possible outpatient referral to orthopedics      Discharge Medications:      Medication List        CONTINUE taking these medications      acetaminophen 325 MG tablet  Commonly known as: TYLENOL     amLODIPine 10 MG tablet  Commonly known as: NORVASC  Take 1 tablet by mouth daily     Aspirin Low Dose 81 MG EC tablet  Generic drug: aspirin  TAKE ONE TABLET BY MOUTH DAILY.     atorvastatin 40 MG tablet  Commonly known as: LIPITOR  Take 1 tablet by mouth daily     buPROPion 150 MG extended release tablet  Commonly known as: WELLBUTRIN XL     calcium carbonate 500 MG Tabs tablet  Commonly known as: OSCAL     donepezil 5 MG tablet  Commonly known as: ARICEPT     ferrous sulfate 325 (65 Fe) MG tablet  Commonly known as: IRON 325     hydrALAZINE 25 MG tablet  Commonly known as: APRESOLINE     Lantus SoloStar 100 UNIT/ML injection pen  Generic drug: insulin glargine  Inject 15 Units into the skin nightly     levothyroxine 25 MCG tablet  Commonly known as: SYNTHROID     loperamide 2 MG capsule  Commonly known as: IMODIUM     melatonin 3 MG Tabs tablet     metoprolol tartrate 25 MG tablet  Commonly known as: LOPRESSOR     mirtazapine 15 MG disintegrating tablet  Commonly known as: REMERON SOL-TAB  Take 0.5 tablets by mouth nightly     ondansetron 4 MG tablet  Commonly known as: ZOFRAN     Sertraline HCl 150 MG Caps     sodium chloride 0.9 % solution  Commonly known as: Sodium Chloride     tamsulosin 0.4 MG capsule  Commonly known as: FLOMAX     traZODone 50 MG tablet  Commonly known as: DESYREL     vitamin D 1.25 MG (14359 UT) Caps capsule  Commonly known as:

## 2024-09-05 NOTE — DISCHARGE INSTRUCTIONS
You were admitted to the hospital because of the bleeding.  We do colonoscopy but did not find any source of bleeding, we did find small polyp which was removed.  You will need to follow-up with your primary care doctor after discharge.  Will stop the medication called clopidogrel because it increases the risk of bleeding while taking with aspirin.  Suggest continue to take aspirin and subsequently developed.

## 2024-09-05 NOTE — PROGRESS NOTES
Pt to return to [facility].  PIV removed and external urinary catheter removed.  Brief placed on pt. Discharge paperwork printed.  Discussed with pt and paperwork given to transport.  [facility] came at 1730 and transported pt via stretcher. Report attempted to be called x2. On hold over 5 min each time and no answer. Pt is a return pt there. If they call back will give report.

## 2024-09-05 NOTE — PLAN OF CARE
Problem: Chronic Conditions and Co-morbidities  Goal: Patient's chronic conditions and co-morbidity symptoms are monitored and maintained or improved  Outcome: Progressing  Flowsheets (Taken 9/4/2024 2000)  Care Plan - Patient's Chronic Conditions and Co-Morbidity Symptoms are Monitored and Maintained or Improved: Monitor and assess patient's chronic conditions and comorbid symptoms for stability, deterioration, or improvement     Problem: Discharge Planning  Goal: Discharge to home or other facility with appropriate resources  Outcome: Progressing  Flowsheets (Taken 9/4/2024 2000)  Discharge to home or other facility with appropriate resources: Identify barriers to discharge with patient and caregiver     Problem: Pain  Goal: Verbalizes/displays adequate comfort level or baseline comfort level  Outcome: Progressing     Problem: Skin/Tissue Integrity  Goal: Absence of new skin breakdown  Description: 1.  Monitor for areas of redness and/or skin breakdown  2.  Assess vascular access sites hourly  3.  Every 4-6 hours minimum:  Change oxygen saturation probe site  4.  Every 4-6 hours:  If on nasal continuous positive airway pressure, respiratory therapy assess nares and determine need for appliance change or resting period.  Outcome: Progressing     Problem: Confusion  Goal: Confusion, delirium, dementia, or psychosis is improved or at baseline  Description: INTERVENTIONS:  1. Assess for possible contributors to thought disturbance, including medications, impaired vision or hearing, underlying metabolic abnormalities, dehydration, psychiatric diagnoses, and notify attending LIP  2. [place] high risk fall precautions, as indicated  3. Provide frequent short contacts to provide reality reorientation, refocusing and direction  4. Decrease environmental stimuli, including noise as appropriate  5. Monitor and intervene to maintain adequate nutrition, hydration, elimination, sleep and activity  6. If unable to ensure

## 2024-09-05 NOTE — PROGRESS NOTES
Spiritual Health Assessment/Progress Note  [facility]    Initial Encounter,  , Life Adjustments, Adjustment to illness,      Name: Kvng Arellano MRN: [id number]    Age: 64 y.o.     Sex: male   Language: English   Denominational: Unitarian Universalist   Hypothermia     Date: 9/5/2024            Total Time Calculated: 25 min              Spiritual Assessment began in [facility] [place]        Referral/Consult From: Rounding   Encounter Overview/Reason: Initial Encounter  Service Provided For: Patient    Kalee, Belief, Meaning:   Patient is connected with a kalee tradition or spiritual practice and has beliefs or practices that help with coping during difficult times  Family/Friends No family/friends present      Importance and Influence:  Patient has spiritual/personal beliefs that influence decisions regarding their health  Family/Friends no family/friends present    Community:  Patient is connected with a spiritual community and feels well-supported. Support system includes: Kalee Community, Extended family, and Other:    Family/Friends Other:      Assessment and Plan of Care:   Patient seen by [facility] [place]  Intern. Patient is resting in bed and is alone in the room. Patient shared life/review/legacy including medical, work, family and being raised in the Buddhism denomination. Shared his work history has been vast and considers himself to be knowledgeable in many fields. Shared he is able to cope by having kalee. Listened empathically providing time and space for reflection and sharing of life's sacred events. Provided words of encouragement and comfort. Maintained sustaining ministry of presence. Patient smiled intermittently and thanked  for visit.   Patient Interventions include: Facilitated expression of thoughts and feelings, Explored spiritual coping/struggle/distress and theological reflection, Affirmed coping skills/support systems, and Engaged in life review and/or

## 2024-09-07 LAB
BACTERIA SPEC CULT: NORMAL
BACTERIA SPEC CULT: NORMAL
Lab: NORMAL
Lab: NORMAL

## 2024-10-02 PROBLEM — E86.0 DEHYDRATION: Status: RESOLVED | Noted: 2024-01-01 | Resolved: 2024-01-01

## 2024-10-21 PROBLEM — I95.9 HYPOTENSION (ARTERIAL): Status: ACTIVE | Noted: 2024-01-01

## 2024-10-21 NOTE — ED TRIAGE NOTES
Pt arrived via ems from [facility] rehab for a glf was told he is on blood thinner, had an episode of vomiting, is altered from baseline.     On arrival pt in C collar, alert , and answering questions when asked by  Pt experiencing nausea at this time.     Unable to see any blood thinner in his med rec.

## 2024-10-21 NOTE — ED PROVIDER NOTES
[facility] EMERGENCY DEPT  EMERGENCY DEPARTMENT HISTORY AND PHYSICAL EXAM      Date: 10/21/2024  Patient Name: Kvng Arellano  MRN: [id number]  Birthdate 1960  Date of evaluation: 10/21/2024  Provider: Wen Elizabeth MD   Note Started: 4:33 PM EDT 10/21/24    HISTORY OF PRESENT ILLNESS     Chief Complaint   Patient presents with    Fall       History Provided By: Patient    HPI: Kvng Arellano is a 64 y.o. male with PMH medication as below who comes to the ED from SNF with a mechanical fall.  Per report, patient is DNR but they are able to print the DNR order from the nursing facility.  Patient had ground-level fall resulting in head trauma and laceration.  Patient appears more altered than baseline.  No reported other traumas.  In the ED patient is denying any pain or any other complaints.  Reviewed the patient's hospitalization and discharge from 9/5/2024 provide the patient is DNR.    PAST MEDICAL HISTORY   Past Medical History:  Past Medical History:   Diagnosis Date    Anemia     Benign prostatic hyperplasia with lower urinary tract symptoms     Bipolar 1 disorder (HCC)     CAD (coronary artery disease)     Chronic kidney disease (CKD), stage III (moderate) (HCC)     CVS disease     Dementia (HCC)     Diabetes (HCC)     Dysphagia     Enterocolitis due to Clostridium difficile     High cholesterol     HTN (hypertension)     Hypothyroidism 09/02/2024    Myocardial infarction acute (HCC)     Pneumonia     Respiratory failure, unspecified with hypoxia (HCC)     TIA (transient ischemic attack)     Type 2 diabetes mellitus (HCC) 09/02/2024       Past Surgical History:  Past Surgical History:   Procedure Laterality Date    COLONOSCOPY N/A 9/4/2024    COLONOSCOPY DIAGNOSTIC performed by Jay Ibarra MD at [facility] ENDOSCOPY    COLONOSCOPY N/A 9/4/2024    COLONOSCOPY POLYPECTOMY SNARE/BIOPSY performed by Jay Ibarra MD at [facility] ENDOSCOPY    HERNIA REPAIR      UPPER GASTROINTESTINAL ENDOSCOPY N/A 9/2/2024

## 2024-10-22 NOTE — H&P
General surgery history and Physical    Patient: Kvng Arellano  MRN: [id number]    YOB: 1960  Age: 64 y.o.  Sex: male     Chief Complaint:  Chief Complaint   Patient presents with    Fall       History of Present Illness: Kvng Arellano is a 64 y.o. very pleasant gentleman examined in ER.  I was requested admit  this patient due to fall.    Patient examined.  He is awake and alert.  He is aware of that accident.  He apparently tried to get out of bed and fell.  He sustained some bleeding from scalp area.    Primary survey shows a small hematoma noted over the right temporal area without active signs of bleeding or open wound.    GCS 15.  Following commands.  Hemodynamically stable.    Lung sounds clear.  Patient apparently has been mildly hypoxic and hypothermic.        Social History:  Social Connections: Patient Unable To Answer (10/22/2024)    Social Connections ([facility] HRSN)     If for any reason you need help with day-to-day activities such as bathing, preparing meals, shopping, managing finances, etc., do you get the help you need?: Not on file       Past Medical History:  Past Medical History:   Diagnosis Date    Anemia     Benign prostatic hyperplasia with lower urinary tract symptoms     Bipolar 1 disorder (HCC)     CAD (coronary artery disease)     Chronic kidney disease (CKD), stage III (moderate) (HCC)     CVS disease     Dementia (HCC)     Diabetes (HCC)     Dysphagia     Enterocolitis due to Clostridium difficile     High cholesterol     HTN (hypertension)     Hypothyroidism 09/02/2024    Myocardial infarction acute (HCC)     Pneumonia     Respiratory failure, unspecified with hypoxia (HCC)     TIA (transient ischemic attack)     Type 2 diabetes mellitus (HCC) 09/02/2024     Surgical History:  Past Surgical History:   Procedure Laterality Date    COLONOSCOPY N/A 9/4/2024    COLONOSCOPY DIAGNOSTIC performed by Jay Ibarra MD at [facility] ENDOSCOPY    COLONOSCOPY N/A 9/4/2024     19.77 kg/m²   Gen: Well developed, well nourished 64 y.o. male in no acute distress  Head: normocephalic, atraumatic  EYES: Pupils are equal and reactive.  Chest wall: Excursion normal with respiration cycle, no obvious audible wheeze.  Mouth: Clear, no overt lesions, oral mucosa pink and moist  Neck: supple, no masses, no adenopathy or carotid bruits, trachea midline  Resp: clear to auscultation bilaterally, no wheeze, rhonchi or rales, excursions normal and symmetrical  Cardio: Regular rate and rhythm, no murmurs, clicks, gallops or rubs, no edema or varicosities  Abdomen: Soft nontender  Neuro: sensation and strength grossly intact and symmetrical  Psych: alert and oriented to person, place and time  Skin: warm and moist.  Hematologic system: No bruising.  Vascular examination: Intact in lower extremity.  Labs:  Recent Results (from the past 24 hour(s))   CBC with Auto Differential    Collection Time: 10/21/24  4:40 PM   Result Value Ref Range    WBC 8.1 4.1 - 11.1 K/uL    RBC 2.94 (L) 4.10 - 5.70 M/uL    Hemoglobin 9.1 (L) 12.1 - 17.0 g/dL    Hematocrit 27.9 (L) 36.6 - 50.3 %    MCV 94.9 80.0 - 99.0 FL    MCH 31.0 26.0 - 34.0 PG    MCHC 32.6 30.0 - 36.5 g/dL    RDW 16.1 (H) 11.5 - 14.5 %    Platelets 265 150 - 400 K/uL    MPV 9.8 8.9 - 12.9 FL    Nucleated RBCs 0.0 0.0  WBC    nRBC 0.00 0.00 - 0.01 K/uL    Neutrophils % 83 (H) 32 - 75 %    Lymphocytes % 8 (L) 12 - 49 %    Monocytes % 4 (L) 5 - 13 %    Eosinophils % 5 0 - 7 %    Basophils % 0 0 - 1 %    Immature Granulocytes % 0 0 - 0.5 %    Neutrophils Absolute 6.7 1.8 - 8.0 K/UL    Lymphocytes Absolute 0.7 (L) 0.8 - 3.5 K/UL    Monocytes Absolute 0.3 0.0 - 1.0 K/UL    Eosinophils Absolute 0.4 0.0 - 0.4 K/UL    Basophils Absolute 0.0 0.0 - 0.1 K/UL    Immature Granulocytes Absolute 0.0 0.00 - 0.04 K/UL    Differential Type AUTOMATED     Comprehensive Metabolic Panel    Collection Time: 10/21/24  4:40 PM   Result Value Ref Range    Sodium 145 136 - 145

## 2024-10-22 NOTE — PROGRESS NOTES
Code called 1459 compressions started  1507 pulse check  1508 CPR resumed  1508 Epi  1508 bagged  1509 pulse check  1509 bicarb  1510 epi  1511 intubated  1511 pulse check  1512 CPR resumed  1513 pulse check  1514 CPR resumed  1515 bicarb  1515 epi  1516 Calcium chloride  1518 pulse check, CPR resumed  1520 epi  1520 IV left hand placed  1521 pulse check  1521 CPR resumed  1526 pulse check  1527 pulse found  1529 BP taken, no reading transferred to PACU

## 2024-10-22 NOTE — PROGRESS NOTES
Spoke with Investigator Gonzalez at the medical examiners office.  Stated patient is not an ME case and to pull all lines and take him to the morgue.

## 2024-10-22 NOTE — CODE DOCUMENTATION
[facility] CODE BLUE    Responded to CODE BLUE called overhead.  See also flowsheets and nursing code documentation for timing and details.    Upon my arrival, CPR already in progress.    Patient covered in gastric contents with obvious emesis and aspiration.    Initial rhythm once pads were connected was asystole.    Patient intubated by anesthesia during code with copious gastric contents suctioned from the ET tube.    Patient received multiple rounds of epinephrine, 3 A total bicarb, 1 amp calcium.    Patient then into PEA.  After 27 minutes resuscitative efforts, patient did have pulse, but unable to obtain blood pressure.  This was likely related to medication effect, not sustainable.  Patient again bradycardia down.  We were then able to contact the POA, Adele Mcfadden, [phone] who confirmed that the patient typically is DNR status and would not wish to have further resuscitative efforts.     The patient did bradycardia down to PEA.  Given confirmation of CODE STATUS, no resuscitative efforts were continued.    LTOD = 10/22/2024 at 1541 hrs.    Prelim COD = respiratory arrest due to copious aspiration of gastric contents    Surgical attending made aware.    Amarjit Cartwright,   Pulmonary, Critical Care Medicine  10/22/2024 4:23 PM

## 2024-10-22 NOTE — PROGRESS NOTES
Spiritual Health History and Assessment/Progress Note  [facility]    Attempted Encounter, Crisis, Code Blue,  ,      Name: Kvng Arellano MRN: [id number]    Age: 64 y.o.     Sex: male   Language: English   Yazidi: Unitarian Universalist   Hypotension (arterial)     Date: 10/22/2024            Total Time Calculated: 36 min              Spiritual Assessment continued in SSR 2 [place]            Encounter Overview/Reason: Attempted Encounter, Crisis  Service Provided For: Patient not available    Kalee, Belief, Meaning:   Patient unable to assess at this time  Family/Friends No family/friends present      Importance and Influence:  Patient unable to assess at this time  Family/Friends No family/friends present    Community:  Patient Other: unable to assess  Family/Friends No family/friends present    Assessment and Plan of Care:     Patient Interventions include: Other: ministry of presence  Family/Friends Interventions include: No family/friends present    Patient Plan of Care: Spiritual Care available upon further referral  Family/Friends Plan of Care: No family/friends present    Responded to Pt's Code Blue. Pt survived and was transferred to another unit. Unable to fulfill spiritual consult request for Pt due to his being incapacitated. Further spiritual support available upon referral. At time of response, CORRY Goncalves, reports no family.     Electronically signed by Rufus Weinstein MDiv, Chaplain Resident on 10/22/2024 at 3:37 PM

## 2024-10-22 NOTE — PROGRESS NOTES
Received Order for Telemetry     Kvng STEINBERG Eileen   1960   [id number]   Hypotension (arterial) [I95.9]   Xiang Thompson MD     Tele Box # [id number] placed on patient at  0226 am  ER Room # T1  Admitting to Room 223  Verified with Primary Nurse *** at {Time:[id number]}

## 2024-10-22 NOTE — PROGRESS NOTES
4 Eyes Skin Assessment     NAME:  Kvng Arellano  YOB: 1960  MEDICAL RECORD NUMBER:  [id number]    The patient is being assessed for  Admission    I agree that at least one RN has performed a thorough Head to Toe Skin Assessment on the patient. ALL assessment sites listed below have been assessed.      Areas assessed by both nurses:    Head, Face, Ears, Shoulders, Back, Chest, Arms, Elbows, Hands, Sacrum. Buttock, Coccyx, Ischium, Legs. Feet and Heels, and Under Medical Devices         Does the Patient have a Wound? Yes wound(s) were present on assessment. LDA wound assessment was Initiated and completed by RN       Amarjit Prevention initiated by RN: Yes  Wound Care Orders initiated by RN: Yes    Pressure Injury (Stage 3,4, Unstageable, DTI, NWPT, and Complex wounds) if present, place Wound referral order by RN under : No    New Ostomies, if present place, Ostomy referral order under : No     Nurse 1 eSignature: Electronically signed by Marilia Aguilera RN on 10/22/24 at 3:22 AM EDT    **SHARE this note so that the co-signing nurse can place an eSignature**    Nurse 2 eSignature: Electronically signed by Roel Farias RN on 10/22/24 at 8:01 AM EDT

## 2024-10-22 NOTE — PROGRESS NOTES
Spiritual Health History and Assessment/Progress Note  [facility]    Attempted Encounter,  ,  ,      Name: Kvng Arellano MRN: [id number]    Age: 64 y.o.     Sex: male   Language: English   Amish: Unitarian Universalist   Hypotension (arterial)     Date: 10/22/2024            Total Time Calculated: 18 min              Spiritual Assessment began in SSR 2 [place]            Encounter Overview/Reason: Attempted Encounter  Service Provided For: Patient not available    Kalee, Belief, Meaning:   Patient unable to assess at this time  Family/Friends No family/friends present      Importance and Influence:  Patient unable to assess at this time  Family/Friends No family/friends present    Community:  Patient Other: ALANA at this time  Family/Friends No family/friends present    Assessment and Plan of Care:     Patient Interventions include: Other: Left spiritual services card  Family/Friends Interventions include: No family/friends present    Patient Plan of Care: Spiritual Care available upon further referral  Family/Friends Plan of Care: No family/friends present    I attempted to respond to spiritual services consult for Pt, Kvng Arellano. Pt was asleep upon my arrival. I fetched a spiritual services contact card, wrote a short note introducing myself, and left it on Pt's tray table for him to review when he is awake. I will attempt to follow up later today in order to complete consult request.    Electronically signed by Rufus Weinstein MDiv, Chaplain Resident on 10/22/2024 at 10:19 AM

## 2024-10-22 NOTE — CARE COORDINATION
10/22/24 1045   Service Assessment   Patient Orientation Unable to Assess   Cognition Alert   History Provided By Medical Record   Primary Caregiver Other (Comment)  ([facility]: [facility])   Support Systems Other (Comment)  ([facility] Staff)   Patient's Healthcare Decision Maker is: Legal Next of Kin   PCP Verified by CM Yes  ([facility] Physician)   Last Visit to PCP Within last 3 months   Prior Functional Level Assistance with the following:;Bathing;Dressing;Toileting;Feeding;Cooking;Housework;Shopping;Mobility   Current Functional Level Assistance with the following:;Bathing;Dressing;Toileting;Feeding;Cooking;Housework;Shopping;Mobility   Can patient return to prior living arrangement Yes   Ability to make needs known: Poor   Family able to assist with home care needs: No   Would you like for me to discuss the discharge plan with any other family members/significant others, and if so, who? Yes  (Adele cMfadden, Lay Caregiver)   Financial Resources Medicaid;Medicare   Community Resources None   Discharge Planning   Patient expects to be discharged to: Long-term care     1045: CM met with patient at bedside: unable to fully answer questions.    CM confirmed with [facility], patient is a long term care resident and has Medicaid.    CM telephoned, Adele Mcfadden, Lay Caregiver listed on chart. She reminded CM she provided R&B for patient about a year ago. She shared [facility] notified her of the patient's admission to hospital 10/21/2024.    Current Dispo: [facility]    1420: Per [facility], Medicaid #[id number]     Advance Care Planning     General Advance Care Planning (ACP) Conversation    Date of Conversation: 10/22/2024  Conducted with: Legal next of kin  Other persons present: None    Healthcare Decision Maker: No healthcare decision makers have been documented.       Content/Action Overview:  Has NO ACP

## 2024-10-22 NOTE — DEATH NOTES
Death Pronouncement Note  Patient's Name: Kvng Arellano   Patient's YOB: 1960  MRN Number: [id number]    Admitting Provider: Xiang Thompson MD  Attending Provider: Xiang Thompson MD    Patient was examined and the following were absent: Pulses, Blood Pressure, and Respiratory effort    I declared the patient dead on 10/22/2024 at 3:41 PM    Preliminary Cause of Death: Respiratory arrest due to aspiration of gastric contents.    ME made aware, declined case.     Electronically signed by Amarjit Cartwright DO on 10/22/24 at 5:16 PM EDT

## 2024-10-22 NOTE — PROGRESS NOTES
RRT Clinical Rounding Nurse Progress Report      SUBJECTIVE: Patient assessed secondary to elevated Deterioration Index Score.      Vitals:    10/22/24 0100 10/22/24 0115 10/22/24 0130 10/22/24 0145   BP: (!) 157/81 (!) 161/78 (!) 157/79 (!) 146/73   Pulse: 90 95 94 89   Resp: 23 18 16 23   Temp:       TempSrc:       SpO2: 100% 98% 99% 99%   Weight:       Height:            DETERIORATION INDEX SCORE: 54    ASSESSMENT:  Spoke with 2 [facility] primary RN regarding elevated deterioration index. She will assess and follow up as needed    PLAN:  Will follow per RRT Clinical Rounding Program protocol.    Rocky Graham RN  [facility]: [phone]  [facility]: [phone]

## 2024-10-22 NOTE — ED NOTES
BARE HUGGER APPLIED   
Back from ct, back on bp pulse ox and cardiac monitoring   
C SPINE CLEARED BY DR BALDERAS, C COLLAR REMOVED   
PT TO CT    
Patient reassessed. Rectal temperature 99.1, warming blanket removed. Patient 02 sats 99% room air, so oxygen via nasal cannula removed as well. Patient now resting quietly. Will continue to monitor.   
RN assumed care of patient at this time in conjunction with CORRY Head. Bedside/verbal report received from CARLOS ALBERTO Singer RN at this time. Patient awake and confused in semi-fowlers position. Patient on continuous cardiac monitoring, BP , and SpO2. Patient remains on fina hugger at this time. Stretcher in lowest locked position and call bell within reach.     
Received report from Cathie WHEATLEY at start of shift. Patient is resting quietly in room with warming blanket. Denied any pain or discomfort. Will continue to monitor.   
Report to Colette arredondo  
Warm blankets applied   
Reactions    Ace Inhibitors Swelling    Bee Venom      Other reaction(s): other/intolerance    Iodine Other (See Comments)    Sulfa Antibiotics Itching     History:   Past Medical History:   Diagnosis Date    Anemia     Benign prostatic hyperplasia with lower urinary tract symptoms     Bipolar 1 disorder (HCC)     CAD (coronary artery disease)     Chronic kidney disease (CKD), stage III (moderate) (HCC)     CVS disease     Dementia (HCC)     Diabetes (HCC)     Dysphagia     Enterocolitis due to Clostridium difficile     High cholesterol     HTN (hypertension)     Hypothyroidism 09/02/2024    Myocardial infarction acute (HCC)     Pneumonia     Respiratory failure, unspecified with hypoxia (HCC)     TIA (transient ischemic attack)     Type 2 diabetes mellitus (HCC) 09/02/2024       Assessment  Vitals:    Level of Consciousness: Alert (0)   Vitals:    10/22/24 0020 10/22/24 0030 10/22/24 0035 10/22/24 0050   BP:  (!) 158/76  (!) 159/78   Pulse: 90 93 92 85   Resp: 18 27 23 27   Temp:    98.4 °F (36.9 °C)   TempSrc:    Rectal   SpO2: 98% 98% 98% 98%   Weight:       Height:         Deterioration Index (DI): Deterioration Index: (!) 49.3  Deterioration Index (DI) Interventions Performed: Deterioration Index RN Interventions Performed : Provider Notified of Clinical Concerns  O2 Flow Rate: O2 Flow Rate (L/min): 4 L/min  O2 Device: O2 Device: Nasal cannula  Cardiac Rhythm:    Critical Lab Results: [unfilled]  Cultures: Cultures:None  NIH Score: NIH     Active LDA's:   Peripheral IV 10/21/24 Left Antecubital (Active)       Peripheral IV 10/21/24 Left;Posterior Hand (Active)     Active Central Lines:                          Active Wounds:    Active Rajput's:    Active Feeding Tubes:      Administered Medications:   Medications   sodium chloride flush 0.9 % injection 5-40 mL (10 mLs IntraVENous Given 10/21/24 3790)   sodium chloride flush 0.9 % injection 5-40 mL (has no administration in time range)   0.9 % sodium chloride

## 2024-11-02 NOTE — PROGRESS NOTES
Physician Progress Note      PATIENT:               NEY GARCIA  [facility] #:                  [id number]  :                       1960  ADMIT DATE:       10/21/2024 4:29 PM  DISCH DATE:        10/22/2024 3:41 PM  RESPONDING  PROVIDER #:        Amarjit Cartwright DO          QUERY TEXT:    64yoM pt presented due to fall admitted for Head injury with small hematoma.   Pt noted to have Hypothermia 93.9F and borderline hypotension (BP 83/46 MAP 58    88/50 MAP 64.  noted Respiratory rate 22 - 28 88% on Room air in ED, briefly   placed on O2 4L 95% O2 sat.   If possible, please document in the progress   notes and discharge summary if you are evaluating and /or treating any of the   following:  The medical record reflects the following:  Risk Factors: vomiting, nausea prior to arrival per EMS, noted healing rib   fractures bilaterally on CXR 10/22  Clinical Indicators: Borderline hypotension BP Systolic 80s - 90, diastolic BP   40s, RR 22 - 28, temp 93.9F fina hugger placed, 500 NSS bolus given in ED.  Labs WBC 8.1 Neut 83 Ymph 8 -> 14.2 Neut 96 Lymph 2; CXR findings of    Reticulonodular opacities in the right mid and upper lung zone concerning for   small airways infection, bronchitis/bronchiolitis.  Partially imaged is   air-filled structure  LUQ of abdomen likely distended stomach.  10/22 Code Blue note states upon arrival, CPR in progress.  Patient covered in   gastric contents with obvious emesis and aspiration.'  Treatment:  Fina hugger, 500 bolus in ED, supplemental O2, CXR, DNR status.  Options provided:  -- Possible Sepsis, present on admission  -- Sepsis was ruled out  -- Other - I will add my own diagnosis  -- Disagree - Not applicable / Not valid  -- Disagree - Clinically unable to determine / Unknown  -- Refer to Clinical Documentation Reviewer    PROVIDER RESPONSE TEXT:    After further study, sepsis was ruled out for this patient.    Query created by: Karma Fregoso on 10/30/2024 1:12  by: Karma Fregoso on 10/30/2024 1:12 PM      Electronically signed by:  Amarjit Cartwright DO 11/2/2024 12:34 PM

## 2024-11-04 NOTE — DISCHARGE SUMMARY
This is a discharge summary for Kvng Arellano, patient's YOB: 1960.    Patient was admitted to the trauma service after he fell at home and sustained a small scalp hematoma.  Patient was admitted on October 21, 2024.  Trauma workup shows no other injuries.  Patient however has significant other medical problems.  Hospitalist was consulted.  I was out of town, chart reviewed.  Apparently patient had a CODE BLUE initiated on October 22 1623 hrs.  CPR was initiated.  Patient was covered with a gastric content with obvious emesis and aspiration.  Unfortunately patient could not be revived and patient was pronounced on October 22, 2024 at 1541-hour.

## 2025-04-01 ENCOUNTER — TELEPHONE (OUTPATIENT)
Age: 65
End: 2025-04-01

## 2025-04-01 NOTE — TELEPHONE ENCOUNTER
Nikhil from [facility].He needs a death certificate signed by Dr. Thompson. The patient passed in October 2024. He has been an unclaimed body. Please call him with any questions [phone]. I gave this message to Ms. Jerome the . adarsh

## 2025-04-01 NOTE — TELEPHONE ENCOUNTER
I spoke with Dr. Thompson and informed him that the death certificate on Mr Kvng Arellano needed to be completed. He stated that he would log on to the [facility]'s Office of Vital Records to complete it.

## (undated) DEVICE — LINE SAMPLING ADVANCE ORAL NASAL MICROSTREAM O2 TUBING 6.5'

## (undated) DEVICE — MASK ANES INF SZ 2 PREM TAIL VLV INFL PRT UNSCENTED SGL PT

## (undated) DEVICE — MASK O2 MED AD 7 FT 3 IN 1 W/ STD CONN LTX

## (undated) DEVICE — KIT ENDOSCOPIC  PROC VIA